# Patient Record
Sex: MALE | Race: OTHER | NOT HISPANIC OR LATINO | URBAN - METROPOLITAN AREA
[De-identification: names, ages, dates, MRNs, and addresses within clinical notes are randomized per-mention and may not be internally consistent; named-entity substitution may affect disease eponyms.]

---

## 2018-06-16 ENCOUNTER — INPATIENT (INPATIENT)
Facility: HOSPITAL | Age: 68
LOS: 3 days | Discharge: HOME | DRG: 189 | End: 2018-06-20
Attending: INTERNAL MEDICINE | Admitting: INTERNAL MEDICINE
Payer: MEDICAID

## 2018-06-16 VITALS
HEIGHT: 69 IN | OXYGEN SATURATION: 93 % | SYSTOLIC BLOOD PRESSURE: 161 MMHG | TEMPERATURE: 99 F | WEIGHT: 160.72 LBS | RESPIRATION RATE: 18 BRPM | DIASTOLIC BLOOD PRESSURE: 76 MMHG | HEART RATE: 107 BPM

## 2018-06-16 LAB
ALBUMIN SERPL ELPH-MCNC: 4.2 G/DL — SIGNIFICANT CHANGE UP (ref 3.3–5)
ALBUMIN SERPL ELPH-MCNC: 4.9 G/DL — SIGNIFICANT CHANGE UP (ref 3.3–5)
ALP SERPL-CCNC: 53 U/L — SIGNIFICANT CHANGE UP (ref 40–120)
ALP SERPL-CCNC: 63 U/L — SIGNIFICANT CHANGE UP (ref 40–120)
ALT FLD-CCNC: 13 U/L — SIGNIFICANT CHANGE UP (ref 10–45)
ALT FLD-CCNC: 15 U/L — SIGNIFICANT CHANGE UP (ref 10–45)
ANION GAP SERPL CALC-SCNC: 16 MMOL/L — SIGNIFICANT CHANGE UP (ref 5–17)
ANION GAP SERPL CALC-SCNC: 20 MMOL/L — HIGH (ref 5–17)
AST SERPL-CCNC: 16 U/L — SIGNIFICANT CHANGE UP (ref 10–40)
AST SERPL-CCNC: 16 U/L — SIGNIFICANT CHANGE UP (ref 10–40)
BASE EXCESS BLDV CALC-SCNC: -1.5 MMOL/L — SIGNIFICANT CHANGE UP (ref -2–2)
BASE EXCESS BLDV CALC-SCNC: -2.4 MMOL/L — LOW (ref -2–2)
BASOPHILS # BLD AUTO: 0 K/UL — SIGNIFICANT CHANGE UP (ref 0–0.2)
BASOPHILS NFR BLD AUTO: 0.5 % — SIGNIFICANT CHANGE UP (ref 0–2)
BILIRUB SERPL-MCNC: 0.3 MG/DL — SIGNIFICANT CHANGE UP (ref 0.2–1.2)
BILIRUB SERPL-MCNC: 0.6 MG/DL — SIGNIFICANT CHANGE UP (ref 0.2–1.2)
BUN SERPL-MCNC: 10 MG/DL — SIGNIFICANT CHANGE UP (ref 7–23)
BUN SERPL-MCNC: 16 MG/DL — SIGNIFICANT CHANGE UP (ref 7–23)
CA-I SERPL-SCNC: 1.18 MMOL/L — SIGNIFICANT CHANGE UP (ref 1.12–1.3)
CA-I SERPL-SCNC: 1.19 MMOL/L — SIGNIFICANT CHANGE UP (ref 1.12–1.3)
CALCIUM SERPL-MCNC: 9.1 MG/DL — SIGNIFICANT CHANGE UP (ref 8.4–10.5)
CALCIUM SERPL-MCNC: 9.5 MG/DL — SIGNIFICANT CHANGE UP (ref 8.4–10.5)
CHLORIDE BLDV-SCNC: 105 MMOL/L — SIGNIFICANT CHANGE UP (ref 96–108)
CHLORIDE BLDV-SCNC: 105 MMOL/L — SIGNIFICANT CHANGE UP (ref 96–108)
CHLORIDE SERPL-SCNC: 99 MMOL/L — SIGNIFICANT CHANGE UP (ref 96–108)
CHLORIDE SERPL-SCNC: 99 MMOL/L — SIGNIFICANT CHANGE UP (ref 96–108)
CO2 BLDV-SCNC: 23 MMOL/L — SIGNIFICANT CHANGE UP (ref 22–30)
CO2 BLDV-SCNC: 23 MMOL/L — SIGNIFICANT CHANGE UP (ref 22–30)
CO2 SERPL-SCNC: 21 MMOL/L — LOW (ref 22–31)
CO2 SERPL-SCNC: 25 MMOL/L — SIGNIFICANT CHANGE UP (ref 22–31)
CREAT SERPL-MCNC: 1.12 MG/DL — SIGNIFICANT CHANGE UP (ref 0.5–1.3)
CREAT SERPL-MCNC: 1.15 MG/DL — SIGNIFICANT CHANGE UP (ref 0.5–1.3)
EOSINOPHIL # BLD AUTO: 0.3 K/UL — SIGNIFICANT CHANGE UP (ref 0–0.5)
EOSINOPHIL NFR BLD AUTO: 3.4 % — SIGNIFICANT CHANGE UP (ref 0–6)
GAS PNL BLDV: 136 MMOL/L — SIGNIFICANT CHANGE UP (ref 136–145)
GAS PNL BLDV: 139 MMOL/L — SIGNIFICANT CHANGE UP (ref 136–145)
GAS PNL BLDV: SIGNIFICANT CHANGE UP
GLUCOSE BLDV-MCNC: 248 MG/DL — HIGH (ref 70–99)
GLUCOSE BLDV-MCNC: 315 MG/DL — HIGH (ref 70–99)
GLUCOSE SERPL-MCNC: 161 MG/DL — HIGH (ref 70–99)
GLUCOSE SERPL-MCNC: 249 MG/DL — HIGH (ref 70–99)
HCO3 BLDV-SCNC: 22 MMOL/L — SIGNIFICANT CHANGE UP (ref 21–29)
HCO3 BLDV-SCNC: 22 MMOL/L — SIGNIFICANT CHANGE UP (ref 21–29)
HCT VFR BLD CALC: 41.5 % — SIGNIFICANT CHANGE UP (ref 39–50)
HCT VFR BLDA CALC: 36 % — LOW (ref 39–50)
HCT VFR BLDA CALC: 38 % — LOW (ref 39–50)
HGB BLD CALC-MCNC: 11.7 G/DL — LOW (ref 13–17)
HGB BLD CALC-MCNC: 12.5 G/DL — LOW (ref 13–17)
HGB BLD-MCNC: 14.1 G/DL — SIGNIFICANT CHANGE UP (ref 13–17)
LACTATE BLDV-MCNC: 5.5 MMOL/L — CRITICAL HIGH (ref 0.7–2)
LACTATE BLDV-MCNC: 6 MMOL/L — CRITICAL HIGH (ref 0.7–2)
LYMPHOCYTES # BLD AUTO: 1.5 K/UL — SIGNIFICANT CHANGE UP (ref 1–3.3)
LYMPHOCYTES # BLD AUTO: 17.2 % — SIGNIFICANT CHANGE UP (ref 13–44)
MCHC RBC-ENTMCNC: 28.4 PG — SIGNIFICANT CHANGE UP (ref 27–34)
MCHC RBC-ENTMCNC: 33.9 GM/DL — SIGNIFICANT CHANGE UP (ref 32–36)
MCV RBC AUTO: 83.7 FL — SIGNIFICANT CHANGE UP (ref 80–100)
MONOCYTES # BLD AUTO: 0.7 K/UL — SIGNIFICANT CHANGE UP (ref 0–0.9)
MONOCYTES NFR BLD AUTO: 8.6 % — SIGNIFICANT CHANGE UP (ref 2–14)
NEUTROPHILS # BLD AUTO: 6 K/UL — SIGNIFICANT CHANGE UP (ref 1.8–7.4)
NEUTROPHILS NFR BLD AUTO: 70.3 % — SIGNIFICANT CHANGE UP (ref 43–77)
NT-PROBNP SERPL-SCNC: 78 PG/ML — SIGNIFICANT CHANGE UP (ref 0–300)
PCO2 BLDV: 36 MMHG — SIGNIFICANT CHANGE UP (ref 35–50)
PCO2 BLDV: 37 MMHG — SIGNIFICANT CHANGE UP (ref 35–50)
PH BLDV: 7.38 — SIGNIFICANT CHANGE UP (ref 7.35–7.45)
PH BLDV: 7.41 — SIGNIFICANT CHANGE UP (ref 7.35–7.45)
PLATELET # BLD AUTO: 201 K/UL — SIGNIFICANT CHANGE UP (ref 150–400)
PO2 BLDV: 49 MMHG — HIGH (ref 25–45)
PO2 BLDV: 51 MMHG — HIGH (ref 25–45)
POTASSIUM BLDV-SCNC: 3.8 MMOL/L — SIGNIFICANT CHANGE UP (ref 3.5–5.3)
POTASSIUM BLDV-SCNC: 4.1 MMOL/L — SIGNIFICANT CHANGE UP (ref 3.5–5.3)
POTASSIUM SERPL-MCNC: 3.8 MMOL/L — SIGNIFICANT CHANGE UP (ref 3.5–5.3)
POTASSIUM SERPL-MCNC: 4.5 MMOL/L — SIGNIFICANT CHANGE UP (ref 3.5–5.3)
POTASSIUM SERPL-SCNC: 3.8 MMOL/L — SIGNIFICANT CHANGE UP (ref 3.5–5.3)
POTASSIUM SERPL-SCNC: 4.5 MMOL/L — SIGNIFICANT CHANGE UP (ref 3.5–5.3)
PROT SERPL-MCNC: 7.2 G/DL — SIGNIFICANT CHANGE UP (ref 6–8.3)
PROT SERPL-MCNC: 8.4 G/DL — HIGH (ref 6–8.3)
RAPID RVP RESULT: DETECTED
RBC # BLD: 4.96 M/UL — SIGNIFICANT CHANGE UP (ref 4.2–5.8)
RBC # FLD: 12.8 % — SIGNIFICANT CHANGE UP (ref 10.3–14.5)
RV+EV RNA SPEC QL NAA+PROBE: DETECTED
SAO2 % BLDV: 85 % — SIGNIFICANT CHANGE UP (ref 67–88)
SAO2 % BLDV: 87 % — SIGNIFICANT CHANGE UP (ref 67–88)
SODIUM SERPL-SCNC: 140 MMOL/L — SIGNIFICANT CHANGE UP (ref 135–145)
SODIUM SERPL-SCNC: 140 MMOL/L — SIGNIFICANT CHANGE UP (ref 135–145)
WBC # BLD: 8.6 K/UL — SIGNIFICANT CHANGE UP (ref 3.8–10.5)
WBC # FLD AUTO: 8.6 K/UL — SIGNIFICANT CHANGE UP (ref 3.8–10.5)

## 2018-06-16 PROCEDURE — 93010 ELECTROCARDIOGRAM REPORT: CPT

## 2018-06-16 PROCEDURE — 71250 CT THORAX DX C-: CPT | Mod: 26

## 2018-06-16 PROCEDURE — 99218: CPT

## 2018-06-16 PROCEDURE — 71046 X-RAY EXAM CHEST 2 VIEWS: CPT | Mod: 26

## 2018-06-16 RX ORDER — DEXTROSE 50 % IN WATER 50 %
15 SYRINGE (ML) INTRAVENOUS ONCE
Qty: 0 | Refills: 0 | Status: DISCONTINUED | OUTPATIENT
Start: 2018-06-16 | End: 2018-06-20

## 2018-06-16 RX ORDER — MAGNESIUM SULFATE 500 MG/ML
2 VIAL (ML) INJECTION ONCE
Qty: 0 | Refills: 0 | Status: COMPLETED | OUTPATIENT
Start: 2018-06-16 | End: 2018-06-16

## 2018-06-16 RX ORDER — SODIUM CHLORIDE 9 MG/ML
1000 INJECTION, SOLUTION INTRAVENOUS
Qty: 0 | Refills: 0 | Status: DISCONTINUED | OUTPATIENT
Start: 2018-06-16 | End: 2018-06-20

## 2018-06-16 RX ORDER — INSULIN LISPRO 100/ML
VIAL (ML) SUBCUTANEOUS
Qty: 0 | Refills: 0 | Status: DISCONTINUED | OUTPATIENT
Start: 2018-06-16 | End: 2018-06-20

## 2018-06-16 RX ORDER — SODIUM CHLORIDE 9 MG/ML
500 INJECTION, SOLUTION INTRAVENOUS ONCE
Qty: 0 | Refills: 0 | Status: COMPLETED | OUTPATIENT
Start: 2018-06-16 | End: 2018-06-16

## 2018-06-16 RX ORDER — IPRATROPIUM/ALBUTEROL SULFATE 18-103MCG
3 AEROSOL WITH ADAPTER (GRAM) INHALATION ONCE
Qty: 0 | Refills: 0 | Status: COMPLETED | OUTPATIENT
Start: 2018-06-16 | End: 2018-06-16

## 2018-06-16 RX ORDER — SODIUM CHLORIDE 9 MG/ML
1000 INJECTION, SOLUTION INTRAVENOUS ONCE
Qty: 0 | Refills: 0 | Status: COMPLETED | OUTPATIENT
Start: 2018-06-16 | End: 2018-06-16

## 2018-06-16 RX ORDER — DEXTROSE 50 % IN WATER 50 %
25 SYRINGE (ML) INTRAVENOUS ONCE
Qty: 0 | Refills: 0 | Status: DISCONTINUED | OUTPATIENT
Start: 2018-06-16 | End: 2018-06-20

## 2018-06-16 RX ORDER — ALBUTEROL 90 UG/1
2.5 AEROSOL, METERED ORAL EVERY 4 HOURS
Qty: 0 | Refills: 0 | Status: DISCONTINUED | OUTPATIENT
Start: 2018-06-16 | End: 2018-06-18

## 2018-06-16 RX ORDER — DEXTROSE 50 % IN WATER 50 %
12.5 SYRINGE (ML) INTRAVENOUS ONCE
Qty: 0 | Refills: 0 | Status: DISCONTINUED | OUTPATIENT
Start: 2018-06-16 | End: 2018-06-20

## 2018-06-16 RX ORDER — GLUCAGON INJECTION, SOLUTION 0.5 MG/.1ML
1 INJECTION, SOLUTION SUBCUTANEOUS ONCE
Qty: 0 | Refills: 0 | Status: DISCONTINUED | OUTPATIENT
Start: 2018-06-16 | End: 2018-06-20

## 2018-06-16 RX ORDER — ALBUTEROL 90 UG/1
2.5 AEROSOL, METERED ORAL ONCE
Qty: 0 | Refills: 0 | Status: COMPLETED | OUTPATIENT
Start: 2018-06-16 | End: 2018-06-16

## 2018-06-16 RX ORDER — INSULIN LISPRO 100/ML
VIAL (ML) SUBCUTANEOUS AT BEDTIME
Qty: 0 | Refills: 0 | Status: DISCONTINUED | OUTPATIENT
Start: 2018-06-16 | End: 2018-06-20

## 2018-06-16 RX ADMIN — Medication 50 GRAM(S): at 13:45

## 2018-06-16 RX ADMIN — Medication 25 MILLIGRAM(S): at 18:01

## 2018-06-16 RX ADMIN — SODIUM CHLORIDE 500 MILLILITER(S): 9 INJECTION, SOLUTION INTRAVENOUS at 18:41

## 2018-06-16 RX ADMIN — SODIUM CHLORIDE 1000 MILLILITER(S): 9 INJECTION, SOLUTION INTRAVENOUS at 21:00

## 2018-06-16 RX ADMIN — Medication 40 MILLIGRAM(S): at 18:54

## 2018-06-16 RX ADMIN — ALBUTEROL 2.5 MILLIGRAM(S): 90 AEROSOL, METERED ORAL at 17:15

## 2018-06-16 RX ADMIN — Medication 60 MILLIGRAM(S): at 11:13

## 2018-06-16 RX ADMIN — Medication 1200 MILLIGRAM(S): at 19:49

## 2018-06-16 RX ADMIN — ALBUTEROL 2.5 MILLIGRAM(S): 90 AEROSOL, METERED ORAL at 21:29

## 2018-06-16 RX ADMIN — ALBUTEROL 2.5 MILLIGRAM(S): 90 AEROSOL, METERED ORAL at 13:46

## 2018-06-16 RX ADMIN — ALBUTEROL 2.5 MILLIGRAM(S): 90 AEROSOL, METERED ORAL at 16:13

## 2018-06-16 RX ADMIN — Medication 3 MILLILITER(S): at 11:14

## 2018-06-16 RX ADMIN — ALBUTEROL 2.5 MILLIGRAM(S): 90 AEROSOL, METERED ORAL at 17:45

## 2018-06-16 RX ADMIN — Medication 3 MILLILITER(S): at 11:13

## 2018-06-16 RX ADMIN — SODIUM CHLORIDE 4000 MILLILITER(S): 9 INJECTION, SOLUTION INTRAVENOUS at 11:13

## 2018-06-16 NOTE — ED PROVIDER NOTE - CARE PLAN
Principal Discharge DX:	Acute respiratory failure with hypoxia  Secondary Diagnosis:	Acute bronchospasm

## 2018-06-16 NOTE — ED PROVIDER NOTE - OBJECTIVE STATEMENT
69 yo male with hx of HTN DM bronchitis presenting with 2 day hx of wheezing nonproductive cough and shortness of breath.  worse with exertion, temporarily improved with nebulizer.  no recent travel no sick contacts.  endorses runny nose.  no hx of asthma.  endorses low grade fevers.    pcp- none

## 2018-06-16 NOTE — ED CDU PROVIDER INITIAL DAY NOTE - ATTENDING CONTRIBUTION TO CARE
Attending MD Louis:   I personally have seen and examined this patient.  Physician assistant note reviewed and agree on plan of care and except where noted.  See HPI, PE, and MDM for details.

## 2018-06-16 NOTE — ED CDU PROVIDER INITIAL DAY NOTE - PROGRESS NOTE DETAILS
patient still feeling improved but with diffuse wheezing, patient getting additional duonebs, will noncon CT chest for concern of PNA  and continue to monitor - Simi Leonard PA-C patient returned from ct scan, lactate elevated will repeat testing following bolus. patient reports significant improvement, hislung sounds are still with diffuse wheezing however much improved. patient is comfortable appearing and just received second dose of solumedrol. will continue to monitor - Simi Leonard PA-C MARTHA Lyon: Evaluated patient at bedside who continues to have coughing fits. VSS patient on 2L. Patient no acute signs of distress and no use of retractions/accessory muscles. Patient has diffuse inspiratory/expiratory wheezing. Patient pending CT chest results MARTHA Lyon: Patient seen at bedside in NAD.  VSS.  Patient resting comfortably without complaints. Lactate 6.0. Discussed case with Dr. Vishnu Barrios who recommended to keep patient on LR, hold metformin, and add on troponin to r/o myocarditis MARTHA Lyon: Patient seen at bedside in NAD.  VSS.  Patient resting comfortably without complaints. Lactate 6.0. Discussed case with Dr. Vishnu Barrios who recommended to keep patient on LR, hold metformin, repeat VBG in AM, and add on troponin to r/o myocarditis

## 2018-06-16 NOTE — ED CDU PROVIDER INITIAL DAY NOTE - OBJECTIVE STATEMENT
69 yo male with hx of HTN DM bronchitis presenting with 2 day hx of wheezing nonproductive cough and shortness of breath.  worse with exertion, temporarily improved with nebulizer.  no recent travel no sick contacts.  endorses runny nose.  no hx of asthma.  endorses subjective fever. patient reports he was put on an inhaler PRN by pulmonologist in [place] because this tends to happen to him at change of season. he states he hasn't required it in many weeks. no known hx of COPD. nonsmoker    pcp- none

## 2018-06-16 NOTE — ED ADULT NURSE REASSESSMENT NOTE - GENERAL PATIENT STATE
comfortable appearance/family/SO at bedside/smiling/interactive/cooperative/improvement verbalized/resting/sleeping

## 2018-06-16 NOTE — ED PROVIDER NOTE - MEDICAL DECISION MAKING DETAILS
Attending MD Louis: 68M with DM, HTN presenting with cough and wheezing x 2-3 days, arrives with diffuse wheezes on exam, borderline hypoxia with O2 sat in 90s, acute reactive airways disease presumably from bronchitis, no ho asthma/COPD. Plan for RVP, CXR, bronchodilators, IV steroids and reassessment.

## 2018-06-16 NOTE — ED CDU PROVIDER DISPOSITION NOTE - CLINICAL COURSE
69 yo male with hx of HTN DM bronchitis presenting with 2 day hx of wheezing nonproductive cough and shortness of breath.  worse with exertion, temporarily improved with nebulizer.  no recent travel no sick contacts.  endorses runny nose.  no hx of asthma.  endorses subjective fever. patient reports he was put on an inhaler PRN by pulmonologist in [place] because this tends to happen to him at change of season. he states he hasn't required it in many weeks. no known hx of COPD. nonsmoker    ED course: Labs unremarkable except RVP + enterovirus. Give duonebsx3 and Magnesium 2mg IV with solumedrol. Patient transferred to CDU for further monitoring PRN nebs and IV steroids q8. Patient continued to have inspiratory/expiratory wheeze with hypoxia on room air 90%. Given additional duonebsx3 with some improvement of symptoms. VBG performed which showed lactate 5.5. CT chest performed given concern for pneumonia which resulted in ______. Patient improved while in CDU and was stable for discharge per ED attending 69 yo male with hx of HTN DM bronchitis presenting with 2 day hx of wheezing nonproductive cough and shortness of breath.  worse with exertion, temporarily improved with nebulizer.  no recent travel no sick contacts.  endorses runny nose.  no hx of asthma.  endorses subjective fever. patient reports he was put on an inhaler PRN by pulmonologist in [place] because this tends to happen to him at change of season. he states he hasn't required it in many weeks. no known hx of COPD. nonsmoker    ED course: Labs unremarkable except RVP + enterovirus. Give duonebsx3 and Magnesium 2mg IV with solumedrol. Patient transferred to CDU for further monitoring PRN nebs and IV steroids q8. Patient continued to have inspiratory/expiratory wheeze with hypoxia on room air 90%. Given additional duonebsx3 with some improvement of symptoms. VBG performed which showed lactate 5.5. CT chest performed given concern for pneumonia which resulted in streaky bibasilar atelectasis. In AM reeval patient still requiring o2 by nasal cannula with peak flow of 150 and persistent wheezing following 3 duonebs, patient admitted for further treatment. - Simi Leonard PA-C

## 2018-06-16 NOTE — ED ADULT NURSE REASSESSMENT NOTE - NS ED NURSE REASSESS COMMENT FT1
16.30  received the pt from [facility] from [facility] for the management of SOB. Pt is wheezing  and  coughing Pt not in acute respiratory Distress . SPO2 96 % On 2 L NC . Pt is medicated for wheezing  Monitored closely respiratory distress Continue to monitor
patient resting comfortably in bed in no acute distress. patient denies pain at  this time O2=90%RA. possible CDU admission after Magnesium infusion as ordered.
Received pt from AK[facility]IL Moreira, received pt alert and responsive, oriented x4, transferred to cdu due to persistent Inspiratory/ expiratory wheezing accompanied by persistent productive, barking cough. Pt received with 2L o2 va NC, pt tolerating well. ordered Mucinex given to pt along with 500cc bolus LR, blood to be drawn once fluids completed. Pt stated " I am feeling better" IV in place, patent and free of signs of infiltration, pt denies chest pain or palpitations, V/S stable, pt afebrile, pt denies pain at this time. Pt educated on unit and unit rules, instructed patient to notify RN of any needed assistance, Pt verbalizes understanding, Call bell placed within reach. Safety maintained. Will continue to monitor. Continuous o2 monitoring in progress currently 97% on 2L o2 via NC.

## 2018-06-16 NOTE — ED PROVIDER NOTE - PHYSICAL EXAMINATION
gen: well appearing  Mentation: AAO x 3  psych: mood appropriate  ENT: airway patent, no exudates appreciated  Eyes: conjunctivae clear bilaterally  Cardio: RRR, no m/r/g  Resp: bilateral wheezing and rhonchi heard diffusely  GI: s/nt/nd   Neuro: AAO x 3, sensation and motor function intact, CN 2-12 intact  Skin: No evidence of rash  MSK: normal movement of all extremities

## 2018-06-16 NOTE — ED PROVIDER NOTE - NS ED ROS FT
Constitutional: + fever  Eyes: no conjunctivitis  Ears: no ear pain   Nose: + nasal congestion, Mouth/Throat: no throat pain, Neck: no stiffness  Cardiovascular: no chest pain  Chest: + cough  Gastrointestinal: no abdominal pain, no vomiting and diarrhea  MSK: no joint pain  : no dysuria  Skin: no rash  Neuro: no LOC

## 2018-06-16 NOTE — ED PROVIDER NOTE - ATTENDING CONTRIBUTION TO CARE
Attending MD Louis:  I personally have seen and examined this patient.  Resident note reviewed and agree on plan of care and except where noted.  See HPI, PE, and MDM for details.

## 2018-06-16 NOTE — ED PROVIDER NOTE - PROGRESS NOTE DETAILS
patient feels better after 3 treatments, on exam, wheezing improved but rhonchi heard bilaterally -tristin vital capacity 300 after 3 nebs

## 2018-06-16 NOTE — ED CDU PROVIDER DISPOSITION NOTE - PLAN OF CARE
(1) You will need to follow-up with your PMD in 2-3 days for your asthma exacerbation. A copy of your results were given to you to bring to your appointment.  (2) Drink PLENTY of fluids to stay hydrated.  (3) Continue your home medications along with Prednisone taper for next few days AND Tessalon Perles 100mg by mouth three times/day.  (4) Return to ER for worsening shortness of breath, fevers, or any other concerns.

## 2018-06-17 DIAGNOSIS — J96.01 ACUTE RESPIRATORY FAILURE WITH HYPOXIA: ICD-10-CM

## 2018-06-17 DIAGNOSIS — J45.901 UNSPECIFIED ASTHMA WITH (ACUTE) EXACERBATION: ICD-10-CM

## 2018-06-17 DIAGNOSIS — E11.9 TYPE 2 DIABETES MELLITUS WITHOUT COMPLICATIONS: ICD-10-CM

## 2018-06-17 DIAGNOSIS — E78.5 HYPERLIPIDEMIA, UNSPECIFIED: ICD-10-CM

## 2018-06-17 DIAGNOSIS — I10 ESSENTIAL (PRIMARY) HYPERTENSION: ICD-10-CM

## 2018-06-17 LAB
GAS PNL BLDV: SIGNIFICANT CHANGE UP
GLUCOSE BLDC GLUCOMTR-MCNC: 305 MG/DL — HIGH (ref 70–99)
GLUCOSE BLDC GLUCOMTR-MCNC: 306 MG/DL — HIGH (ref 70–99)
GLUCOSE BLDC GLUCOMTR-MCNC: 313 MG/DL — HIGH (ref 70–99)
HBA1C BLD-MCNC: 6.2 % — HIGH (ref 4–5.6)
TROPONIN T, HIGH SENSITIVITY RESULT: 16 NG/L — SIGNIFICANT CHANGE UP (ref 0–51)

## 2018-06-17 PROCEDURE — 99217: CPT

## 2018-06-17 RX ORDER — IPRATROPIUM/ALBUTEROL SULFATE 18-103MCG
3 AEROSOL WITH ADAPTER (GRAM) INHALATION ONCE
Qty: 0 | Refills: 0 | Status: COMPLETED | OUTPATIENT
Start: 2018-06-17 | End: 2018-06-17

## 2018-06-17 RX ORDER — METFORMIN HYDROCHLORIDE 850 MG/1
1000 TABLET ORAL
Qty: 0 | Refills: 0 | Status: DISCONTINUED | OUTPATIENT
Start: 2018-06-17 | End: 2018-06-18

## 2018-06-17 RX ADMIN — ALBUTEROL 2.5 MILLIGRAM(S): 90 AEROSOL, METERED ORAL at 16:14

## 2018-06-17 RX ADMIN — Medication 100 MILLIGRAM(S): at 16:14

## 2018-06-17 RX ADMIN — Medication 2: at 21:43

## 2018-06-17 RX ADMIN — Medication 40 MILLIGRAM(S): at 21:43

## 2018-06-17 RX ADMIN — Medication 1200 MILLIGRAM(S): at 17:10

## 2018-06-17 RX ADMIN — METFORMIN HYDROCHLORIDE 1000 MILLIGRAM(S): 850 TABLET ORAL at 21:43

## 2018-06-17 RX ADMIN — Medication 100 MILLIGRAM(S): at 02:40

## 2018-06-17 RX ADMIN — ALBUTEROL 2.5 MILLIGRAM(S): 90 AEROSOL, METERED ORAL at 06:44

## 2018-06-17 RX ADMIN — Medication 4: at 17:09

## 2018-06-17 RX ADMIN — Medication 4: at 13:55

## 2018-06-17 RX ADMIN — Medication 40 MILLIGRAM(S): at 03:14

## 2018-06-17 RX ADMIN — Medication 1200 MILLIGRAM(S): at 11:05

## 2018-06-17 RX ADMIN — Medication 3 MILLILITER(S): at 07:36

## 2018-06-17 RX ADMIN — Medication 2: at 09:28

## 2018-06-17 RX ADMIN — Medication 40 MILLIGRAM(S): at 13:56

## 2018-06-17 NOTE — H&P ADULT - NSHPLABSRESULTS_GEN_ALL_CORE
14.1   8.6   )-----------( 201      ( 16 Jun 2018 11:16 )             41.5     06-16    140  |  99  |  16  ----------------------------<  249<H>  4.5   |  21<L>  |  1.15    Ca    9.1      16 Jun 2018 22:23    TPro  7.2  /  Alb  4.2  /  TBili  0.3  /  DBili  x   /  AST  16  /  ALT  13  /  AlkPhos  53  06-16

## 2018-06-17 NOTE — ED CDU PROVIDER SUBSEQUENT DAY NOTE - HISTORY
MARTHA Lyon: Patient seen at bedside in NAD.  VSS.  Patient resting comfortably without complaints. No events noted over tele

## 2018-06-17 NOTE — ED CDU PROVIDER SUBSEQUENT DAY NOTE - PROGRESS NOTE DETAILS
MARTHA Lyon: Patient sleeping no events noted over tele MARTHA Lyon: Patient sleeping no events noted over tele. However it was noted overnight patient would desat to 88% on 2 L and immediately improve to 95%. Patient noted to be occasionally coughing therefore provided tessalon pearls with improvement of symptoms MARTHA Lyon: Patient coughing with diffuse wheezing. Will provide tessalon pearls and reassess. Patient refused nebulizer treatment at this time MARTHA Lyon: Patient continues to have productive cough and audible wheezing. Patient has diffuse inspiratory/expiratory wheezing bilaterally. Continues to be on 98% SpO2 while on 2L nasal cannula. Will give patient albuterol neb. Also provided patient spirometer and instructed patient how to use it and educated benefits of spirometer as patient has atelectasis seen on CT chest. Patient used spirometer correctly and instructed patient to use it 5 times a hour. Peak flow 175 patient received neb and is persistently wheezing, states feels improved but peak flow is  <200. patient has remained in CDU for 20 hours with improvement however not well enough for discharge at this time. will plan for admission to the hospital. pmd is in [place].  will discuss plan with Dr. Danica Leonard PA-C

## 2018-06-17 NOTE — H&P ADULT - NSHPPHYSICALEXAM_GEN_ALL_CORE
Vital Signs Last 24 Hrs  T(C): 36.6 (17 Jun 2018 12:18), Max: 36.9 (16 Jun 2018 19:30)  T(F): 97.8 (17 Jun 2018 12:18), Max: 98.5 (16 Jun 2018 19:30)  HR: 100 (17 Jun 2018 12:18) (95 - 111)  BP: 122/56 (17 Jun 2018 12:18) (115/70 - 150/56)  BP(mean): --  RR: 17 (17 Jun 2018 12:18) (17 - 22)  SpO2: 96% (17 Jun 2018 12:18) (96% - 98%)

## 2018-06-18 LAB
GLUCOSE BLDC GLUCOMTR-MCNC: 144 MG/DL — HIGH (ref 70–99)
GLUCOSE BLDC GLUCOMTR-MCNC: 182 MG/DL — HIGH (ref 70–99)
GLUCOSE BLDC GLUCOMTR-MCNC: 216 MG/DL — HIGH (ref 70–99)
GLUCOSE BLDC GLUCOMTR-MCNC: 235 MG/DL — HIGH (ref 70–99)
GLUCOSE BLDC GLUCOMTR-MCNC: 242 MG/DL — HIGH (ref 70–99)
GLUCOSE BLDC GLUCOMTR-MCNC: 291 MG/DL — HIGH (ref 70–99)

## 2018-06-18 RX ORDER — IPRATROPIUM/ALBUTEROL SULFATE 18-103MCG
3 AEROSOL WITH ADAPTER (GRAM) INHALATION EVERY 6 HOURS
Qty: 0 | Refills: 0 | Status: DISCONTINUED | OUTPATIENT
Start: 2018-06-18 | End: 2018-06-20

## 2018-06-18 RX ORDER — MONTELUKAST 4 MG/1
10 TABLET, CHEWABLE ORAL AT BEDTIME
Qty: 0 | Refills: 0 | Status: DISCONTINUED | OUTPATIENT
Start: 2018-06-18 | End: 2018-06-20

## 2018-06-18 RX ORDER — BUDESONIDE AND FORMOTEROL FUMARATE DIHYDRATE 160; 4.5 UG/1; UG/1
2 AEROSOL RESPIRATORY (INHALATION)
Qty: 0 | Refills: 0 | Status: DISCONTINUED | OUTPATIENT
Start: 2018-06-18 | End: 2018-06-20

## 2018-06-18 RX ADMIN — Medication 1200 MILLIGRAM(S): at 18:53

## 2018-06-18 RX ADMIN — Medication 20 MILLIGRAM(S): at 19:02

## 2018-06-18 RX ADMIN — METFORMIN HYDROCHLORIDE 1000 MILLIGRAM(S): 850 TABLET ORAL at 05:36

## 2018-06-18 RX ADMIN — ALBUTEROL 2.5 MILLIGRAM(S): 90 AEROSOL, METERED ORAL at 08:30

## 2018-06-18 RX ADMIN — Medication 2: at 12:44

## 2018-06-18 RX ADMIN — Medication 20 MILLIGRAM(S): at 12:44

## 2018-06-18 RX ADMIN — MONTELUKAST 10 MILLIGRAM(S): 4 TABLET, CHEWABLE ORAL at 22:18

## 2018-06-18 RX ADMIN — Medication 2: at 16:59

## 2018-06-18 RX ADMIN — Medication 3 MILLILITER(S): at 18:53

## 2018-06-18 RX ADMIN — Medication 1: at 22:18

## 2018-06-18 RX ADMIN — Medication 100 MILLIGRAM(S): at 08:31

## 2018-06-18 RX ADMIN — BUDESONIDE AND FORMOTEROL FUMARATE DIHYDRATE 2 PUFF(S): 160; 4.5 AEROSOL RESPIRATORY (INHALATION) at 19:02

## 2018-06-18 RX ADMIN — Medication 40 MILLIGRAM(S): at 05:36

## 2018-06-18 RX ADMIN — Medication 3 MILLILITER(S): at 12:59

## 2018-06-18 RX ADMIN — Medication 1200 MILLIGRAM(S): at 05:36

## 2018-06-18 NOTE — CONSULT NOTE ADULT - ASSESSMENT
67 yo male with hx of HTN DM bronchitis presenting with 2 day hx of wheezing nonproductive cough and shortness of breath.  worse with exertion, temporarily improved with nebulizer.  no recent travel no sick contacts.  endorses runny nose.  no hx of asthma.  endorses low grade fevers.   ot says he was diagnosed with bronchitis many years ago and he was  using inhalers but stopped few months ago as he was having no problems: He was also told one time that he needs to take steroids with tapering dosages if his asthma got worse: Currently he has been wheezing alot and for past two nights at home, he woke up early in AM due to wheezing:

## 2018-06-18 NOTE — CONSULT NOTE ADULT - PROBLEM SELECTOR RECOMMENDATION 9
has asthma exacerbation: decrease steroids to 20 mg q 6 hours, add Singulair as well as Symbicort and cont nebs q 6 hours ATC: CT scan c hest noted: only bibasilar streaky atelectasis: no antibiotics necessary!

## 2018-06-18 NOTE — CONSULT NOTE ADULT - SUBJECTIVE AND OBJECTIVE BOX
Patient is a 68y old  Male who presents with a chief complaint of SOB, wheezing (17 Jun 2018 15:47)      HPI:  67 yo male with hx of HTN DM bronchitis presenting with 2 day hx of wheezing nonproductive cough and shortness of breath.  worse with exertion, temporarily improved with nebulizer.  no recent travel no sick contacts.  endorses runny nose.  no hx of asthma.  endorses low grade fevers.      ?FOLLOWING PRESENT  [ ] Hx of PE/DVT, [ ] Hx COPD, [y ] Hx of Asthma, [ ] Hx of Hospitalization, [ ]  Hx of BiPAP/CPAP use, [ ] Hx of MORIAH    Allergies    No Known Allergies    Intolerances        PAST MEDICAL & SURGICAL HISTORY:  HLD (hyperlipidemia)  DM (diabetes mellitus)  HTN (hypertension)      FAMILY HISTORY:      Social History: [  ] TOBACCO                  [  ] ETOH                                 [  ] IVDA/DRUGS    REVIEW OF SYSTEMS      General:	    Skin/Breast:  	  Ophthalmologic:  	  ENMT:	    Respiratory and Thorax:  	  Cardiovascular:	    Gastrointestinal:	    Genitourinary:	    Musculoskeletal:	    Neurological:	    Psychiatric:	    Hematology/Lymphatics:	    Endocrine:	    Allergic/Immunologic:	    MEDICATIONS  (STANDING):  dextrose 5%. 1000 milliLiter(s) (50 mL/Hr) IV Continuous <Continuous>  dextrose 50% Injectable 12.5 Gram(s) IV Push once  dextrose 50% Injectable 25 Gram(s) IV Push once  dextrose 50% Injectable 25 Gram(s) IV Push once  guaiFENesin ER 1200 milliGRAM(s) Oral every 12 hours  insulin lispro (HumaLOG) corrective regimen sliding scale   SubCutaneous three times a day before meals  insulin lispro (HumaLOG) corrective regimen sliding scale   SubCutaneous at bedtime  methylPREDNISolone sodium succinate Injectable 40 milliGRAM(s) IV Push every 8 hours    MEDICATIONS  (PRN):  ALBUTerol    0.083% 2.5 milliGRAM(s) Nebulizer every 4 hours PRN Wheezing  benzonatate 100 milliGRAM(s) Oral every 8 hours PRN Cough  dextrose 40% Gel 15 Gram(s) Oral once PRN Blood Glucose LESS THAN 70 milliGRAM(s)/deciliter  glucagon  Injectable 1 milliGRAM(s) IntraMuscular once PRN Glucose LESS THAN 70 milligrams/deciliter       Vital Signs Last 24 Hrs  T(C): 36.7 (18 Jun 2018 05:26), Max: 36.7 (17 Jun 2018 12:18)  T(F): 98 (18 Jun 2018 05:26), Max: 98 (17 Jun 2018 12:18)  HR: 97 (18 Jun 2018 05:26) (96 - 108)  BP: 127/76 (18 Jun 2018 05:26) (122/56 - 150/56)  BP(mean): --  RR: 20 (18 Jun 2018 05:26) (17 - 20)  SpO2: 92% (18 Jun 2018 05:26) (92% - 96%)        I&O's Summary    17 Jun 2018 07:01  -  18 Jun 2018 07:00  --------------------------------------------------------  IN: 840 mL / OUT: 4 mL / NET: 836 mL        Physical Exam:   GENERAL: NAD, well-groomed, well-developed  HEENT: SEAN/   Atraumatic, Normocephalic  ENMT: No tonsillar erythema, exudates, or enlargement; Moist mucous membranes, Good dentition, No lesions  NECK: Supple, No JVD, Normal thyroid  CHEST/LUNG: Clear to auscultation bilaterally; No rales, rhonchi, wheezing, or rubs  CVS: Regular rate and rhythm; No murmurs, rubs, or gallops  GI: : Soft, Nontender, Nondistended; Bowel sounds present  NERVOUS SYSTEM:  Alert & Oriented X3, Good concentration; Motor Strength 5/5 B/L upper and lower extremities; DTRs 2+ intact and symmetric  EXTREMITIES:  2+ Peripheral Pulses, No clubbing, cyanosis, or edema  LYMPH: No lymphadenopathy noted  SKIN: No rashes or lesions  ENDOCRINOLOGY: No Thyromegaly  PSYCH: Appropriate    Labs:  UNK<48<4>>20<<7.355>>Venous<<3><<4><<5<<209>>, Venous<36<4>>49<<7.415>>Venous<<3><<4><<5<<499>>, Venous<37<4>>51<<7.385>>Venous<<3><<4><<5<<519>>                            14.1   8.6   )-----------( 201      ( 16 Jun 2018 11:16 )             41.5     06-16    140  |  99  |  16  ----------------------------<  249<H>  4.5   |  21<L>  |  1.15  06-16    140  |  99  |  10  ----------------------------<  161<H>  3.8   |  25  |  1.12    Ca    9.1      16 Jun 2018 22:23  Ca    9.5      16 Jun 2018 11:16    TPro  7.2  /  Alb  4.2  /  TBili  0.3  /  DBili  x   /  AST  16  /  ALT  13  /  AlkPhos  53  06-16  TPro  8.4<H>  /  Alb  4.9  /  TBili  0.6  /  DBili  x   /  AST  16  /  ALT  15  /  AlkPhos  63  06-16    CAPILLARY BLOOD GLUCOSE      POCT Blood Glucose.: 144 mg/dL (18 Jun 2018 08:00)  POCT Blood Glucose.: 182 mg/dL (18 Jun 2018 06:04)  POCT Blood Glucose.: 306 mg/dL (17 Jun 2018 21:40)  POCT Blood Glucose.: 305 mg/dL (17 Jun 2018 16:35)  POCT Blood Glucose.: 313 mg/dL (17 Jun 2018 13:49)    LIVER FUNCTIONS - ( 16 Jun 2018 22:23 )  Alb: 4.2 g/dL / Pro: 7.2 g/dL / ALK PHOS: 53 U/L / ALT: 13 U/L / AST: 16 U/L / GGT: x               D DImer  Serum Pro-Brain Natriuretic Peptide: 78 pg/mL (06-16 @ 11:16)      Studies  Chest X-RAY  CT SCAN Chest   CT Abdomen  Venous Dopplers: LE:   Others Patient is a 68y old  Male who presents with a chief complaint of SOB, wheezing (17 Jun 2018 15:47)      HPI:  67 yo male with hx of HTN DM bronchitis presenting with 2 day hx of wheezing nonproductive cough and shortness of breath.  worse with exertion, temporarily improved with nebulizer.  no recent travel no sick contacts.  endorses runny nose.  no hx of asthma.  endorses low grade fevers.   ot says he was diagnosed with bronchitis many years ago and he was  using inhalers but stopped few months ago as he was having no problems: He was also told one time that he needs to take steroids with tapering dosages if his asthma got worse: Currently he has been wheezing alot and for past two nights at home, he woke up early in AM due to wheezing:     ?FOLLOWING PRESENT  [ ] Hx of PE/DVT, [ ] Hx COPD, [y ] Hx of Asthma, [ ] Hx of Hospitalization, [ ]  Hx of BiPAP/CPAP use, [ ] Hx of MORIAH    Allergies    No Known Allergies    Intolerances        PAST MEDICAL & SURGICAL HISTORY:  HLD (hyperlipidemia)  DM (diabetes mellitus)  HTN (hypertension)  Asthma      FAMILY HISTORY:      Social History: [ x ] TOBACCO                  [ x ] ETOH                                 [  x] IVDA/DRUGS    REVIEW OF SYSTEMS      General:	x    Skin/Breast:x  	  Ophthalmologic:x  	  ENMT:	x    Respiratory and Thorax: sob, wheezing, cough   	  Cardiovascular:	x    Gastrointestinal:	x    Genitourinary:	x    Musculoskeletal:	x    Neurological:	x    Psychiatric:	x    Hematology/Lymphatics:	x    Endocrine:	x    Allergic/Immunologic:	x    MEDICATIONS  (STANDING):  dextrose 5%. 1000 milliLiter(s) (50 mL/Hr) IV Continuous <Continuous>  dextrose 50% Injectable 12.5 Gram(s) IV Push once  dextrose 50% Injectable 25 Gram(s) IV Push once  dextrose 50% Injectable 25 Gram(s) IV Push once  guaiFENesin ER 1200 milliGRAM(s) Oral every 12 hours  insulin lispro (HumaLOG) corrective regimen sliding scale   SubCutaneous three times a day before meals  insulin lispro (HumaLOG) corrective regimen sliding scale   SubCutaneous at bedtime  methylPREDNISolone sodium succinate Injectable 40 milliGRAM(s) IV Push every 8 hours    MEDICATIONS  (PRN):  ALBUTerol    0.083% 2.5 milliGRAM(s) Nebulizer every 4 hours PRN Wheezing  benzonatate 100 milliGRAM(s) Oral every 8 hours PRN Cough  dextrose 40% Gel 15 Gram(s) Oral once PRN Blood Glucose LESS THAN 70 milliGRAM(s)/deciliter  glucagon  Injectable 1 milliGRAM(s) IntraMuscular once PRN Glucose LESS THAN 70 milligrams/deciliter       Vital Signs Last 24 Hrs  T(C): 36.7 (18 Jun 2018 05:26), Max: 36.7 (17 Jun 2018 12:18)  T(F): 98 (18 Jun 2018 05:26), Max: 98 (17 Jun 2018 12:18)  HR: 97 (18 Jun 2018 05:26) (96 - 108)  BP: 127/76 (18 Jun 2018 05:26) (122/56 - 150/56)  BP(mean): --  RR: 20 (18 Jun 2018 05:26) (17 - 20)  SpO2: 92% (18 Jun 2018 05:26) (92% - 96%)        I&O's Summary    17 Jun 2018 07:01  -  18 Jun 2018 07:00  --------------------------------------------------------  IN: 840 mL / OUT: 4 mL / NET: 836 mL        Physical Exam:   GENERAL: NAD, well-groomed, well-developed  HEENT: SEAN/   Atraumatic, Normocephalic  ENMT: No tonsillar erythema, exudates, or enlargement; Moist mucous membranes, Good dentition, No lesions  NECK: Supple, No JVD, Normal thyroid  CHEST/LUNG: extesive wheezing +++  CVS: Regular rate and rhythm; No murmurs, rubs, or gallops  GI: : Soft, Nontender, Nondistended; Bowel sounds present  NERVOUS SYSTEM:  Alert & Oriented X3  EXTREMITIES:  2+ Peripheral Pulses, No clubbing, cyanosis, or edema  LYMPH: No lymphadenopathy noted  SKIN: No rashes or lesions  ENDOCRINOLOGY: No Thyromegaly  PSYCH: Appropriate    Labs:  UNK<48<4>>20<<7.355>>Venous<<3><<4><<5<<209>>, Venous<36<4>>49<<7.415>>Venous<<3><<4><<5<<499>>, Venous<37<4>>51<<7.385>>Venous<<3><<4><<5<<519>>                            14.1   8.6   )-----------( 201      ( 16 Jun 2018 11:16 )             41.5     06-16    140  |  99  |  16  ----------------------------<  249<H>  4.5   |  21<L>  |  1.15  06-16    140  |  99  |  10  ----------------------------<  161<H>  3.8   |  25  |  1.12    Ca    9.1      16 Jun 2018 22:23  Ca    9.5      16 Jun 2018 11:16    TPro  7.2  /  Alb  4.2  /  TBili  0.3  /  DBili  x   /  AST  16  /  ALT  13  /  AlkPhos  53  06-16  TPro  8.4<H>  /  Alb  4.9  /  TBili  0.6  /  DBili  x   /  AST  16  /  ALT  15  /  AlkPhos  63  06-16    CAPILLARY BLOOD GLUCOSE      POCT Blood Glucose.: 144 mg/dL (18 Jun 2018 08:00)  POCT Blood Glucose.: 182 mg/dL (18 Jun 2018 06:04)  POCT Blood Glucose.: 306 mg/dL (17 Jun 2018 21:40)  POCT Blood Glucose.: 305 mg/dL (17 Jun 2018 16:35)  POCT Blood Glucose.: 313 mg/dL (17 Jun 2018 13:49)    LIVER FUNCTIONS - ( 16 Jun 2018 22:23 )  Alb: 4.2 g/dL / Pro: 7.2 g/dL / ALK PHOS: 53 U/L / ALT: 13 U/L / AST: 16 U/L / GGT: x               D DImer  Serum Pro-Brain Natriuretic Peptide: 78 pg/mL (06-16 @ 11:16)      Studies  Chest X-RAY  CT SCAN Chest   CT Abdomen  Venous Dopplers: LE:   Others          < from: CT Chest No Cont (06.16.18 @ 19:16) >    EXAM:  CT CHEST                            PROCEDURE DATE:  06/16/2018            INTERPRETATION:  CLINICAL INFORMATION: Cough    COMPARISON: None.    PROCEDURE:   CT of the Chest was performed without intravenous contrast.  Sagittal and coronal reformats were performed.      FINDINGS:    CHEST:     LUNGS AND LARGE AIRWAYS: Patent central airways. Bibasilar streaky   atelectasis.. No pulmonary nodules.  PLEURA: No pleural effusion.  VESSELS: Within normal limits.  HEART: Heart size is normal. No pericardial effusion.  MEDIASTINUM AND IBRAHIMA: No lymphadenopathy.  CHEST WALL AND LOWER NECK: Within normal limits.  VISUALIZED UPPER ABDOMEN: Within normal limits.  BONES: Multilevel spinal degenerative changes.    IMPRESSION:     Bibasilar streaky atelectasis.                    MARLENY LÓPEZ M.D., RADIOLOGY RESIDENT  This document has been electronically signed.  ISAAK MERRITT M.D, ATTENDING RADIOLOGIST  This document has been electronically signed. Jun 16 2018  8:52PM        < end of copied text >

## 2018-06-19 LAB
ANION GAP SERPL CALC-SCNC: 16 MMOL/L — SIGNIFICANT CHANGE UP (ref 5–17)
BUN SERPL-MCNC: 26 MG/DL — HIGH (ref 7–23)
CALCIUM SERPL-MCNC: 9.2 MG/DL — SIGNIFICANT CHANGE UP (ref 8.4–10.5)
CHLORIDE SERPL-SCNC: 96 MMOL/L — SIGNIFICANT CHANGE UP (ref 96–108)
CO2 SERPL-SCNC: 25 MMOL/L — SIGNIFICANT CHANGE UP (ref 22–31)
CREAT SERPL-MCNC: 1.03 MG/DL — SIGNIFICANT CHANGE UP (ref 0.5–1.3)
GLUCOSE BLDC GLUCOMTR-MCNC: 189 MG/DL — HIGH (ref 70–99)
GLUCOSE BLDC GLUCOMTR-MCNC: 193 MG/DL — HIGH (ref 70–99)
GLUCOSE BLDC GLUCOMTR-MCNC: 274 MG/DL — HIGH (ref 70–99)
GLUCOSE BLDC GLUCOMTR-MCNC: 321 MG/DL — HIGH (ref 70–99)
GLUCOSE BLDC GLUCOMTR-MCNC: 356 MG/DL — HIGH (ref 70–99)
GLUCOSE SERPL-MCNC: 213 MG/DL — HIGH (ref 70–99)
HBA1C BLD-MCNC: 6.3 % — HIGH (ref 4–5.6)
HCT VFR BLD CALC: 37.1 % — LOW (ref 39–50)
HGB BLD-MCNC: 12.2 G/DL — LOW (ref 13–17)
MCHC RBC-ENTMCNC: 26.7 PG — LOW (ref 27–34)
MCHC RBC-ENTMCNC: 32.9 GM/DL — SIGNIFICANT CHANGE UP (ref 32–36)
MCV RBC AUTO: 81.2 FL — SIGNIFICANT CHANGE UP (ref 80–100)
PLATELET # BLD AUTO: 228 K/UL — SIGNIFICANT CHANGE UP (ref 150–400)
POTASSIUM SERPL-MCNC: 3.9 MMOL/L — SIGNIFICANT CHANGE UP (ref 3.5–5.3)
POTASSIUM SERPL-SCNC: 3.9 MMOL/L — SIGNIFICANT CHANGE UP (ref 3.5–5.3)
RBC # BLD: 4.57 M/UL — SIGNIFICANT CHANGE UP (ref 4.2–5.8)
RBC # FLD: 13.9 % — SIGNIFICANT CHANGE UP (ref 10.3–14.5)
SODIUM SERPL-SCNC: 137 MMOL/L — SIGNIFICANT CHANGE UP (ref 135–145)
WBC # BLD: 16.38 K/UL — HIGH (ref 3.8–10.5)
WBC # FLD AUTO: 16.38 K/UL — HIGH (ref 3.8–10.5)

## 2018-06-19 PROCEDURE — 99232 SBSQ HOSP IP/OBS MODERATE 35: CPT

## 2018-06-19 RX ORDER — AMLODIPINE BESYLATE 2.5 MG/1
5 TABLET ORAL DAILY
Qty: 0 | Refills: 0 | Status: DISCONTINUED | OUTPATIENT
Start: 2018-06-19 | End: 2018-06-20

## 2018-06-19 RX ADMIN — Medication 3: at 21:41

## 2018-06-19 RX ADMIN — Medication 3: at 17:11

## 2018-06-19 RX ADMIN — Medication 3 MILLILITER(S): at 17:12

## 2018-06-19 RX ADMIN — Medication 1: at 09:11

## 2018-06-19 RX ADMIN — MONTELUKAST 10 MILLIGRAM(S): 4 TABLET, CHEWABLE ORAL at 21:01

## 2018-06-19 RX ADMIN — BUDESONIDE AND FORMOTEROL FUMARATE DIHYDRATE 2 PUFF(S): 160; 4.5 AEROSOL RESPIRATORY (INHALATION) at 05:32

## 2018-06-19 RX ADMIN — AMLODIPINE BESYLATE 5 MILLIGRAM(S): 2.5 TABLET ORAL at 22:15

## 2018-06-19 RX ADMIN — Medication 3 MILLILITER(S): at 22:15

## 2018-06-19 RX ADMIN — Medication 3 MILLILITER(S): at 12:30

## 2018-06-19 RX ADMIN — Medication 20 MILLIGRAM(S): at 00:13

## 2018-06-19 RX ADMIN — Medication 100 MILLIGRAM(S): at 22:17

## 2018-06-19 RX ADMIN — Medication 100 MILLIGRAM(S): at 08:32

## 2018-06-19 RX ADMIN — Medication 1200 MILLIGRAM(S): at 17:11

## 2018-06-19 RX ADMIN — Medication 1: at 12:30

## 2018-06-19 RX ADMIN — Medication 20 MILLIGRAM(S): at 17:12

## 2018-06-19 RX ADMIN — BUDESONIDE AND FORMOTEROL FUMARATE DIHYDRATE 2 PUFF(S): 160; 4.5 AEROSOL RESPIRATORY (INHALATION) at 17:11

## 2018-06-19 RX ADMIN — Medication 1200 MILLIGRAM(S): at 05:33

## 2018-06-19 RX ADMIN — Medication 20 MILLIGRAM(S): at 05:33

## 2018-06-19 RX ADMIN — Medication 3 MILLILITER(S): at 00:13

## 2018-06-19 RX ADMIN — Medication 3 MILLILITER(S): at 05:31

## 2018-06-19 RX ADMIN — Medication 20 MILLIGRAM(S): at 23:15

## 2018-06-19 RX ADMIN — Medication 20 MILLIGRAM(S): at 12:30

## 2018-06-19 NOTE — CHART NOTE - NSCHARTNOTEFT_GEN_A_CORE
Called by RN, with pt /71 HR 96.   Pt requesting to take his home medication (S-Numlo 5mg) for BP from [facility].   Pt seen at bedside. Pt is A&Ox3, asymptomatic.   Medication is labeled Numlo (labeled equivalent to amlodipine 5mg).   Pt takes this medication once daily.   Amlodipine 5mg po daily ordered.     Nayana Ruiz ANP-BC  [id number]

## 2018-06-20 ENCOUNTER — TRANSCRIPTION ENCOUNTER (OUTPATIENT)
Age: 68
End: 2018-06-20

## 2018-06-20 VITALS
TEMPERATURE: 98 F | HEART RATE: 79 BPM | SYSTOLIC BLOOD PRESSURE: 110 MMHG | OXYGEN SATURATION: 97 % | DIASTOLIC BLOOD PRESSURE: 61 MMHG | RESPIRATION RATE: 18 BRPM

## 2018-06-20 LAB
ANION GAP SERPL CALC-SCNC: 14 MMOL/L — SIGNIFICANT CHANGE UP (ref 5–17)
BUN SERPL-MCNC: 25 MG/DL — HIGH (ref 7–23)
CALCIUM SERPL-MCNC: 8.6 MG/DL — SIGNIFICANT CHANGE UP (ref 8.4–10.5)
CHLORIDE SERPL-SCNC: 96 MMOL/L — SIGNIFICANT CHANGE UP (ref 96–108)
CO2 SERPL-SCNC: 27 MMOL/L — SIGNIFICANT CHANGE UP (ref 22–31)
CREAT SERPL-MCNC: 1.04 MG/DL — SIGNIFICANT CHANGE UP (ref 0.5–1.3)
GLUCOSE BLDC GLUCOMTR-MCNC: 316 MG/DL — HIGH (ref 70–99)
GLUCOSE SERPL-MCNC: 255 MG/DL — HIGH (ref 70–99)
HCT VFR BLD CALC: 36.1 % — LOW (ref 39–50)
HGB BLD-MCNC: 11.9 G/DL — LOW (ref 13–17)
MCHC RBC-ENTMCNC: 26.7 PG — LOW (ref 27–34)
MCHC RBC-ENTMCNC: 33 GM/DL — SIGNIFICANT CHANGE UP (ref 32–36)
MCV RBC AUTO: 81.1 FL — SIGNIFICANT CHANGE UP (ref 80–100)
PLATELET # BLD AUTO: 214 K/UL — SIGNIFICANT CHANGE UP (ref 150–400)
POTASSIUM SERPL-MCNC: 4.4 MMOL/L — SIGNIFICANT CHANGE UP (ref 3.5–5.3)
POTASSIUM SERPL-SCNC: 4.4 MMOL/L — SIGNIFICANT CHANGE UP (ref 3.5–5.3)
RBC # BLD: 4.45 M/UL — SIGNIFICANT CHANGE UP (ref 4.2–5.8)
RBC # FLD: 13.7 % — SIGNIFICANT CHANGE UP (ref 10.3–14.5)
SODIUM SERPL-SCNC: 137 MMOL/L — SIGNIFICANT CHANGE UP (ref 135–145)
WBC # BLD: 12.02 K/UL — HIGH (ref 3.8–10.5)
WBC # FLD AUTO: 12.02 K/UL — HIGH (ref 3.8–10.5)

## 2018-06-20 PROCEDURE — 82803 BLOOD GASES ANY COMBINATION: CPT

## 2018-06-20 PROCEDURE — 99285 EMERGENCY DEPT VISIT HI MDM: CPT | Mod: 25

## 2018-06-20 PROCEDURE — 83036 HEMOGLOBIN GLYCOSYLATED A1C: CPT

## 2018-06-20 PROCEDURE — 84484 ASSAY OF TROPONIN QUANT: CPT

## 2018-06-20 PROCEDURE — 87486 CHLMYD PNEUM DNA AMP PROBE: CPT

## 2018-06-20 PROCEDURE — 82435 ASSAY OF BLOOD CHLORIDE: CPT

## 2018-06-20 PROCEDURE — 84295 ASSAY OF SERUM SODIUM: CPT

## 2018-06-20 PROCEDURE — 82947 ASSAY GLUCOSE BLOOD QUANT: CPT

## 2018-06-20 PROCEDURE — 83880 ASSAY OF NATRIURETIC PEPTIDE: CPT

## 2018-06-20 PROCEDURE — 80048 BASIC METABOLIC PNL TOTAL CA: CPT

## 2018-06-20 PROCEDURE — 96375 TX/PRO/DX INJ NEW DRUG ADDON: CPT

## 2018-06-20 PROCEDURE — 80053 COMPREHEN METABOLIC PANEL: CPT

## 2018-06-20 PROCEDURE — 87633 RESP VIRUS 12-25 TARGETS: CPT

## 2018-06-20 PROCEDURE — 96376 TX/PRO/DX INJ SAME DRUG ADON: CPT

## 2018-06-20 PROCEDURE — 85014 HEMATOCRIT: CPT

## 2018-06-20 PROCEDURE — G0378: CPT

## 2018-06-20 PROCEDURE — 82962 GLUCOSE BLOOD TEST: CPT

## 2018-06-20 PROCEDURE — 83605 ASSAY OF LACTIC ACID: CPT

## 2018-06-20 PROCEDURE — 85027 COMPLETE CBC AUTOMATED: CPT

## 2018-06-20 PROCEDURE — 71046 X-RAY EXAM CHEST 2 VIEWS: CPT

## 2018-06-20 PROCEDURE — 84132 ASSAY OF SERUM POTASSIUM: CPT

## 2018-06-20 PROCEDURE — 96374 THER/PROPH/DIAG INJ IV PUSH: CPT

## 2018-06-20 PROCEDURE — 87798 DETECT AGENT NOS DNA AMP: CPT

## 2018-06-20 PROCEDURE — 82330 ASSAY OF CALCIUM: CPT

## 2018-06-20 PROCEDURE — 94640 AIRWAY INHALATION TREATMENT: CPT

## 2018-06-20 PROCEDURE — 93005 ELECTROCARDIOGRAM TRACING: CPT

## 2018-06-20 PROCEDURE — 87581 M.PNEUMON DNA AMP PROBE: CPT

## 2018-06-20 PROCEDURE — 71250 CT THORAX DX C-: CPT

## 2018-06-20 RX ORDER — MONTELUKAST 4 MG/1
1 TABLET, CHEWABLE ORAL
Qty: 30 | Refills: 0 | OUTPATIENT
Start: 2018-06-20 | End: 2018-07-19

## 2018-06-20 RX ORDER — BUDESONIDE AND FORMOTEROL FUMARATE DIHYDRATE 160; 4.5 UG/1; UG/1
2 AEROSOL RESPIRATORY (INHALATION)
Qty: 1 | Refills: 0 | OUTPATIENT
Start: 2018-06-20 | End: 2018-06-29

## 2018-06-20 RX ORDER — ALBUTEROL 90 UG/1
2 AEROSOL, METERED ORAL
Qty: 1 | Refills: 0 | OUTPATIENT
Start: 2018-06-20 | End: 2018-06-29

## 2018-06-20 RX ORDER — AMLODIPINE BESYLATE 2.5 MG/1
1 TABLET ORAL
Qty: 30 | Refills: 0 | OUTPATIENT
Start: 2018-06-20 | End: 2018-07-19

## 2018-06-20 RX ADMIN — Medication 3 MILLILITER(S): at 05:19

## 2018-06-20 RX ADMIN — Medication 20 MILLIGRAM(S): at 05:18

## 2018-06-20 RX ADMIN — Medication 1200 MILLIGRAM(S): at 05:19

## 2018-06-20 RX ADMIN — BUDESONIDE AND FORMOTEROL FUMARATE DIHYDRATE 2 PUFF(S): 160; 4.5 AEROSOL RESPIRATORY (INHALATION) at 05:19

## 2018-06-20 RX ADMIN — Medication 4: at 08:25

## 2018-06-20 RX ADMIN — Medication 40 MILLIGRAM(S): at 10:08

## 2018-06-20 NOTE — DISCHARGE NOTE ADULT - CARE PROVIDER_API CALL
Americo Chapman), Critical Care Medicine; Internal Medicine; Pulmonary Disease  [address] [place] [address]  Phone: [phone]  Fax: [phone]

## 2018-06-20 NOTE — DISCHARGE NOTE ADULT - CARE PLAN
Principal Discharge DX:	Acute asthma exacerbation  Goal:	resolution  Assessment and plan of treatment:	take prescribed medication; f/u with pulmonary  Secondary Diagnosis:	DM (diabetes mellitus)  Assessment and plan of treatment:	take your medication from [facility]; [facility] at [facility] [address] [place] [address] [phone]  Secondary Diagnosis:	HTN (hypertension)  Assessment and plan of treatment:	take prescribed mediation; f/u at medicine clinic

## 2018-06-20 NOTE — PROGRESS NOTE ADULT - PROBLEM SELECTOR PLAN 2
started on IV solumedrol IV q 8  -c/w neb rx qid scheduled  -pul consulted
seems to be controlled
started on IV solumedrol IV q 8  -c/w neb rx qid scheduled  -pul consulted
seems to be controlled

## 2018-06-20 NOTE — DISCHARGE NOTE ADULT - PLAN OF CARE
resolution take prescribed medication; f/u with pulmonary take your medication from [facility]; f/u at [address] [phone] take prescribed mediation; f/u at medicine clinic

## 2018-06-20 NOTE — PROGRESS NOTE ADULT - SUBJECTIVE AND OBJECTIVE BOX
Patient is a 68y old  Male who presents with a chief complaint of SOB, wheezing (17 Jun 2018 15:47)    pt. seen and examined, doing better, wheezing improved    INTERVAL HPI/OVERNIGHT EVENTS:  T(C): 36.8 (06-19-18 @ 21:42), Max: 37.2 (06-19-18 @ 14:25)  HR: 96 (06-19-18 @ 21:42) (86 - 96)  BP: 151/71 (06-19-18 @ 21:42) (127/70 - 151/71)  RR: 18 (06-19-18 @ 21:42) (18 - 18)  SpO2: 94% (06-19-18 @ 21:42) (94% - 95%)  Wt(kg): --  I&O's Summary    18 Jun 2018 07:01  -  19 Jun 2018 07:00  --------------------------------------------------------  IN: 720 mL / OUT: 0 mL / NET: 720 mL    19 Jun 2018 07:01  -  20 Jun 2018 01:59  --------------------------------------------------------  IN: 480 mL / OUT: 0 mL / NET: 480 mL        PAST MEDICAL & SURGICAL HISTORY:  HLD (hyperlipidemia)  DM (diabetes mellitus)  HTN (hypertension)      SOCIAL HISTORY  Alcohol:  Tobacco:  Illicit substance use:    FAMILY HISTORY:    REVIEW OF SYSTEMS:  CONSTITUTIONAL: No fever, weight loss, or fatigue  EYES: No eye pain, visual disturbances, or discharge  ENMT:  No difficulty hearing, tinnitus, vertigo; No sinus or throat pain  NECK: No pain or stiffness  RESPIRATORY: No cough, wheezing, chills or hemoptysis; No shortness of breath  CARDIOVASCULAR: No chest pain, palpitations, dizziness, or leg swelling  GASTROINTESTINAL: No abdominal or epigastric pain. No nausea, vomiting, or hematemesis; No diarrhea or constipation. No melena or hematochezia.  GENITOURINARY: No dysuria, frequency, hematuria, or incontinence  NEUROLOGICAL: No headaches, memory loss, loss of strength, numbness, or tremors  SKIN: No itching, burning, rashes, or lesions   LYMPH NODES: No enlarged glands  ENDOCRINE: No heat or cold intolerance; No hair loss  MUSCULOSKELETAL: No joint pain or swelling; No muscle, back, or extremity pain  PSYCHIATRIC: No depression, anxiety, mood swings, or difficulty sleeping  HEME/LYMPH: No easy bruising, or bleeding gums  ALLERY AND IMMUNOLOGIC: No hives or eczema    RADIOLOGY & ADDITIONAL TESTS:    Imaging Personally Reviewed:  [ ] YES  [ ] NO    Consultant(s) Notes Reviewed:  [ ] YES  [ ] NO    PHYSICAL EXAM:  GENERAL: NAD, well-groomed, well-developed  HEAD:  Atraumatic, Normocephalic  EYES: EOMI, PERRLA, conjunctiva and sclera clear  ENMT: No tonsillar erythema, exudates, or enlargement; Moist mucous membranes, Good dentition, No lesions  NECK: Supple, No JVD, Normal thyroid  NERVOUS SYSTEM:  Alert & Oriented X3, Good concentration; Motor Strength 5/5 B/L upper and lower extremities; DTRs 2+ intact and symmetric  CHEST/LUNG: Clear to percussion bilaterally; No rales, rhonchi, wheezing, or rubs  HEART: Regular rate and rhythm; No murmurs, rubs, or gallops  ABDOMEN: Soft, Nontender, Nondistended; Bowel sounds present  EXTREMITIES:  2+ Peripheral Pulses, No clubbing, cyanosis, or edema  LYMPH: No lymphadenopathy noted  SKIN: No rashes or lesions    LABS:                        12.2   16.38 )-----------( 228      ( 19 Jun 2018 08:18 )             37.1     06-19    137  |  96  |  26<H>  ----------------------------<  213<H>  3.9   |  25  |  1.03    Ca    9.2      19 Jun 2018 06:34          CAPILLARY BLOOD GLUCOSE      POCT Blood Glucose.: 321 mg/dL (19 Jun 2018 22:56)  POCT Blood Glucose.: 356 mg/dL (19 Jun 2018 21:37)  POCT Blood Glucose.: 274 mg/dL (19 Jun 2018 16:52)  POCT Blood Glucose.: 189 mg/dL (19 Jun 2018 12:13)  POCT Blood Glucose.: 193 mg/dL (19 Jun 2018 08:06)            MEDICATIONS  (STANDING):  ALBUTerol/ipratropium for Nebulization 3 milliLiter(s) Nebulizer every 6 hours  amLODIPine   Tablet 5 milliGRAM(s) Oral daily  buDESOnide 160 MICROgram(s)/formoterol 4.5 MICROgram(s) Inhaler 2 Puff(s) Inhalation two times a day  dextrose 5%. 1000 milliLiter(s) (50 mL/Hr) IV Continuous <Continuous>  dextrose 50% Injectable 12.5 Gram(s) IV Push once  dextrose 50% Injectable 25 Gram(s) IV Push once  dextrose 50% Injectable 25 Gram(s) IV Push once  guaiFENesin ER 1200 milliGRAM(s) Oral every 12 hours  insulin lispro (HumaLOG) corrective regimen sliding scale   SubCutaneous three times a day before meals  insulin lispro (HumaLOG) corrective regimen sliding scale   SubCutaneous at bedtime  methylPREDNISolone sodium succinate Injectable 20 milliGRAM(s) IV Push every 6 hours  montelukast 10 milliGRAM(s) Oral at bedtime    MEDICATIONS  (PRN):  benzonatate 100 milliGRAM(s) Oral every 8 hours PRN Cough  dextrose 40% Gel 15 Gram(s) Oral once PRN Blood Glucose LESS THAN 70 milliGRAM(s)/deciliter  glucagon  Injectable 1 milliGRAM(s) IntraMuscular once PRN Glucose LESS THAN 70 milligrams/deciliter      Care Discussed with Consultants/Other Providers [ ] YES  [ ] NO
Patient is a 68y old  Male who presents with a chief complaint of SOB, wheezing (20 Jun 2018 08:21)      Any change in ROS: still with wheezy    MEDICATIONS  (STANDING):  ALBUTerol/ipratropium for Nebulization 3 milliLiter(s) Nebulizer every 6 hours  amLODIPine   Tablet 5 milliGRAM(s) Oral daily  buDESOnide 160 MICROgram(s)/formoterol 4.5 MICROgram(s) Inhaler 2 Puff(s) Inhalation two times a day  dextrose 5%. 1000 milliLiter(s) (50 mL/Hr) IV Continuous <Continuous>  dextrose 50% Injectable 12.5 Gram(s) IV Push once  dextrose 50% Injectable 25 Gram(s) IV Push once  dextrose 50% Injectable 25 Gram(s) IV Push once  guaiFENesin ER 1200 milliGRAM(s) Oral every 12 hours  insulin lispro (HumaLOG) corrective regimen sliding scale   SubCutaneous three times a day before meals  insulin lispro (HumaLOG) corrective regimen sliding scale   SubCutaneous at bedtime  montelukast 10 milliGRAM(s) Oral at bedtime  predniSONE   Tablet 40 milliGRAM(s) Oral daily    MEDICATIONS  (PRN):  benzonatate 100 milliGRAM(s) Oral every 8 hours PRN Cough  dextrose 40% Gel 15 Gram(s) Oral once PRN Blood Glucose LESS THAN 70 milliGRAM(s)/deciliter  glucagon  Injectable 1 milliGRAM(s) IntraMuscular once PRN Glucose LESS THAN 70 milligrams/deciliter    Vital Signs Last 24 Hrs  T(C): 36.8 (20 Jun 2018 04:40), Max: 37.2 (19 Jun 2018 14:25)  T(F): 98.3 (20 Jun 2018 04:40), Max: 98.9 (19 Jun 2018 14:25)  HR: 79 (20 Jun 2018 04:40) (79 - 96)  BP: 110/61 (20 Jun 2018 04:40) (110/61 - 151/71)  BP(mean): --  RR: 18 (20 Jun 2018 04:40) (18 - 18)  SpO2: 97% (20 Jun 2018 04:40) (94% - 97%)    I&O's Summary    19 Jun 2018 07:01  -  20 Jun 2018 07:00  --------------------------------------------------------  IN: 660 mL / OUT: 0 mL / NET: 660 mL    20 Jun 2018 07:01  -  20 Jun 2018 09:50  --------------------------------------------------------  IN: 300 mL / OUT: 0 mL / NET: 300 mL          Physical Exam:   GENERAL: NAD, well-groomed, well-developed  HEENT: SEAN/   Atraumatic, Normocephalic  ENMT: No tonsillar erythema, exudates, or enlargement; Moist mucous membranes, Good dentition, No lesions  NECK: Supple, No JVD, Normal thyroid  CHEST/LUNG: wheezing+  CVS: Regular rate and rhythm; No murmurs, rubs, or gallops  GI: : Soft, Nontender, Nondistended; Bowel sounds present  NERVOUS SYSTEM:  Alert & Oriented X3  EXTREMITIES:  2+ Peripheral Pulses, No clubbing, cyanosis, or edema  LYMPH: No lymphadenopathy noted  SKIN: No rashes or lesions  ENDOCRINOLOGY: No Thyromegaly  PSYCH: Appropriate    Labs:  UNK, 22, 22                            11.9   12.02 )-----------( 214      ( 20 Jun 2018 08:00 )             36.1                         12.2   16.38 )-----------( 228      ( 19 Jun 2018 08:18 )             37.1                         14.1   8.6   )-----------( 201      ( 16 Jun 2018 11:16 )             41.5     06-20    137  |  96  |  25<H>  ----------------------------<  255<H>  4.4   |  27  |  1.04  06-19    137  |  96  |  26<H>  ----------------------------<  213<H>  3.9   |  25  |  1.03  06-16    140  |  99  |  16  ----------------------------<  249<H>  4.5   |  21<L>  |  1.15  06-16    140  |  99  |  10  ----------------------------<  161<H>  3.8   |  25  |  1.12    Ca    8.6      20 Jun 2018 06:02  Ca    9.2      19 Jun 2018 06:34    TPro  7.2  /  Alb  4.2  /  TBili  0.3  /  DBili  x   /  AST  16  /  ALT  13  /  AlkPhos  53  06-16  TPro  8.4<H>  /  Alb  4.9  /  TBili  0.6  /  DBili  x   /  AST  16  /  ALT  15  /  AlkPhos  63  06-16    CAPILLARY BLOOD GLUCOSE      POCT Blood Glucose.: 316 mg/dL (20 Jun 2018 08:19)  POCT Blood Glucose.: 321 mg/dL (19 Jun 2018 22:56)  POCT Blood Glucose.: 356 mg/dL (19 Jun 2018 21:37)  POCT Blood Glucose.: 274 mg/dL (19 Jun 2018 16:52)  POCT Blood Glucose.: 189 mg/dL (19 Jun 2018 12:13)            < from: CT Chest No Cont (06.16.18 @ 19:16) >  EXAM:  CT CHEST                            PROCEDURE DATE:  06/16/2018            INTERPRETATION:  CLINICAL INFORMATION: Cough    COMPARISON: None.    PROCEDURE:   CT of the Chest was performed without intravenous contrast.  Sagittal and coronal reformats were performed.      FINDINGS:    CHEST:     LUNGS AND LARGE AIRWAYS: Patent central airways. Bibasilar streaky   atelectasis.. No pulmonary nodules.  PLEURA: No pleural effusion.  VESSELS: Within normal limits.  HEART: Heart size is normal. No pericardial effusion.  MEDIASTINUM AND IBRAHIMA: No lymphadenopathy.  CHEST WALL AND LOWER NECK: Within normal limits.  VISUALIZED UPPER ABDOMEN: Within normal limits.  BONES: Multilevel spinal degenerative changes.    IMPRESSION:     Bibasilar streaky atelectasis.                    MARLENY LÓPEZ M.D., RADIOLOGY RESIDENT  This document has been electronically signed.  ISAAK MERRITT M.D, ATTENDING RADIOLOGIST  This document has been electronically signed. Jun 16 2018  8:52PM    < end of copied text >        RECENT CULTURES:        RESPIRATORY CULTURES:          Studies  Chest X-RAY  CT SCAN Chest   Venous Dopplers: LE:   CT Abdomen  Others
Patient is a 68y old  Male who presents with a chief complaint of SOB, wheezing (17 Jun 2018 15:47)      Any change in ROS: Still wheezing+     MEDICATIONS  (STANDING):  ALBUTerol/ipratropium for Nebulization 3 milliLiter(s) Nebulizer every 6 hours  buDESOnide 160 MICROgram(s)/formoterol 4.5 MICROgram(s) Inhaler 2 Puff(s) Inhalation two times a day  dextrose 5%. 1000 milliLiter(s) (50 mL/Hr) IV Continuous <Continuous>  dextrose 50% Injectable 12.5 Gram(s) IV Push once  dextrose 50% Injectable 25 Gram(s) IV Push once  dextrose 50% Injectable 25 Gram(s) IV Push once  guaiFENesin ER 1200 milliGRAM(s) Oral every 12 hours  insulin lispro (HumaLOG) corrective regimen sliding scale   SubCutaneous three times a day before meals  insulin lispro (HumaLOG) corrective regimen sliding scale   SubCutaneous at bedtime  methylPREDNISolone sodium succinate Injectable 20 milliGRAM(s) IV Push every 6 hours  montelukast 10 milliGRAM(s) Oral at bedtime    MEDICATIONS  (PRN):  benzonatate 100 milliGRAM(s) Oral every 8 hours PRN Cough  dextrose 40% Gel 15 Gram(s) Oral once PRN Blood Glucose LESS THAN 70 milliGRAM(s)/deciliter  glucagon  Injectable 1 milliGRAM(s) IntraMuscular once PRN Glucose LESS THAN 70 milligrams/deciliter    Vital Signs Last 24 Hrs  T(C): 36.5 (19 Jun 2018 05:18), Max: 36.6 (18 Jun 2018 14:09)  T(F): 97.7 (19 Jun 2018 05:18), Max: 97.9 (18 Jun 2018 14:09)  HR: 86 (19 Jun 2018 05:18) (86 - 98)  BP: 144/72 (19 Jun 2018 05:18) (132/62 - 144/72)  BP(mean): --  RR: 18 (19 Jun 2018 05:18) (18 - 18)  SpO2: 94% (19 Jun 2018 05:18) (94% - 96%)    I&O's Summary    18 Jun 2018 07:01  -  19 Jun 2018 07:00  --------------------------------------------------------  IN: 720 mL / OUT: 0 mL / NET: 720 mL          Physical Exam:   GENERAL: NAD, well-groomed, well-developed  HEENT: SEAN/   Atraumatic, Normocephalic  ENMT: No tonsillar erythema, exudates, or enlargement; Moist mucous membranes, Good dentition, No lesions  NECK: Supple, No JVD, Normal thyroid  CHEST/LUNG: wheezing++  CVS: Regular rate and rhythm; No murmurs, rubs, or gallops  GI: : Soft, Nontender, Nondistended; Bowel sounds present  NERVOUS SYSTEM:  Alert & Oriented X3  EXTREMITIES:  2+ Peripheral Pulses, No clubbing, cyanosis, or edema  LYMPH: No lymphadenopathy noted  SKIN: No rashes or lesions  ENDOCRINOLOGY: No Thyromegaly  PSYCH: Appropriate    Labs:  UNK, 22, 22                            12.2   16.38 )-----------( 228      ( 19 Jun 2018 08:18 )             37.1                         14.1   8.6   )-----------( 201      ( 16 Jun 2018 11:16 )             41.5     06-19    137  |  96  |  26<H>  ----------------------------<  213<H>  3.9   |  25  |  1.03  06-16    140  |  99  |  16  ----------------------------<  249<H>  4.5   |  21<L>  |  1.15  06-16    140  |  99  |  10  ----------------------------<  161<H>  3.8   |  25  |  1.12    Ca    9.2      19 Jun 2018 06:34    TPro  7.2  /  Alb  4.2  /  TBili  0.3  /  DBili  x   /  AST  16  /  ALT  13  /  AlkPhos  53  06-16  TPro  8.4<H>  /  Alb  4.9  /  TBili  0.6  /  DBili  x   /  AST  16  /  ALT  15  /  AlkPhos  63  06-16    CAPILLARY BLOOD GLUCOSE      POCT Blood Glucose.: 193 mg/dL (19 Jun 2018 08:06)  POCT Blood Glucose.: 291 mg/dL (18 Jun 2018 21:38)  POCT Blood Glucose.: 235 mg/dL (18 Jun 2018 16:35)  POCT Blood Glucose.: 242 mg/dL (18 Jun 2018 12:12)            Serum Pro-Brain Natriuretic Peptide: 78 pg/mL (06-16 @ 11:16)        RECENT CULTURES:        RESPIRATORY CULTURES:          Studies  Chest X-RAY  CT SCAN Chest   Venous Dopplers: LE:   CT Abdomen  Others        < from: CT Chest No Cont (06.16.18 @ 19:16) >    PROCEDURE DATE:  06/16/2018            INTERPRETATION:  CLINICAL INFORMATION: Cough    COMPARISON: None.    PROCEDURE:   CT of the Chest was performed without intravenous contrast.  Sagittal and coronal reformats were performed.      FINDINGS:    CHEST:     LUNGS AND LARGE AIRWAYS: Patent central airways. Bibasilar streaky   atelectasis.. No pulmonary nodules.  PLEURA: No pleural effusion.  VESSELS: Within normal limits.  HEART: Heart size is normal. No pericardial effusion.  MEDIASTINUM AND IBRAHIMA: No lymphadenopathy.  CHEST WALL AND LOWER NECK: Within normal limits.  VISUALIZED UPPER ABDOMEN: Within normal limits.  BONES: Multilevel spinal degenerative changes.    IMPRESSION:     Bibasilar streaky atelectasis.                    MARLENY LÓPEZ M.D., RADIOLOGY RESIDENT  This document has been electronically signed.  ISAAK MERRITT M.D, ATTENDING RADIOLOGIST  This document has been electronically signed. Jun 16 2018  8:52PM    < end of copied text >
Patient is a 68y old  Male who presents with a chief complaint of SOB, wheezing (17 Jun 2018 15:47)    pt. franci nd examined, B/L exp wheezing     INTERVAL HPI/OVERNIGHT EVENTS:  T(C): 36.8 (06-19-18 @ 21:42), Max: 37.2 (06-19-18 @ 14:25)  HR: 96 (06-19-18 @ 21:42) (86 - 96)  BP: 151/71 (06-19-18 @ 21:42) (127/70 - 151/71)  RR: 18 (06-19-18 @ 21:42) (18 - 18)  SpO2: 94% (06-19-18 @ 21:42) (94% - 95%)  Wt(kg): --  I&O's Summary    18 Jun 2018 07:01  -  19 Jun 2018 07:00  --------------------------------------------------------  IN: 720 mL / OUT: 0 mL / NET: 720 mL    19 Jun 2018 07:01  -  20 Jun 2018 01:57  --------------------------------------------------------  IN: 480 mL / OUT: 0 mL / NET: 480 mL        PAST MEDICAL & SURGICAL HISTORY:  HLD (hyperlipidemia)  DM (diabetes mellitus)  HTN (hypertension)      SOCIAL HISTORY  Alcohol:  Tobacco:  Illicit substance use:    FAMILY HISTORY:    REVIEW OF SYSTEMS:  CONSTITUTIONAL: No fever, weight loss, or fatigue  EYES: No eye pain, visual disturbances, or discharge  ENMT:  No difficulty hearing, tinnitus, vertigo; No sinus or throat pain  NECK: No pain or stiffness  RESPIRATORY: No cough, wheezing, chills or hemoptysis; No shortness of breath  CARDIOVASCULAR: No chest pain, palpitations, dizziness, or leg swelling  GASTROINTESTINAL: No abdominal or epigastric pain. No nausea, vomiting, or hematemesis; No diarrhea or constipation. No melena or hematochezia.  GENITOURINARY: No dysuria, frequency, hematuria, or incontinence  NEUROLOGICAL: No headaches, memory loss, loss of strength, numbness, or tremors  SKIN: No itching, burning, rashes, or lesions   LYMPH NODES: No enlarged glands  ENDOCRINE: No heat or cold intolerance; No hair loss  MUSCULOSKELETAL: No joint pain or swelling; No muscle, back, or extremity pain  PSYCHIATRIC: No depression, anxiety, mood swings, or difficulty sleeping  HEME/LYMPH: No easy bruising, or bleeding gums  ALLERY AND IMMUNOLOGIC: No hives or eczema    RADIOLOGY & ADDITIONAL TESTS:    Imaging Personally Reviewed:  [ ] YES  [ ] NO    Consultant(s) Notes Reviewed:  [ ] YES  [ ] NO    PHYSICAL EXAM:  GENERAL: NAD, well-groomed, well-developed  HEAD:  Atraumatic, Normocephalic  EYES: EOMI, PERRLA, conjunctiva and sclera clear  ENMT: No tonsillar erythema, exudates, or enlargement; Moist mucous membranes, Good dentition, No lesions  NECK: Supple, No JVD, Normal thyroid  NERVOUS SYSTEM:  Alert & Oriented X3, Good concentration; Motor Strength 5/5 B/L upper and lower extremities; DTRs 2+ intact and symmetric  CHEST/LUNG: Clear to percussion bilaterally; No rales, rhonchi, wheezing, or rubs  HEART: Regular rate and rhythm; No murmurs, rubs, or gallops  ABDOMEN: Soft, Nontender, Nondistended; Bowel sounds present  EXTREMITIES:  2+ Peripheral Pulses, No clubbing, cyanosis, or edema  LYMPH: No lymphadenopathy noted  SKIN: No rashes or lesions    LABS:                        12.2   16.38 )-----------( 228      ( 19 Jun 2018 08:18 )             37.1     06-19    137  |  96  |  26<H>  ----------------------------<  213<H>  3.9   |  25  |  1.03    Ca    9.2      19 Jun 2018 06:34          CAPILLARY BLOOD GLUCOSE      POCT Blood Glucose.: 321 mg/dL (19 Jun 2018 22:56)  POCT Blood Glucose.: 356 mg/dL (19 Jun 2018 21:37)  POCT Blood Glucose.: 274 mg/dL (19 Jun 2018 16:52)  POCT Blood Glucose.: 189 mg/dL (19 Jun 2018 12:13)  POCT Blood Glucose.: 193 mg/dL (19 Jun 2018 08:06)            MEDICATIONS  (STANDING):  ALBUTerol/ipratropium for Nebulization 3 milliLiter(s) Nebulizer every 6 hours  amLODIPine   Tablet 5 milliGRAM(s) Oral daily  buDESOnide 160 MICROgram(s)/formoterol 4.5 MICROgram(s) Inhaler 2 Puff(s) Inhalation two times a day  dextrose 5%. 1000 milliLiter(s) (50 mL/Hr) IV Continuous <Continuous>  dextrose 50% Injectable 12.5 Gram(s) IV Push once  dextrose 50% Injectable 25 Gram(s) IV Push once  dextrose 50% Injectable 25 Gram(s) IV Push once  guaiFENesin ER 1200 milliGRAM(s) Oral every 12 hours  insulin lispro (HumaLOG) corrective regimen sliding scale   SubCutaneous three times a day before meals  insulin lispro (HumaLOG) corrective regimen sliding scale   SubCutaneous at bedtime  methylPREDNISolone sodium succinate Injectable 20 milliGRAM(s) IV Push every 6 hours  montelukast 10 milliGRAM(s) Oral at bedtime    MEDICATIONS  (PRN):  benzonatate 100 milliGRAM(s) Oral every 8 hours PRN Cough  dextrose 40% Gel 15 Gram(s) Oral once PRN Blood Glucose LESS THAN 70 milliGRAM(s)/deciliter  glucagon  Injectable 1 milliGRAM(s) IntraMuscular once PRN Glucose LESS THAN 70 milligrams/deciliter      Care Discussed with Consultants/Other Providers [ ] YES  [ ] NO

## 2018-06-20 NOTE — PROGRESS NOTE ADULT - PROBLEM SELECTOR PLAN 1
doing better  -c/w oxygen
cont iv steroids: pt is still with severe wheezing: Cont BD, need outpt full PFT
doing better  -c/w oxygen
cont iv steroids: pt is still with severe wheezing: Cont BD, need outpt full PFT  6/20: allegra princek : can change to oral steroids for 5 days

## 2018-06-20 NOTE — PROGRESS NOTE ADULT - PROBLEM SELECTOR PLAN 3
pt. take some [place] medication   Insulin FSBS
little on the higher side secondary to steroids: hopefully will improve with decreasing steroids: Pt is still wheezing a lot:
pt. take some Cam[place] medication   Insulin FSBS
little on the higher side secondary to steroids: hopefully will improve with decreasing steroids: Pt is still wheezing a lot:  6/20' uncontrolled dm , secondary to steroids

## 2018-06-20 NOTE — DISCHARGE NOTE ADULT - PATIENT PORTAL LINK FT
You can access the [facility] Patient Portal, offered by [facility], by registering with the following website: [email or web address]

## 2018-06-20 NOTE — PROGRESS NOTE ADULT - ASSESSMENT
69 yo male with hx of HTN DM bronchitis presenting with 2 day hx of wheezing nonproductive cough and shortness of breath.  worse with exertion, temporarily improved with nebulizer.  no recent travel no sick contacts.  endorses runny nose.  no hx of asthma.  endorses low grade fevers.   ot says he was diagnosed with bronchitis many years ago and he was  using inhalers but stopped few months ago as he was having no problems: He was also told one time that he needs to take steroids with tapering dosages if his asthma got worse: Currently he has been wheezing alot and for past two nights at home, he woke up early in AM due to wheezing:
69 yo male with hx of HTN DM bronchitis presenting with 2 day hx of wheezing nonproductive cough and shortness of breath.  worse with exertion, temporarily improved with nebulizer.  no recent travel no sick contacts.  endorses runny nose.  no hx of asthma.  endorses low grade fevers.   ot says he was diagnosed with bronchitis many years ago and he was  using inhalers but stopped few months ago as he was having no problems: He was also told one time that he needs to take steroids with tapering dosages if his asthma got worse: Currently he has been wheezing alot and for past two nights at home, he woke up early in AM due to wheezing:

## 2018-06-20 NOTE — DISCHARGE NOTE ADULT - MEDICATION SUMMARY - MEDICATIONS TO TAKE
I will START or STAY ON the medications listed below when I get home from the hospital:    predniSONE 20 mg oral tablet  -- 2 tab(s) by mouth once a day -for diarrhea  start tomorrow 6/21  -- Indication: For Antiinflammatory    benzonatate 100 mg oral capsule  -- 1 cap(s) by mouth every 8 hours, As needed, Cough  -- Indication: For cough    Proventil HFA 90 mcg/inh inhalation aerosol  -- 2 puff(s) inhaled 4 times a day   -- For inhalation only.  It is very important that you take or use this exactly as directed.  Do not skip doses or discontinue unless directed by your doctor.  Obtain medical advice before taking any non-prescription drugs as some may affect the action of this medication.  Shake well before use.    -- Indication: For breathing    budesonide-formoterol 160 mcg-4.5 mcg/inh inhalation aerosol  -- 2 puff(s) inhaled 2 times a day   -- Indication: For breathing    amLODIPine 5 mg oral tablet  -- 1 tab(s) by mouth once a day x 30 days   -- Indication: For HTN (hypertension)    guaiFENesin 1200 mg oral tablet, extended release  -- 1 tab(s) by mouth every 12 hours  -- Indication: For cough    montelukast 10 mg oral tablet  -- 1 tab(s) by mouth once a day (at bedtime)  -- Indication: For Asthma

## 2018-06-20 NOTE — PROGRESS NOTE ADULT - PROBLEM SELECTOR PROBLEM 1
Acute respiratory failure with hypoxia
Acute asthma exacerbation
Acute respiratory failure with hypoxia
Acute asthma exacerbation

## 2018-06-20 NOTE — DISCHARGE NOTE ADULT - HOSPITAL COURSE
69 yo male admitted with asthma exacerbation; treated with IV steroids; nebulizer treatments; symbicort; and singulair. Pt will f/u at [facility] for diabetes management as he is currently taking medications from [facility]. He will be d/c on prednisone, proventil inhaler, symbicort and singulair; f/u with pulmonary

## 2018-11-19 ENCOUNTER — INPATIENT (INPATIENT)
Facility: HOSPITAL | Age: 68
LOS: 7 days | Discharge: HOME | DRG: 178 | End: 2018-11-27
Attending: INTERNAL MEDICINE | Admitting: INTERNAL MEDICINE
Payer: MEDICAID

## 2018-11-19 VITALS
HEIGHT: 69 IN | RESPIRATION RATE: 20 BRPM | TEMPERATURE: 98 F | SYSTOLIC BLOOD PRESSURE: 150 MMHG | HEART RATE: 115 BPM | DIASTOLIC BLOOD PRESSURE: 85 MMHG | OXYGEN SATURATION: 95 % | WEIGHT: 145.51 LBS

## 2018-11-19 LAB
ALBUMIN SERPL ELPH-MCNC: 3.9 G/DL — SIGNIFICANT CHANGE UP (ref 3.3–5)
ALP SERPL-CCNC: 61 U/L — SIGNIFICANT CHANGE UP (ref 40–120)
ALT FLD-CCNC: 11 U/L — SIGNIFICANT CHANGE UP (ref 10–45)
ANION GAP SERPL CALC-SCNC: 16 MMOL/L — SIGNIFICANT CHANGE UP (ref 5–17)
AST SERPL-CCNC: 10 U/L — SIGNIFICANT CHANGE UP (ref 10–40)
BASE EXCESS BLDV CALC-SCNC: 2.2 MMOL/L — HIGH (ref -2–2)
BASOPHILS # BLD AUTO: 0 K/UL — SIGNIFICANT CHANGE UP (ref 0–0.2)
BASOPHILS NFR BLD AUTO: 0.3 % — SIGNIFICANT CHANGE UP (ref 0–2)
BILIRUB SERPL-MCNC: 0.3 MG/DL — SIGNIFICANT CHANGE UP (ref 0.2–1.2)
BUN SERPL-MCNC: 11 MG/DL — SIGNIFICANT CHANGE UP (ref 7–23)
CA-I SERPL-SCNC: 1.15 MMOL/L — SIGNIFICANT CHANGE UP (ref 1.12–1.3)
CALCIUM SERPL-MCNC: 9.4 MG/DL — SIGNIFICANT CHANGE UP (ref 8.4–10.5)
CHLORIDE BLDV-SCNC: 103 MMOL/L — SIGNIFICANT CHANGE UP (ref 96–108)
CHLORIDE SERPL-SCNC: 97 MMOL/L — SIGNIFICANT CHANGE UP (ref 96–108)
CO2 BLDV-SCNC: 28 MMOL/L — SIGNIFICANT CHANGE UP (ref 22–30)
CO2 SERPL-SCNC: 23 MMOL/L — SIGNIFICANT CHANGE UP (ref 22–31)
CREAT SERPL-MCNC: 0.89 MG/DL — SIGNIFICANT CHANGE UP (ref 0.5–1.3)
EOSINOPHIL # BLD AUTO: 0.3 K/UL — SIGNIFICANT CHANGE UP (ref 0–0.5)
EOSINOPHIL NFR BLD AUTO: 3 % — SIGNIFICANT CHANGE UP (ref 0–6)
GAS PNL BLDV: 133 MMOL/L — LOW (ref 136–145)
GAS PNL BLDV: SIGNIFICANT CHANGE UP
GAS PNL BLDV: SIGNIFICANT CHANGE UP
GLUCOSE BLDV-MCNC: 204 MG/DL — HIGH (ref 70–99)
GLUCOSE SERPL-MCNC: 204 MG/DL — HIGH (ref 70–99)
HCO3 BLDV-SCNC: 26 MMOL/L — SIGNIFICANT CHANGE UP (ref 21–29)
HCT VFR BLD CALC: 36.3 % — LOW (ref 39–50)
HCT VFR BLDA CALC: 38 % — LOW (ref 39–50)
HGB BLD CALC-MCNC: 12.4 G/DL — LOW (ref 13–17)
HGB BLD-MCNC: 12.4 G/DL — LOW (ref 13–17)
LACTATE BLDV-MCNC: 1.9 MMOL/L — SIGNIFICANT CHANGE UP (ref 0.7–2)
LYMPHOCYTES # BLD AUTO: 1.2 K/UL — SIGNIFICANT CHANGE UP (ref 1–3.3)
LYMPHOCYTES # BLD AUTO: 12.7 % — LOW (ref 13–44)
MCHC RBC-ENTMCNC: 26.9 PG — LOW (ref 27–34)
MCHC RBC-ENTMCNC: 34.2 GM/DL — SIGNIFICANT CHANGE UP (ref 32–36)
MCV RBC AUTO: 78.6 FL — LOW (ref 80–100)
MONOCYTES # BLD AUTO: 0.9 K/UL — SIGNIFICANT CHANGE UP (ref 0–0.9)
MONOCYTES NFR BLD AUTO: 9.8 % — SIGNIFICANT CHANGE UP (ref 2–14)
NEUTROPHILS # BLD AUTO: 7.1 K/UL — SIGNIFICANT CHANGE UP (ref 1.8–7.4)
NEUTROPHILS NFR BLD AUTO: 74.2 % — SIGNIFICANT CHANGE UP (ref 43–77)
OTHER CELLS CSF MANUAL: 10 ML/DL — LOW (ref 18–22)
PCO2 BLDV: 42 MMHG — SIGNIFICANT CHANGE UP (ref 35–50)
PH BLDV: 7.42 — SIGNIFICANT CHANGE UP (ref 7.35–7.45)
PLATELET # BLD AUTO: 345 K/UL — SIGNIFICANT CHANGE UP (ref 150–400)
PO2 BLDV: <50 MMHG — SIGNIFICANT CHANGE UP (ref 25–45)
POTASSIUM BLDV-SCNC: 3.7 MMOL/L — SIGNIFICANT CHANGE UP (ref 3.5–5.3)
POTASSIUM SERPL-MCNC: 3.8 MMOL/L — SIGNIFICANT CHANGE UP (ref 3.5–5.3)
POTASSIUM SERPL-SCNC: 3.8 MMOL/L — SIGNIFICANT CHANGE UP (ref 3.5–5.3)
PROT SERPL-MCNC: 7.5 G/DL — SIGNIFICANT CHANGE UP (ref 6–8.3)
RAPID RVP RESULT: DETECTED
RBC # BLD: 4.62 M/UL — SIGNIFICANT CHANGE UP (ref 4.2–5.8)
RBC # FLD: 12.8 % — SIGNIFICANT CHANGE UP (ref 10.3–14.5)
RV+EV RNA SPEC QL NAA+PROBE: DETECTED
SAO2 % BLDV: 62 % — LOW (ref 67–88)
SODIUM SERPL-SCNC: 136 MMOL/L — SIGNIFICANT CHANGE UP (ref 135–145)
TROPONIN T, HIGH SENSITIVITY RESULT: 22 NG/L — SIGNIFICANT CHANGE UP (ref 0–51)
WBC # BLD: 9.6 K/UL — SIGNIFICANT CHANGE UP (ref 3.8–10.5)
WBC # FLD AUTO: 9.6 K/UL — SIGNIFICANT CHANGE UP (ref 3.8–10.5)

## 2018-11-19 PROCEDURE — 71045 X-RAY EXAM CHEST 1 VIEW: CPT | Mod: 26

## 2018-11-19 PROCEDURE — 93010 ELECTROCARDIOGRAM REPORT: CPT

## 2018-11-19 PROCEDURE — 99284 EMERGENCY DEPT VISIT MOD MDM: CPT | Mod: 25

## 2018-11-19 PROCEDURE — 71250 CT THORAX DX C-: CPT | Mod: 26

## 2018-11-19 RX ORDER — ALBUTEROL 90 UG/1
2.5 AEROSOL, METERED ORAL
Qty: 0 | Refills: 0 | Status: COMPLETED | OUTPATIENT
Start: 2018-11-19 | End: 2018-11-19

## 2018-11-19 RX ORDER — CEFTRIAXONE 500 MG/1
1 INJECTION, POWDER, FOR SOLUTION INTRAMUSCULAR; INTRAVENOUS ONCE
Qty: 0 | Refills: 0 | Status: COMPLETED | OUTPATIENT
Start: 2018-11-19 | End: 2018-11-19

## 2018-11-19 RX ORDER — AZITHROMYCIN 500 MG/1
500 TABLET, FILM COATED ORAL ONCE
Qty: 0 | Refills: 0 | Status: COMPLETED | OUTPATIENT
Start: 2018-11-19 | End: 2018-11-19

## 2018-11-19 RX ORDER — IPRATROPIUM/ALBUTEROL SULFATE 18-103MCG
3 AEROSOL WITH ADAPTER (GRAM) INHALATION EVERY 6 HOURS
Qty: 0 | Refills: 0 | Status: DISCONTINUED | OUTPATIENT
Start: 2018-11-19 | End: 2018-11-27

## 2018-11-19 RX ADMIN — Medication 3 MILLILITER(S): at 19:38

## 2018-11-19 RX ADMIN — ALBUTEROL 2.5 MILLIGRAM(S): 90 AEROSOL, METERED ORAL at 21:44

## 2018-11-19 RX ADMIN — Medication 60 MILLIGRAM(S): at 19:39

## 2018-11-19 NOTE — ED STATDOCS - PROGRESS NOTE DETAILS
Called to Dr. Espinosa office. Called to Dr. Espinosa office who is in agreement with admission and eval for ILD vs. COPD exacerbation vs. PNA. Called to Dr. Espinosa office who is in agreement with admission and eval for ILD vs. COPD exacerbation vs. PNA.  Pt's lungs are coarsely ronchorous bilaterally.  He does not appear in acute resp distress but warrants expedited breathing tx 2/2 very severely congested breath sounds bilaterally.  Charge called to expedite movement back to main ED.

## 2018-11-19 NOTE — ED ADULT TRIAGE NOTE - CHIEF COMPLAINT QUOTE
Patient came to the ED c/o shortness of breath and right sided chest pain for 2 days that is intermittent worse with exertion and headache which he currently denies.  Patient has h/o COPD and uses nebulizer daily and took it today and said it helps with breathing.  Patient sent to ED from PCP, DR. Henrique Espinosa for tachycardia and symptoms.  Patient endorses fever with tmax of 102 and last fever this morning and patient took Motrin last night.  Patient denies; sick contacts, pain or burning on urination.

## 2018-11-19 NOTE — ED PROVIDER NOTE - OBJECTIVE STATEMENT
69 yo M c PMH of COPD (not on home O2), DM, HTN, HLD p/w 10 day hx of intermittent fever, SOB, cough with increased green sputum production from baseline that worsened today associated with non-radiating R pleuritic CP today that only occurs when breathing in. Pt seen by PMD Dr Espinosa sent to ED to r/o COPD exacerbation. pt hospitalized in May with similar sx.     ROS positive: fever, CP, SOB, cough, sputum production  ROS negative: hemoptysis, n/v, leg edema

## 2018-11-19 NOTE — ED STATDOCS - NS_ ATTENDINGSCRIBEDETAILS _ED_A_ED_FT
Attending MD Mckinney.  Agree with above.  PT seen and assessed with scribe assistance in documentation in real time.     PCP Dr. Henrique Espinosa [phone] called in triage admit to full time hospitalist, concern for viral COPD exac vs ILD gen'lized malaise, intermittent fevers, cough, congest x 10 days, tachy to 140's in PCP office, sating 89% there    I saw pt briefly in ED triage and ordered appropriate study as indicated by brief visit. Transferred care to main ED team for ongoing management.

## 2018-11-19 NOTE — ED ADULT NURSE NOTE - OBJECTIVE STATEMENT
68 year old male presented to ED with c/o of SOB starting 2 hours before ED arrival. Pt sent from PMD for tachycardia (HR ~110). Pt has hx of COPD and states 'I did three duo neb treatments at home with relief, but started becoming SOB again'. Pt not SOB in ED, denies CP, nausea/vomiting, numbness/tingling, fever, cough, chills, dizziness, headache, blurred vision, neuro intact. Pt a&ox3, lung sounds clear, heart rate tachy, EKG normal sinus handed to MD, abdomen soft nontender nondistended to palp. Skin intact. Pt currently resting in bed upright and comfortably with family at bedside, pending x ray. Will continue to monitor and assess while offering support and reassurance.

## 2018-11-19 NOTE — ED PROVIDER NOTE - ATTENDING CONTRIBUTION TO CARE
Patient with history of COPD presenting with shortness of breath, productive cough, nasal conjestion/rhinorrhea, fevers at home.  PMD sent patient to Emergency Department.  Trying albuterol at home without relief.  No known sick contacts, no recent travel, no influenza vaccine this year.    On exam patient non toxic appearing, tachycardic, not hypoxic, regular tachycardia, diffuse coarse wheezing throughout lung fields, abdomen soft, non tender, non distended.  Speaking in full sentences without respiratory distress.    COPD exacerbation with likely underlying bacterial vs viral infection, plan for labs, CXR, RVP, nebs/steroids and likely CDU vs admission for continued therapy.

## 2018-11-19 NOTE — ED PROVIDER NOTE - PMH
DM (diabetes mellitus)    HLD (hyperlipidemia)    HTN (hypertension) COPD (chronic obstructive pulmonary disease)    DM (diabetes mellitus)    HLD (hyperlipidemia)    HTN (hypertension)

## 2018-11-19 NOTE — ED ADULT NURSE NOTE - NSIMPLEMENTINTERV_GEN_ALL_ED
Implemented All Universal Safety Interventions:  [place] to call system. Call bell, personal items and telephone within reach. Instruct patient to call for assistance. Room bathroom lighting operational. Non-slip footwear when patient is off stretcher. Physically safe environment: no spills, clutter or unnecessary equipment. Stretcher in lowest position, wheels locked, appropriate side rails in place.

## 2018-11-19 NOTE — ED PROVIDER NOTE - MEDICAL DECISION MAKING DETAILS
67 yo M c PMH of COPD (not on home O2), DM, HTN, HLD p/w 10 day hx of intermittent fever, SOB, cough with increased green sputum production from baseline that worsened today associated with non-radiating R pleuritic CP today that only occurs when breathing in. On exam, , VS otherwise wnl, pt has diffuse wheezes and ronchi. s/p duonebs, prednisone, labs grossly wnl, CT chest shows ground glass opacities may be 2/2 infection, ordered for ceftriaxone and azithromycin. on reassessment pt appears well however will admit vs cdu for copd exacerbation with possible pna

## 2018-11-19 NOTE — ED ADULT NURSE NOTE - CHPI ED NUR SYMPTOMS NEG
no edema/no headache/no hemoptysis/no shortness of breath/no chills/no cough/no diaphoresis/no fever/no body aches/no chest pain/no wheezing

## 2018-11-19 NOTE — ED STATDOCS - OBJECTIVE STATEMENT
68 year old male with pmhx of Hypertension, type 2 diabetes, COPD on prednisone presents from PMD Dr. Espinosa office with severe SOB, chest discomfort, high fever and  in office. Patient reports symptoms have been going on for 10 days. Had EKG that showed sinus tachycardia. Sent patient to ER for further evaluation and to r/o pneumonia and ILD. Patient reports having stuffy nose, cough, cold like symptoms. Takes nebulizer treatment at home. No hx of heart disease.

## 2018-11-19 NOTE — ED PROVIDER NOTE - PROGRESS NOTE DETAILS
Danelle BLISS: pt appears improved s/p duonebs however has worsened CT scan showing possible infection, in June was admitted to CDU then needed inpatient admission for worsening sx will admit

## 2018-11-20 DIAGNOSIS — E10.9 TYPE 1 DIABETES MELLITUS WITHOUT COMPLICATIONS: ICD-10-CM

## 2018-11-20 DIAGNOSIS — J45.41 MODERATE PERSISTENT ASTHMA WITH (ACUTE) EXACERBATION: ICD-10-CM

## 2018-11-20 DIAGNOSIS — J44.1 CHRONIC OBSTRUCTIVE PULMONARY DISEASE WITH (ACUTE) EXACERBATION: ICD-10-CM

## 2018-11-20 DIAGNOSIS — E78.49 OTHER HYPERLIPIDEMIA: ICD-10-CM

## 2018-11-20 DIAGNOSIS — I10 ESSENTIAL (PRIMARY) HYPERTENSION: ICD-10-CM

## 2018-11-20 DIAGNOSIS — J15.6 PNEUMONIA DUE TO OTHER GRAM-NEGATIVE BACTERIA: ICD-10-CM

## 2018-11-20 DIAGNOSIS — B34.8 OTHER VIRAL INFECTIONS OF UNSPECIFIED SITE: ICD-10-CM

## 2018-11-20 PROBLEM — E78.5 HYPERLIPIDEMIA, UNSPECIFIED: Chronic | Status: ACTIVE | Noted: 2018-06-16

## 2018-11-20 PROBLEM — E11.9 TYPE 2 DIABETES MELLITUS WITHOUT COMPLICATIONS: Chronic | Status: ACTIVE | Noted: 2018-06-16

## 2018-11-20 LAB
ANION GAP SERPL CALC-SCNC: 13 MMOL/L — SIGNIFICANT CHANGE UP (ref 5–17)
BUN SERPL-MCNC: 18 MG/DL — SIGNIFICANT CHANGE UP (ref 7–23)
CALCIUM SERPL-MCNC: 10.1 MG/DL — SIGNIFICANT CHANGE UP (ref 8.4–10.5)
CHLORIDE SERPL-SCNC: 98 MMOL/L — SIGNIFICANT CHANGE UP (ref 96–108)
CO2 SERPL-SCNC: 24 MMOL/L — SIGNIFICANT CHANGE UP (ref 22–31)
CREAT SERPL-MCNC: 0.89 MG/DL — SIGNIFICANT CHANGE UP (ref 0.5–1.3)
GLUCOSE BLDC GLUCOMTR-MCNC: 180 MG/DL — HIGH (ref 70–99)
GLUCOSE BLDC GLUCOMTR-MCNC: 211 MG/DL — HIGH (ref 70–99)
GLUCOSE BLDC GLUCOMTR-MCNC: 372 MG/DL — HIGH (ref 70–99)
GLUCOSE SERPL-MCNC: 349 MG/DL — HIGH (ref 70–99)
HCT VFR BLD CALC: 34.8 % — LOW (ref 39–50)
HGB BLD-MCNC: 11.4 G/DL — LOW (ref 13–17)
MCHC RBC-ENTMCNC: 25.7 PG — LOW (ref 27–34)
MCHC RBC-ENTMCNC: 32.9 GM/DL — SIGNIFICANT CHANGE UP (ref 32–36)
MCV RBC AUTO: 78.3 FL — LOW (ref 80–100)
PLATELET # BLD AUTO: 347 K/UL — SIGNIFICANT CHANGE UP (ref 150–400)
POTASSIUM SERPL-MCNC: 4.2 MMOL/L — SIGNIFICANT CHANGE UP (ref 3.5–5.3)
POTASSIUM SERPL-SCNC: 4.2 MMOL/L — SIGNIFICANT CHANGE UP (ref 3.5–5.3)
RBC # BLD: 4.44 M/UL — SIGNIFICANT CHANGE UP (ref 4.2–5.8)
RBC # FLD: 12.2 % — SIGNIFICANT CHANGE UP (ref 10.3–14.5)
SODIUM SERPL-SCNC: 135 MMOL/L — SIGNIFICANT CHANGE UP (ref 135–145)
TROPONIN T, HIGH SENSITIVITY RESULT: 20 NG/L — SIGNIFICANT CHANGE UP (ref 0–51)
WBC # BLD: 10.3 K/UL — SIGNIFICANT CHANGE UP (ref 3.8–10.5)
WBC # FLD AUTO: 10.3 K/UL — SIGNIFICANT CHANGE UP (ref 3.8–10.5)

## 2018-11-20 PROCEDURE — 99223 1ST HOSP IP/OBS HIGH 75: CPT

## 2018-11-20 RX ORDER — IPRATROPIUM/ALBUTEROL SULFATE 18-103MCG
3 AEROSOL WITH ADAPTER (GRAM) INHALATION EVERY 6 HOURS
Qty: 0 | Refills: 0 | Status: DISCONTINUED | OUTPATIENT
Start: 2018-11-20 | End: 2018-11-22

## 2018-11-20 RX ORDER — CEFTRIAXONE 500 MG/1
1 INJECTION, POWDER, FOR SOLUTION INTRAMUSCULAR; INTRAVENOUS ONCE
Qty: 0 | Refills: 0 | Status: DISCONTINUED | OUTPATIENT
Start: 2018-11-20 | End: 2018-11-20

## 2018-11-20 RX ORDER — BUDESONIDE AND FORMOTEROL FUMARATE DIHYDRATE 160; 4.5 UG/1; UG/1
2 AEROSOL RESPIRATORY (INHALATION)
Qty: 0 | Refills: 0 | Status: DISCONTINUED | OUTPATIENT
Start: 2018-11-20 | End: 2018-11-27

## 2018-11-20 RX ORDER — AZITHROMYCIN 500 MG/1
TABLET, FILM COATED ORAL
Qty: 0 | Refills: 0 | Status: DISCONTINUED | OUTPATIENT
Start: 2018-11-20 | End: 2018-11-20

## 2018-11-20 RX ORDER — ENOXAPARIN SODIUM 100 MG/ML
40 INJECTION SUBCUTANEOUS EVERY 24 HOURS
Qty: 0 | Refills: 0 | Status: DISCONTINUED | OUTPATIENT
Start: 2018-11-20 | End: 2018-11-27

## 2018-11-20 RX ORDER — INSULIN LISPRO 100/ML
VIAL (ML) SUBCUTANEOUS
Qty: 0 | Refills: 0 | Status: DISCONTINUED | OUTPATIENT
Start: 2018-11-20 | End: 2018-11-24

## 2018-11-20 RX ORDER — AZITHROMYCIN 500 MG/1
500 TABLET, FILM COATED ORAL EVERY 24 HOURS
Qty: 0 | Refills: 0 | Status: DISCONTINUED | OUTPATIENT
Start: 2018-11-21 | End: 2018-11-25

## 2018-11-20 RX ORDER — MONTELUKAST 4 MG/1
10 TABLET, CHEWABLE ORAL AT BEDTIME
Qty: 0 | Refills: 0 | Status: DISCONTINUED | OUTPATIENT
Start: 2018-11-20 | End: 2018-11-27

## 2018-11-20 RX ORDER — DEXTROSE 50 % IN WATER 50 %
15 SYRINGE (ML) INTRAVENOUS ONCE
Qty: 0 | Refills: 0 | Status: DISCONTINUED | OUTPATIENT
Start: 2018-11-20 | End: 2018-11-27

## 2018-11-20 RX ORDER — CEFTRIAXONE 500 MG/1
1 INJECTION, POWDER, FOR SOLUTION INTRAMUSCULAR; INTRAVENOUS EVERY 24 HOURS
Qty: 0 | Refills: 0 | Status: DISCONTINUED | OUTPATIENT
Start: 2018-11-21 | End: 2018-11-26

## 2018-11-20 RX ORDER — CEFTRIAXONE 500 MG/1
INJECTION, POWDER, FOR SOLUTION INTRAMUSCULAR; INTRAVENOUS
Qty: 0 | Refills: 0 | Status: DISCONTINUED | OUTPATIENT
Start: 2018-11-20 | End: 2018-11-20

## 2018-11-20 RX ORDER — METFORMIN HYDROCHLORIDE 850 MG/1
1 TABLET ORAL
Qty: 0 | Refills: 0 | COMMUNITY

## 2018-11-20 RX ORDER — DEXTROSE 50 % IN WATER 50 %
12.5 SYRINGE (ML) INTRAVENOUS ONCE
Qty: 0 | Refills: 0 | Status: DISCONTINUED | OUTPATIENT
Start: 2018-11-20 | End: 2018-11-27

## 2018-11-20 RX ORDER — INFLUENZA VIRUS VACCINE 15; 15; 15; 15 UG/.5ML; UG/.5ML; UG/.5ML; UG/.5ML
0.5 SUSPENSION INTRAMUSCULAR ONCE
Qty: 0 | Refills: 0 | Status: COMPLETED | OUTPATIENT
Start: 2018-11-20 | End: 2018-11-27

## 2018-11-20 RX ORDER — ALBUTEROL 90 UG/1
1 AEROSOL, METERED ORAL EVERY 6 HOURS
Qty: 0 | Refills: 0 | Status: DISCONTINUED | OUTPATIENT
Start: 2018-11-20 | End: 2018-11-22

## 2018-11-20 RX ORDER — AMLODIPINE BESYLATE 2.5 MG/1
5 TABLET ORAL DAILY
Qty: 0 | Refills: 0 | Status: DISCONTINUED | OUTPATIENT
Start: 2018-11-20 | End: 2018-11-27

## 2018-11-20 RX ORDER — AZITHROMYCIN 500 MG/1
500 TABLET, FILM COATED ORAL ONCE
Qty: 0 | Refills: 0 | Status: DISCONTINUED | OUTPATIENT
Start: 2018-11-20 | End: 2018-11-20

## 2018-11-20 RX ORDER — GLUCAGON INJECTION, SOLUTION 0.5 MG/.1ML
1 INJECTION, SOLUTION SUBCUTANEOUS ONCE
Qty: 0 | Refills: 0 | Status: DISCONTINUED | OUTPATIENT
Start: 2018-11-20 | End: 2018-11-27

## 2018-11-20 RX ORDER — SODIUM CHLORIDE 9 MG/ML
1000 INJECTION, SOLUTION INTRAVENOUS
Qty: 0 | Refills: 0 | Status: DISCONTINUED | OUTPATIENT
Start: 2018-11-20 | End: 2018-11-27

## 2018-11-20 RX ADMIN — Medication 3 MILLILITER(S): at 17:10

## 2018-11-20 RX ADMIN — Medication 40 MILLIGRAM(S): at 21:12

## 2018-11-20 RX ADMIN — Medication 3 MILLILITER(S): at 12:47

## 2018-11-20 RX ADMIN — CEFTRIAXONE 100 GRAM(S): 500 INJECTION, POWDER, FOR SOLUTION INTRAMUSCULAR; INTRAVENOUS at 01:38

## 2018-11-20 RX ADMIN — Medication 40 MILLIGRAM(S): at 13:36

## 2018-11-20 RX ADMIN — Medication 1200 MILLIGRAM(S): at 17:35

## 2018-11-20 RX ADMIN — AMLODIPINE BESYLATE 5 MILLIGRAM(S): 2.5 TABLET ORAL at 12:47

## 2018-11-20 RX ADMIN — AZITHROMYCIN 500 MILLIGRAM(S): 500 TABLET, FILM COATED ORAL at 00:40

## 2018-11-20 RX ADMIN — AZITHROMYCIN 250 MILLIGRAM(S): 500 TABLET, FILM COATED ORAL at 23:42

## 2018-11-20 RX ADMIN — CEFTRIAXONE 100 GRAM(S): 500 INJECTION, POWDER, FOR SOLUTION INTRAMUSCULAR; INTRAVENOUS at 23:37

## 2018-11-20 RX ADMIN — BUDESONIDE AND FORMOTEROL FUMARATE DIHYDRATE 2 PUFF(S): 160; 4.5 AEROSOL RESPIRATORY (INHALATION) at 17:07

## 2018-11-20 RX ADMIN — ENOXAPARIN SODIUM 40 MILLIGRAM(S): 100 INJECTION SUBCUTANEOUS at 12:47

## 2018-11-20 RX ADMIN — Medication 1: at 17:35

## 2018-11-20 RX ADMIN — Medication 5: at 12:47

## 2018-11-20 RX ADMIN — Medication 3 MILLILITER(S): at 06:32

## 2018-11-20 RX ADMIN — AZITHROMYCIN 250 MILLIGRAM(S): 500 TABLET, FILM COATED ORAL at 00:19

## 2018-11-20 RX ADMIN — ALBUTEROL 1 PUFF(S): 90 AEROSOL, METERED ORAL at 13:36

## 2018-11-20 RX ADMIN — MONTELUKAST 10 MILLIGRAM(S): 4 TABLET, CHEWABLE ORAL at 21:12

## 2018-11-20 RX ADMIN — ALBUTEROL 1 PUFF(S): 90 AEROSOL, METERED ORAL at 17:07

## 2018-11-20 RX ADMIN — Medication 100 MILLIGRAM(S): at 17:07

## 2018-11-20 RX ADMIN — Medication 3 MILLILITER(S): at 23:37

## 2018-11-20 NOTE — H&P ADULT - NSHPREVIEWOFSYSTEMS_GEN_ALL_CORE
GENERAL: + fevers, + chills.  EYES: No blurry vision,  No photophobia  ENT: No sore throat.  No dysphagia  Cardiovascular: No chest pain, palpitations, orthopnea  Pulmonary: + cough, + wheezing. + shortness of breath  Gastrointestinal: No abdominal pain, no diarrhea, no constipation.   : No dysuria.  No hematuria  Musculoskeletal: No weakness.  + myalgias.  Dermatology:  No rashes.  Neuro: No Headache.  No vertigo.  No dizziness.

## 2018-11-20 NOTE — H&P ADULT - ASSESSMENT
Patient is a 68 year old male with HTN, DM2, HLD and asthma presents sent in from PMDs office given fevers and worsening shortness of breath x 10 days. Patient remarks that he has had shortness of breath, productive cough, wheezing, fevers (tmax at home of 102), chills and myalgias admitted for acute asthma exacerbation 2' acute viral infection with superimposed bacterial community acquired pneumonia.

## 2018-11-20 NOTE — H&P ADULT - PMH
COPD (chronic obstructive pulmonary disease)    DM (diabetes mellitus)    HLD (hyperlipidemia)    HTN (hypertension)

## 2018-11-20 NOTE — H&P ADULT - NSHPLABSRESULTS_GEN_ALL_CORE
.  LABS:                         11.4   10.3  )-----------( 347      ( 20 Nov 2018 11:08 )             34.8     11-19    136  |  97  |  11  ----------------------------<  204<H>  3.8   |  23  |  0.89    Ca    9.4      19 Nov 2018 18:48    TPro  7.5  /  Alb  3.9  /  TBili  0.3  /  DBili  x   /  AST  10  /  ALT  11  /  AlkPhos  61  11-19              RADIOLOGY, EKG & ADDITIONAL TESTS: Reviewed.     ct chest--> CT chest ground glass and tree-in-bud opacities  cxr--> unremarkable

## 2018-11-20 NOTE — H&P ADULT - HISTORY OF PRESENT ILLNESS
Patient is a 68 year old male with HTN, DM2, HLD and asthma presents sent in from PMDs office given fevers and worsening shortness of breath x 10 days. Patient remarks that he has had shortness of breath, productive cough, wheezing, fevers (tmax at home of 102), chills and myalgias. He denies chest pain or palpitations. Denies pain/burning with urination, no diarrhea, no rashes. Denies sick contacts or recent travel. Prior to the last ten days of these symptoms, patient had completed a two week course of prednisone because of his wheezing. He completed his prednisone about 10 days ago, however his wheezing and shortness of breath persisted and he subsequently developed fevers and chills. When he presented to this PMDs office today, per patient, he had a high fever, fast heart rate and low oxygen saturation.     In the ED, tmax of 99.1, hr 100, saturating at 95% on room air. CXR clear however CT chest groundglass and tree-in-bud opacities. Given azithromax and ceftriaxone x 1. RVP positive for enterovirus/rhinovirus.

## 2018-11-20 NOTE — H&P ADULT - PROBLEM SELECTOR PLAN 4
- glucose controlled  - fs achs  - start sliding scale insulin  - hold metformin   - start carbohydrate controlled diet

## 2018-11-20 NOTE — H&P ADULT - PROBLEM SELECTOR PLAN 1
- patient with acute asthma exacerbation 2' viral respiratory tract infection with a superimposed bacterial pneumonia  - s/p ceftriaxone 1 gm once and azithromax 500 mg once, c/w ceftriaxone 1 gm IV dialy and azithromax 500 mg daily  - blood cultures x 2 ordered  - RVP positive for rhinovirus/enterovirus, CT chest suspicious for underlying pneumonia   - start solumedrol 40 mg q8h, taper clinically  - start duonebs q6h prn

## 2018-11-20 NOTE — H&P ADULT - NSHPRISKHEPCSCREEN_GEN_A_CORE
"Chief Complaint   Patient presents with     RECHECK     Seizures       Initial BP 93/72 (BP Location: Left arm, Patient Position: Supine, Cuff Size: Infant)  Pulse 146  Ht 2' 0.02\" (61 cm)  Wt 11 lb 7.4 oz (5.2 kg)  HC 39.5 cm (15.55\")  BMI 13.97 kg/m2 Estimated body mass index is 13.97 kg/(m^2) as calculated from the following:    Height as of this encounter: 2' 0.02\" (61 cm).    Weight as of this encounter: 11 lb 7.4 oz (5.2 kg).  Medication Reconciliation: complete    " Offered and patient declined

## 2018-11-21 LAB
ANION GAP SERPL CALC-SCNC: 16 MMOL/L — SIGNIFICANT CHANGE UP (ref 5–17)
BASOPHILS # BLD AUTO: 0 K/UL — SIGNIFICANT CHANGE UP (ref 0–0.2)
BASOPHILS NFR BLD AUTO: 0 % — SIGNIFICANT CHANGE UP (ref 0–2)
BUN SERPL-MCNC: 19 MG/DL — SIGNIFICANT CHANGE UP (ref 7–23)
CALCIUM SERPL-MCNC: 9.7 MG/DL — SIGNIFICANT CHANGE UP (ref 8.4–10.5)
CHLORIDE SERPL-SCNC: 97 MMOL/L — SIGNIFICANT CHANGE UP (ref 96–108)
CO2 SERPL-SCNC: 24 MMOL/L — SIGNIFICANT CHANGE UP (ref 22–31)
CREAT SERPL-MCNC: 0.89 MG/DL — SIGNIFICANT CHANGE UP (ref 0.5–1.3)
EOSINOPHIL # BLD AUTO: 0 K/UL — SIGNIFICANT CHANGE UP (ref 0–0.5)
EOSINOPHIL NFR BLD AUTO: 0.1 % — SIGNIFICANT CHANGE UP (ref 0–6)
GLUCOSE BLDC GLUCOMTR-MCNC: 298 MG/DL — HIGH (ref 70–99)
GLUCOSE BLDC GLUCOMTR-MCNC: 316 MG/DL — HIGH (ref 70–99)
GLUCOSE BLDC GLUCOMTR-MCNC: 326 MG/DL — HIGH (ref 70–99)
GLUCOSE BLDC GLUCOMTR-MCNC: 399 MG/DL — HIGH (ref 70–99)
GLUCOSE SERPL-MCNC: 306 MG/DL — HIGH (ref 70–99)
HBA1C BLD-MCNC: 7.4 % — HIGH (ref 4–5.6)
HCT VFR BLD CALC: 37.1 % — LOW (ref 39–50)
HGB BLD-MCNC: 12.2 G/DL — LOW (ref 13–17)
LYMPHOCYTES # BLD AUTO: 1 K/UL — SIGNIFICANT CHANGE UP (ref 1–3.3)
LYMPHOCYTES # BLD AUTO: 8.1 % — LOW (ref 13–44)
MCHC RBC-ENTMCNC: 26.2 PG — LOW (ref 27–34)
MCHC RBC-ENTMCNC: 32.8 GM/DL — SIGNIFICANT CHANGE UP (ref 32–36)
MCV RBC AUTO: 79.7 FL — LOW (ref 80–100)
MONOCYTES # BLD AUTO: 0.2 K/UL — SIGNIFICANT CHANGE UP (ref 0–0.9)
MONOCYTES NFR BLD AUTO: 1.9 % — LOW (ref 2–14)
NEUTROPHILS # BLD AUTO: 10.9 K/UL — HIGH (ref 1.8–7.4)
NEUTROPHILS NFR BLD AUTO: 89.8 % — HIGH (ref 43–77)
PLATELET # BLD AUTO: 360 K/UL — SIGNIFICANT CHANGE UP (ref 150–400)
POTASSIUM SERPL-MCNC: 4.3 MMOL/L — SIGNIFICANT CHANGE UP (ref 3.5–5.3)
POTASSIUM SERPL-SCNC: 4.3 MMOL/L — SIGNIFICANT CHANGE UP (ref 3.5–5.3)
RBC # BLD: 4.65 M/UL — SIGNIFICANT CHANGE UP (ref 4.2–5.8)
RBC # FLD: 12.7 % — SIGNIFICANT CHANGE UP (ref 10.3–14.5)
SODIUM SERPL-SCNC: 137 MMOL/L — SIGNIFICANT CHANGE UP (ref 135–145)
WBC # BLD: 12.1 K/UL — HIGH (ref 3.8–10.5)
WBC # FLD AUTO: 12.1 K/UL — HIGH (ref 3.8–10.5)

## 2018-11-21 RX ORDER — INSULIN LISPRO 100/ML
VIAL (ML) SUBCUTANEOUS AT BEDTIME
Qty: 0 | Refills: 0 | Status: DISCONTINUED | OUTPATIENT
Start: 2018-11-21 | End: 2018-11-23

## 2018-11-21 RX ADMIN — Medication 3: at 08:24

## 2018-11-21 RX ADMIN — ALBUTEROL 1 PUFF(S): 90 AEROSOL, METERED ORAL at 16:33

## 2018-11-21 RX ADMIN — Medication 3 MILLILITER(S): at 23:28

## 2018-11-21 RX ADMIN — AMLODIPINE BESYLATE 5 MILLIGRAM(S): 2.5 TABLET ORAL at 05:06

## 2018-11-21 RX ADMIN — ALBUTEROL 1 PUFF(S): 90 AEROSOL, METERED ORAL at 11:25

## 2018-11-21 RX ADMIN — Medication 100 MILLIGRAM(S): at 05:06

## 2018-11-21 RX ADMIN — Medication 100 MILLIGRAM(S): at 20:28

## 2018-11-21 RX ADMIN — Medication 3 MILLILITER(S): at 16:30

## 2018-11-21 RX ADMIN — CEFTRIAXONE 100 GRAM(S): 500 INJECTION, POWDER, FOR SOLUTION INTRAMUSCULAR; INTRAVENOUS at 23:29

## 2018-11-21 RX ADMIN — Medication 2: at 21:46

## 2018-11-21 RX ADMIN — BUDESONIDE AND FORMOTEROL FUMARATE DIHYDRATE 2 PUFF(S): 160; 4.5 AEROSOL RESPIRATORY (INHALATION) at 16:32

## 2018-11-21 RX ADMIN — Medication 3 MILLILITER(S): at 16:33

## 2018-11-21 RX ADMIN — ENOXAPARIN SODIUM 40 MILLIGRAM(S): 100 INJECTION SUBCUTANEOUS at 11:25

## 2018-11-21 RX ADMIN — Medication 4: at 17:54

## 2018-11-21 RX ADMIN — Medication 3 MILLILITER(S): at 05:06

## 2018-11-21 RX ADMIN — Medication 1200 MILLIGRAM(S): at 05:06

## 2018-11-21 RX ADMIN — Medication 3 MILLILITER(S): at 11:25

## 2018-11-21 RX ADMIN — Medication 20 MILLIGRAM(S): at 23:28

## 2018-11-21 RX ADMIN — ALBUTEROL 1 PUFF(S): 90 AEROSOL, METERED ORAL at 23:32

## 2018-11-21 RX ADMIN — Medication 5: at 13:04

## 2018-11-21 RX ADMIN — Medication 40 MILLIGRAM(S): at 05:07

## 2018-11-21 RX ADMIN — Medication 20 MILLIGRAM(S): at 16:31

## 2018-11-21 RX ADMIN — AZITHROMYCIN 250 MILLIGRAM(S): 500 TABLET, FILM COATED ORAL at 23:31

## 2018-11-21 RX ADMIN — BUDESONIDE AND FORMOTEROL FUMARATE DIHYDRATE 2 PUFF(S): 160; 4.5 AEROSOL RESPIRATORY (INHALATION) at 05:05

## 2018-11-21 RX ADMIN — ALBUTEROL 1 PUFF(S): 90 AEROSOL, METERED ORAL at 05:07

## 2018-11-21 RX ADMIN — Medication 40 MILLIGRAM(S): at 13:04

## 2018-11-21 RX ADMIN — Medication 1200 MILLIGRAM(S): at 17:55

## 2018-11-21 RX ADMIN — MONTELUKAST 10 MILLIGRAM(S): 4 TABLET, CHEWABLE ORAL at 21:45

## 2018-11-21 RX ADMIN — Medication 3 MILLILITER(S): at 23:27

## 2018-11-21 NOTE — CONSULT NOTE ADULT - SUBJECTIVE AND OBJECTIVE BOX
Patient is a 68y old  Male who presents with a chief complaint of shortness of breath (20 Nov 2018 11:12)      HPI:  Patient is a 68 year old male with HTN, DM2, HLD and asthma presents sent in from PMDs office given fevers and worsening shortness of breath x 10 days. Patient remarks that he has had shortness of breath, productive cough, wheezing, fevers (tmax at home of 102), chills and myalgias. He denies chest pain or palpitations. Denies pain/burning with urination, no diarrhea, no rashes. Denies sick contacts or recent travel. Prior to the last ten days of these symptoms, patient had completed a two week course of prednisone because of his wheezing. He completed his prednisone about 10 days ago, however his wheezing and shortness of breath persisted and he subsequently developed fevers and chills. When he presented to this PMDs office today, per patient, he had a high fever, fast heart rate and low oxygen saturation.     In the ED, tmax of 99.1, hr 100, saturating at 95% on room air. CXR clear however CT chest groundglass and tree-in-bud opacities. Given azithromax and ceftriaxone x 1. RVP positive for enterovirus/rhinovirus. (20 Nov 2018 11:12)    pt dsays he has been very sick for last two weeks: He was prescribed prednisone by his PMD about sometime ago and h took it for 4 weeks: When he was on prednisone he felt fine but after stopping his breathing and wheezing got worse:  Curently he has been wheezing alot: When he was in [place] he used to visit pulmonologist every two weeks: He has allergies to dust:     i?FOLLOWING PRESENT  [x ] Hx of PE/DVT, [ x] Hx COPD, [y ] Hx of Asthma, [xy] Hx of Hospitalization, [ x]  Hx of BiPAP/CPAP use, [ x] Hx of MORIAH    Allergies    No Known Allergies    Intolerances        PAST MEDICAL & SURGICAL HISTORY:  COPD (chronic obstructive pulmonary disease)  HLD (hyperlipidemia)  DM (diabetes mellitus)  HTN (hypertension)  No significant past surgical history      FAMILY HISTORY:  Family history of hypertension (Father)      Social History: [x ] TOBACCO                  [  x] ETOH                                 [ x ] IVDA/DRUGS    REVIEW OF SYSTEMS      General: low grade temperature	    Skin/Breast:x  	  Ophthalmologic:s  	  ENMT:	x    Respiratory and Thorax: SOB, WHEEZING++  	  Cardiovascular:	X    Gastrointestinal:	X    Genitourinary:	X    Musculoskeletal:	X    Neurological:	X    Psychiatric:	X    Hematology/Lymphatics:	X    Endocrine:	X    Allergic/Immunologic:	X    MEDICATIONS  (STANDING):  ALBUTerol    90 MICROgram(s) HFA Inhaler 1 Puff(s) Inhalation every 6 hours  ALBUTerol/ipratropium for Nebulization 3 milliLiter(s) Nebulizer every 6 hours  amLODIPine   Tablet 5 milliGRAM(s) Oral daily  azithromycin  IVPB 500 milliGRAM(s) IV Intermittent every 24 hours  buDESOnide 160 MICROgram(s)/formoterol 4.5 MICROgram(s) Inhaler 2 Puff(s) Inhalation two times a day  cefTRIAXone   IVPB 1 Gram(s) IV Intermittent every 24 hours  dextrose 5%. 1000 milliLiter(s) (50 mL/Hr) IV Continuous <Continuous>  dextrose 50% Injectable 12.5 Gram(s) IV Push once  enoxaparin Injectable 40 milliGRAM(s) SubCutaneous every 24 hours  guaiFENesin ER 1200 milliGRAM(s) Oral every 12 hours  influenza   Vaccine 0.5 milliLiter(s) IntraMuscular once  insulin lispro (HumaLOG) corrective regimen sliding scale   SubCutaneous three times a day before meals  methylPREDNISolone sodium succinate Injectable 20 milliGRAM(s) IV Push every 6 hours  montelukast 10 milliGRAM(s) Oral at bedtime    MEDICATIONS  (PRN):  ALBUTerol/ipratropium for Nebulization 3 milliLiter(s) Nebulizer every 6 hours PRN Bronchospasm  benzonatate 100 milliGRAM(s) Oral every 8 hours PRN Cough  dextrose 40% Gel 15 Gram(s) Oral once PRN Blood Glucose LESS THAN 70 milliGRAM(s)/deciliter  glucagon  Injectable 1 milliGRAM(s) IntraMuscular once PRN Glucose LESS THAN 70 milligrams/deciliter       Vital Signs Last 24 Hrs  T(C): 36.3 (21 Nov 2018 09:36), Max: 36.5 (20 Nov 2018 22:06)  T(F): 97.3 (21 Nov 2018 09:36), Max: 97.7 (20 Nov 2018 22:06)  HR: 95 (21 Nov 2018 09:36) (92 - 98)  BP: 118/73 (21 Nov 2018 09:36) (118/73 - 144/73)  BP(mean): --  RR: 18 (21 Nov 2018 09:36) (18 - 18)  SpO2: 94% (21 Nov 2018 09:36) (94% - 97%)        I&O's Summary    20 Nov 2018 07:01  -  21 Nov 2018 07:00  --------------------------------------------------------  IN: 250 mL / OUT: 500 mL / NET: -250 mL    21 Nov 2018 07:01  -  21 Nov 2018 13:13  --------------------------------------------------------  IN: 250 mL / OUT: 0 mL / NET: 250 mL        Physical Exam:   GENERAL: NAD, well-groomed, well-developed  HEENT: SEAN/   Atraumatic, Normocephalic  ENMT: No tonsillar erythema, exudates, or enlargement; Moist mucous membranes, Good dentition, No lesions  NECK: Supple, No JVD, Normal thyroid  CHEST/LUNG: EXTENSIVE WHEEZING++  CVS: Regular rate and rhythm; No murmurs, rubs, or gallops  GI: : Soft, Nontender, Nondistended; Bowel sounds present  NERVOUS SYSTEM:  Alert & Oriented X3, Good concentration; Motor Strength 5/5 B/L upper and lower extremities; DTRs 2+ intact and symmetric  EXTREMITIES:  2+ Peripheral Pulses, No clubbing, cyanosis, or edema  LYMPH: No lymphadenopathy noted  SKIN: No rashes or lesions  ENDOCRINOLOGY: No Thyromegaly  PSYCH: Appropriate    Labs:  Venous<42<4>><50<<7.425>>Venous<<3><<4><<5<<<509>>                            12.2   12.1  )-----------( 360      ( 21 Nov 2018 05:47 )             37.1                         11.4   10.3  )-----------( 347      ( 20 Nov 2018 11:08 )             34.8                         12.4   9.6   )-----------( 345      ( 19 Nov 2018 18:48 )             36.3     11-21    137  |  97  |  19  ----------------------------<  306<H>  4.3   |  24  |  0.89  11-20    135  |  98  |  18  ----------------------------<  349<H>  4.2   |  24  |  0.89  11-19    136  |  97  |  11  ----------------------------<  204<H>  3.8   |  23  |  0.89    Ca    9.7      21 Nov 2018 05:47  Ca    10.1      20 Nov 2018 11:08  Ca    9.4      19 Nov 2018 18:48    TPro  7.5  /  Alb  3.9  /  TBili  0.3  /  DBili  x   /  AST  10  /  ALT  11  /  AlkPhos  61  11-19    CAPILLARY BLOOD GLUCOSE      POCT Blood Glucose.: 399 mg/dL (21 Nov 2018 12:50)  POCT Blood Glucose.: 298 mg/dL (21 Nov 2018 08:16)  POCT Blood Glucose.: 211 mg/dL (20 Nov 2018 22:07)  POCT Blood Glucose.: 180 mg/dL (20 Nov 2018 17:21)    LIVER FUNCTIONS - ( 19 Nov 2018 18:48 )  Alb: 3.9 g/dL / Pro: 7.5 g/dL / ALK PHOS: 61 U/L / ALT: 11 U/L / AST: 10 U/L / GGT: x               D DImer      Studies  Chest X-RAY  CT SCAN Chest   CT Abdomen  Venous Dopplers: LE:   Others        < from: CT Chest No Cont (11.19.18 @ 21:10) >    EXAM:  CT CHEST                            PROCEDURE DATE:  11/19/2018            INTERPRETATION:  CLINICAL INFORMATION: Shortness of breath. Fever.   Evaluate for interstitial lung disease.    COMPARISON: Chest CT from 6/16/2018.    PROCEDURE:   CT of the Chest was performed without intravenous contrast.  Sagittal and coronal reformats were performed.      FINDINGS:    CHEST:     LUNGS AND LARGE AIRWAYS: Patent central airways.  There are patchy and   nodular groundglass opacities in the rightupper and lower lobes. There   are tree-in-bud opacities predominantly in the left lower lobe.  PLEURA: No pleural effusion.  VESSELS: Coronary artery calcifications.  HEART: Heart size is normal. No pericardial effusion.  MEDIASTINUM AND IBRAHIMA: No lymphadenopathy.  CHEST WALL AND LOWER NECK: Within normal limits.  VISUALIZED UPPER ABDOMEN: Within normal limits.  BONES: Within normal limits.    IMPRESSION: Groundglass and tree-in-bud opacities may be due to   infection. Recommend follow-up to resolution.      < end of copied text >

## 2018-11-21 NOTE — CONSULT NOTE ADULT - PROBLEM SELECTOR RECOMMENDATION 9
Rhinovirus induced as thma exacerbation with CT ascan with GGO in the lower lobes with TUB opacities: He likely has viral pneumonia and has been on empiric antibiotics: Would change iv steroids to 20 mg q 6 hours as well as cont BD q 6 hours as well as Symbicort as well as Singulair: He had stopped taking Symbicort secondary to his insurance issues: He did nto take it for a week prior to coming to hospital! HIs admission venous ph was 7.42: pretty good!!

## 2018-11-21 NOTE — PROGRESS NOTE ADULT - SUBJECTIVE AND OBJECTIVE BOX
Patient is a 68y old  Male who presents with a chief complaint of shortness of breath (21 Nov 2018 13:13)    pt. seen and examined, still wheezing     INTERVAL HPI/OVERNIGHT EVENTS:  T(C): 36.7 (11-21-18 @ 15:30), Max: 36.7 (11-21-18 @ 15:30)  HR: 103 (11-21-18 @ 15:30) (92 - 103)  BP: 147/74 (11-21-18 @ 15:30) (118/73 - 147/74)  RR: 18 (11-21-18 @ 15:30) (18 - 18)  SpO2: 98% (11-21-18 @ 15:30) (94% - 98%)  Wt(kg): --  I&O's Summary    20 Nov 2018 07:01  -  21 Nov 2018 07:00  --------------------------------------------------------  IN: 250 mL / OUT: 500 mL / NET: -250 mL    21 Nov 2018 07:01  -  21 Nov 2018 23:15  --------------------------------------------------------  IN: 490 mL / OUT: 0 mL / NET: 490 mL        PAST MEDICAL & SURGICAL HISTORY:  COPD (chronic obstructive pulmonary disease)  HLD (hyperlipidemia)  DM (diabetes mellitus)  HTN (hypertension)  No significant past surgical history      SOCIAL HISTORY  Alcohol:  Tobacco:  Illicit substance use:    FAMILY HISTORY:    REVIEW OF SYSTEMS:  CONSTITUTIONAL: No fever, weight loss, or fatigue  EYES: No eye pain, visual disturbances, or discharge  ENMT:  No difficulty hearing, tinnitus, vertigo; No sinus or throat pain  NECK: No pain or stiffness  RESPIRATORY: No cough, wheezing, chills or hemoptysis; No shortness of breath  CARDIOVASCULAR: No chest pain, palpitations, dizziness, or leg swelling  GASTROINTESTINAL: No abdominal or epigastric pain. No nausea, vomiting, or hematemesis; No diarrhea or constipation. No melena or hematochezia.  GENITOURINARY: No dysuria, frequency, hematuria, or incontinence  NEUROLOGICAL: No headaches, memory loss, loss of strength, numbness, or tremors  SKIN: No itching, burning, rashes, or lesions   LYMPH NODES: No enlarged glands  ENDOCRINE: No heat or cold intolerance; No hair loss  MUSCULOSKELETAL: No joint pain or swelling; No muscle, back, or extremity pain  PSYCHIATRIC: No depression, anxiety, mood swings, or difficulty sleeping  HEME/LYMPH: No easy bruising, or bleeding gums  ALLERY AND IMMUNOLOGIC: No hives or eczema    RADIOLOGY & ADDITIONAL TESTS:    Imaging Personally Reviewed:  [ ] YES  [ ] NO    Consultant(s) Notes Reviewed:  [ ] YES  [ ] NO    PHYSICAL EXAM:  GENERAL: NAD, well-groomed, well-developed  HEAD:  Atraumatic, Normocephalic  EYES: EOMI, PERRLA, conjunctiva and sclera clear  ENMT: No tonsillar erythema, exudates, or enlargement; Moist mucous membranes, Good dentition, No lesions  NECK: Supple, No JVD, Normal thyroid  NERVOUS SYSTEM:  Alert & Oriented X3, Good concentration; Motor Strength 5/5 B/L upper and lower extremities; DTRs 2+ intact and symmetric  CHEST/LUNG: Clear to percussion bilaterally; No rales, rhonchi, wheezing, or rubs  HEART: Regular rate and rhythm; No murmurs, rubs, or gallops  ABDOMEN: Soft, Nontender, Nondistended; Bowel sounds present  EXTREMITIES:  2+ Peripheral Pulses, No clubbing, cyanosis, or edema  LYMPH: No lymphadenopathy noted  SKIN: No rashes or lesions    LABS:                        12.2   12.1  )-----------( 360      ( 21 Nov 2018 05:47 )             37.1     11-21    137  |  97  |  19  ----------------------------<  306<H>  4.3   |  24  |  0.89    Ca    9.7      21 Nov 2018 05:47          CAPILLARY BLOOD GLUCOSE      POCT Blood Glucose.: 326 mg/dL (21 Nov 2018 21:27)  POCT Blood Glucose.: 316 mg/dL (21 Nov 2018 17:09)  POCT Blood Glucose.: 399 mg/dL (21 Nov 2018 12:50)  POCT Blood Glucose.: 298 mg/dL (21 Nov 2018 08:16)            MEDICATIONS  (STANDING):  ALBUTerol    90 MICROgram(s) HFA Inhaler 1 Puff(s) Inhalation every 6 hours  ALBUTerol/ipratropium for Nebulization 3 milliLiter(s) Nebulizer every 6 hours  amLODIPine   Tablet 5 milliGRAM(s) Oral daily  azithromycin  IVPB 500 milliGRAM(s) IV Intermittent every 24 hours  buDESOnide 160 MICROgram(s)/formoterol 4.5 MICROgram(s) Inhaler 2 Puff(s) Inhalation two times a day  cefTRIAXone   IVPB 1 Gram(s) IV Intermittent every 24 hours  dextrose 5%. 1000 milliLiter(s) (50 mL/Hr) IV Continuous <Continuous>  dextrose 50% Injectable 12.5 Gram(s) IV Push once  enoxaparin Injectable 40 milliGRAM(s) SubCutaneous every 24 hours  guaiFENesin ER 1200 milliGRAM(s) Oral every 12 hours  influenza   Vaccine 0.5 milliLiter(s) IntraMuscular once  insulin lispro (HumaLOG) corrective regimen sliding scale   SubCutaneous at bedtime  insulin lispro (HumaLOG) corrective regimen sliding scale   SubCutaneous three times a day before meals  methylPREDNISolone sodium succinate Injectable 20 milliGRAM(s) IV Push every 6 hours  montelukast 10 milliGRAM(s) Oral at bedtime    MEDICATIONS  (PRN):  ALBUTerol/ipratropium for Nebulization 3 milliLiter(s) Nebulizer every 6 hours PRN Bronchospasm  benzonatate 100 milliGRAM(s) Oral every 8 hours PRN Cough  dextrose 40% Gel 15 Gram(s) Oral once PRN Blood Glucose LESS THAN 70 milliGRAM(s)/deciliter  glucagon  Injectable 1 milliGRAM(s) IntraMuscular once PRN Glucose LESS THAN 70 milligrams/deciliter  guaiFENesin   Syrup  (Sugar-Free) 100 milliGRAM(s) Oral every 6 hours PRN Cough      Care Discussed with Consultants/Other Providers [ ] YES  [ ] NO

## 2018-11-22 DIAGNOSIS — J18.9 PNEUMONIA, UNSPECIFIED ORGANISM: ICD-10-CM

## 2018-11-22 LAB
GLUCOSE BLDC GLUCOMTR-MCNC: 323 MG/DL — HIGH (ref 70–99)
GLUCOSE BLDC GLUCOMTR-MCNC: 338 MG/DL — HIGH (ref 70–99)
GLUCOSE BLDC GLUCOMTR-MCNC: 365 MG/DL — HIGH (ref 70–99)
GLUCOSE BLDC GLUCOMTR-MCNC: 449 MG/DL — HIGH (ref 70–99)

## 2018-11-22 RX ORDER — IPRATROPIUM/ALBUTEROL SULFATE 18-103MCG
3 AEROSOL WITH ADAPTER (GRAM) INHALATION EVERY 6 HOURS
Qty: 0 | Refills: 0 | Status: DISCONTINUED | OUTPATIENT
Start: 2018-11-22 | End: 2018-11-22

## 2018-11-22 RX ADMIN — CEFTRIAXONE 100 GRAM(S): 500 INJECTION, POWDER, FOR SOLUTION INTRAMUSCULAR; INTRAVENOUS at 23:00

## 2018-11-22 RX ADMIN — Medication 5: at 08:37

## 2018-11-22 RX ADMIN — ALBUTEROL 1 PUFF(S): 90 AEROSOL, METERED ORAL at 11:31

## 2018-11-22 RX ADMIN — AMLODIPINE BESYLATE 5 MILLIGRAM(S): 2.5 TABLET ORAL at 06:09

## 2018-11-22 RX ADMIN — Medication 3 MILLILITER(S): at 06:08

## 2018-11-22 RX ADMIN — ENOXAPARIN SODIUM 40 MILLIGRAM(S): 100 INJECTION SUBCUTANEOUS at 11:31

## 2018-11-22 RX ADMIN — Medication 3 MILLILITER(S): at 23:04

## 2018-11-22 RX ADMIN — BUDESONIDE AND FORMOTEROL FUMARATE DIHYDRATE 2 PUFF(S): 160; 4.5 AEROSOL RESPIRATORY (INHALATION) at 17:36

## 2018-11-22 RX ADMIN — Medication 20 MILLIGRAM(S): at 23:04

## 2018-11-22 RX ADMIN — ALBUTEROL 1 PUFF(S): 90 AEROSOL, METERED ORAL at 17:36

## 2018-11-22 RX ADMIN — BUDESONIDE AND FORMOTEROL FUMARATE DIHYDRATE 2 PUFF(S): 160; 4.5 AEROSOL RESPIRATORY (INHALATION) at 06:10

## 2018-11-22 RX ADMIN — Medication 1200 MILLIGRAM(S): at 17:36

## 2018-11-22 RX ADMIN — AZITHROMYCIN 250 MILLIGRAM(S): 500 TABLET, FILM COATED ORAL at 23:54

## 2018-11-22 RX ADMIN — Medication 100 MILLIGRAM(S): at 23:55

## 2018-11-22 RX ADMIN — Medication 3 MILLILITER(S): at 17:37

## 2018-11-22 RX ADMIN — Medication 20 MILLIGRAM(S): at 11:29

## 2018-11-22 RX ADMIN — Medication 4: at 17:38

## 2018-11-22 RX ADMIN — Medication 3 MILLILITER(S): at 11:29

## 2018-11-22 RX ADMIN — Medication 2: at 23:02

## 2018-11-22 RX ADMIN — Medication 20 MILLIGRAM(S): at 06:10

## 2018-11-22 RX ADMIN — Medication 20 MILLIGRAM(S): at 17:36

## 2018-11-22 RX ADMIN — Medication 1200 MILLIGRAM(S): at 06:09

## 2018-11-22 RX ADMIN — MONTELUKAST 10 MILLIGRAM(S): 4 TABLET, CHEWABLE ORAL at 22:19

## 2018-11-22 RX ADMIN — ALBUTEROL 1 PUFF(S): 90 AEROSOL, METERED ORAL at 06:08

## 2018-11-22 RX ADMIN — Medication 6: at 12:37

## 2018-11-22 NOTE — PROGRESS NOTE ADULT - SUBJECTIVE AND OBJECTIVE BOX
Patient is a 68y old  Male who presents with a chief complaint of shortness of breath (22 Nov 2018 09:42)    feeling better, wheezing decrease    INTERVAL HPI/OVERNIGHT EVENTS:  T(C): 36.6 (11-22-18 @ 18:16), Max: 36.7 (11-22-18 @ 00:31)  HR: 99 (11-22-18 @ 18:16) (93 - 99)  BP: 131/70 (11-22-18 @ 18:16) (112/67 - 131/70)  RR: 20 (11-22-18 @ 18:16) (16 - 20)  SpO2: 93% (11-22-18 @ 18:16) (93% - 94%)  Wt(kg): --  I&O's Summary    21 Nov 2018 07:01  -  22 Nov 2018 07:00  --------------------------------------------------------  IN: 730 mL / OUT: 0 mL / NET: 730 mL    22 Nov 2018 07:01  -  22 Nov 2018 20:20  --------------------------------------------------------  IN: 1070 mL / OUT: 0 mL / NET: 1070 mL        PAST MEDICAL & SURGICAL HISTORY:  COPD (chronic obstructive pulmonary disease)  HLD (hyperlipidemia)  DM (diabetes mellitus)  HTN (hypertension)  No significant past surgical history      SOCIAL HISTORY  Alcohol:  Tobacco:  Illicit substance use:    FAMILY HISTORY:    REVIEW OF SYSTEMS:  CONSTITUTIONAL: No fever, weight loss, or fatigue  EYES: No eye pain, visual disturbances, or discharge  ENMT:  No difficulty hearing, tinnitus, vertigo; No sinus or throat pain  NECK: No pain or stiffness  RESPIRATORY: No cough, wheezing, chills or hemoptysis; No shortness of breath  CARDIOVASCULAR: No chest pain, palpitations, dizziness, or leg swelling  GASTROINTESTINAL: No abdominal or epigastric pain. No nausea, vomiting, or hematemesis; No diarrhea or constipation. No melena or hematochezia.  GENITOURINARY: No dysuria, frequency, hematuria, or incontinence  NEUROLOGICAL: No headaches, memory loss, loss of strength, numbness, or tremors  SKIN: No itching, burning, rashes, or lesions   LYMPH NODES: No enlarged glands  ENDOCRINE: No heat or cold intolerance; No hair loss  MUSCULOSKELETAL: No joint pain or swelling; No muscle, back, or extremity pain  PSYCHIATRIC: No depression, anxiety, mood swings, or difficulty sleeping  HEME/LYMPH: No easy bruising, or bleeding gums  ALLERY AND IMMUNOLOGIC: No hives or eczema    RADIOLOGY & ADDITIONAL TESTS:    Imaging Personally Reviewed:  [ ] YES  [ ] NO    Consultant(s) Notes Reviewed:  [ ] YES  [ ] NO    PHYSICAL EXAM:  GENERAL: NAD, well-groomed, well-developed  HEAD:  Atraumatic, Normocephalic  EYES: EOMI, PERRLA, conjunctiva and sclera clear  ENMT: No tonsillar erythema, exudates, or enlargement; Moist mucous membranes, Good dentition, No lesions  NECK: Supple, No JVD, Normal thyroid  NERVOUS SYSTEM:  Alert & Oriented X3, Good concentration; Motor Strength 5/5 B/L upper and lower extremities; DTRs 2+ intact and symmetric  CHEST/LUNG: Clear to percussion bilaterally; No rales, rhonchi, wheezing, or rubs  HEART: Regular rate and rhythm; No murmurs, rubs, or gallops  ABDOMEN: Soft, Nontender, Nondistended; Bowel sounds present  EXTREMITIES:  2+ Peripheral Pulses, No clubbing, cyanosis, or edema  LYMPH: No lymphadenopathy noted  SKIN: No rashes or lesions    LABS:                        12.2   12.1  )-----------( 360      ( 21 Nov 2018 05:47 )             37.1     11-21    137  |  97  |  19  ----------------------------<  306<H>  4.3   |  24  |  0.89    Ca    9.7      21 Nov 2018 05:47          CAPILLARY BLOOD GLUCOSE      POCT Blood Glucose.: 338 mg/dL (22 Nov 2018 17:17)  POCT Blood Glucose.: 449 mg/dL (22 Nov 2018 12:00)  POCT Blood Glucose.: 365 mg/dL (22 Nov 2018 08:13)  POCT Blood Glucose.: 326 mg/dL (21 Nov 2018 21:27)            MEDICATIONS  (STANDING):  ALBUTerol/ipratropium for Nebulization 3 milliLiter(s) Nebulizer every 6 hours  amLODIPine   Tablet 5 milliGRAM(s) Oral daily  azithromycin  IVPB 500 milliGRAM(s) IV Intermittent every 24 hours  buDESOnide 160 MICROgram(s)/formoterol 4.5 MICROgram(s) Inhaler 2 Puff(s) Inhalation two times a day  cefTRIAXone   IVPB 1 Gram(s) IV Intermittent every 24 hours  dextrose 5%. 1000 milliLiter(s) (50 mL/Hr) IV Continuous <Continuous>  dextrose 50% Injectable 12.5 Gram(s) IV Push once  enoxaparin Injectable 40 milliGRAM(s) SubCutaneous every 24 hours  guaiFENesin ER 1200 milliGRAM(s) Oral every 12 hours  influenza   Vaccine 0.5 milliLiter(s) IntraMuscular once  insulin lispro (HumaLOG) corrective regimen sliding scale   SubCutaneous at bedtime  insulin lispro (HumaLOG) corrective regimen sliding scale   SubCutaneous three times a day before meals  methylPREDNISolone sodium succinate Injectable 20 milliGRAM(s) IV Push every 6 hours  montelukast 10 milliGRAM(s) Oral at bedtime    MEDICATIONS  (PRN):  benzonatate 100 milliGRAM(s) Oral every 8 hours PRN Cough  dextrose 40% Gel 15 Gram(s) Oral once PRN Blood Glucose LESS THAN 70 milliGRAM(s)/deciliter  glucagon  Injectable 1 milliGRAM(s) IntraMuscular once PRN Glucose LESS THAN 70 milligrams/deciliter  guaiFENesin   Syrup  (Sugar-Free) 100 milliGRAM(s) Oral every 6 hours PRN Cough  HYDROcodone/homatropine Syrup 5 milliLiter(s) Oral every 6 hours PRN mild pain      Care Discussed with Consultants/Other Providers [ ] YES  [ ] NO

## 2018-11-22 NOTE — PROGRESS NOTE ADULT - SUBJECTIVE AND OBJECTIVE BOX
Patient is a 68y old  Male who presents with a chief complaint of shortness of breath (21 Nov 2018 23:15)      Any change in ROS: Still wheezing considerably     MEDICATIONS  (STANDING):  ALBUTerol    90 MICROgram(s) HFA Inhaler 1 Puff(s) Inhalation every 6 hours  ALBUTerol/ipratropium for Nebulization 3 milliLiter(s) Nebulizer every 6 hours  amLODIPine   Tablet 5 milliGRAM(s) Oral daily  azithromycin  IVPB 500 milliGRAM(s) IV Intermittent every 24 hours  buDESOnide 160 MICROgram(s)/formoterol 4.5 MICROgram(s) Inhaler 2 Puff(s) Inhalation two times a day  cefTRIAXone   IVPB 1 Gram(s) IV Intermittent every 24 hours  dextrose 5%. 1000 milliLiter(s) (50 mL/Hr) IV Continuous <Continuous>  dextrose 50% Injectable 12.5 Gram(s) IV Push once  enoxaparin Injectable 40 milliGRAM(s) SubCutaneous every 24 hours  guaiFENesin ER 1200 milliGRAM(s) Oral every 12 hours  influenza   Vaccine 0.5 milliLiter(s) IntraMuscular once  insulin lispro (HumaLOG) corrective regimen sliding scale   SubCutaneous at bedtime  insulin lispro (HumaLOG) corrective regimen sliding scale   SubCutaneous three times a day before meals  methylPREDNISolone sodium succinate Injectable 20 milliGRAM(s) IV Push every 6 hours  montelukast 10 milliGRAM(s) Oral at bedtime    MEDICATIONS  (PRN):  ALBUTerol/ipratropium for Nebulization 3 milliLiter(s) Nebulizer every 6 hours PRN Bronchospasm  benzonatate 100 milliGRAM(s) Oral every 8 hours PRN Cough  dextrose 40% Gel 15 Gram(s) Oral once PRN Blood Glucose LESS THAN 70 milliGRAM(s)/deciliter  glucagon  Injectable 1 milliGRAM(s) IntraMuscular once PRN Glucose LESS THAN 70 milligrams/deciliter  guaiFENesin   Syrup  (Sugar-Free) 100 milliGRAM(s) Oral every 6 hours PRN Cough    Vital Signs Last 24 Hrs  T(C): 36.7 (22 Nov 2018 08:20), Max: 36.7 (21 Nov 2018 15:30)  T(F): 98 (22 Nov 2018 08:20), Max: 98.1 (21 Nov 2018 15:30)  HR: 93 (22 Nov 2018 08:20) (93 - 103)  BP: 112/67 (22 Nov 2018 08:20) (112/67 - 147/74)  BP(mean): --  RR: 18 (22 Nov 2018 08:20) (16 - 18)  SpO2: 93% (22 Nov 2018 08:20) (93% - 98%)    I&O's Summary    21 Nov 2018 07:01  -  22 Nov 2018 07:00  --------------------------------------------------------  IN: 730 mL / OUT: 0 mL / NET: 730 mL          Physical Exam:   GENERAL: NAD, well-groomed, well-developed  HEENT: SEAN/   Atraumatic, Normocephalic  ENMT: No tonsillar erythema, exudates, or enlargement; Moist mucous membranes, Good dentition, No lesions  NECK: Supple, No JVD, Normal thyroid  CHEST/LUNG: Wheezing++  CVS: Regular rate and rhythm; No murmurs, rubs, or gallops  GI: : Soft, Nontender, Nondistended; Bowel sounds present  NERVOUS SYSTEM:  Alert & Oriented X3  EXTREMITIES:  2+ Peripheral Pulses, No clubbing, cyanosis, or edema  LYMPH: No lymphadenopathy noted  SKIN: No rashes or lesions  ENDOCRINOLOGY: No Thyromegaly  PSYCH: Appropriate    Labs:  26                            12.2   12.1  )-----------( 360      ( 21 Nov 2018 05:47 )             37.1                         11.4   10.3  )-----------( 347      ( 20 Nov 2018 11:08 )             34.8                         12.4   9.6   )-----------( 345      ( 19 Nov 2018 18:48 )             36.3     11-21    137  |  97  |  19  ----------------------------<  306<H>  4.3   |  24  |  0.89  11-20    135  |  98  |  18  ----------------------------<  349<H>  4.2   |  24  |  0.89  11-19    136  |  97  |  11  ----------------------------<  204<H>  3.8   |  23  |  0.89    Ca    9.7      21 Nov 2018 05:47  Ca    10.1      20 Nov 2018 11:08    TPro  7.5  /  Alb  3.9  /  TBili  0.3  /  DBili  x   /  AST  10  /  ALT  11  /  AlkPhos  61  11-19    CAPILLARY BLOOD GLUCOSE      POCT Blood Glucose.: 365 mg/dL (22 Nov 2018 08:13)  POCT Blood Glucose.: 326 mg/dL (21 Nov 2018 21:27)  POCT Blood Glucose.: 316 mg/dL (21 Nov 2018 17:09)  POCT Blood Glucose.: 399 mg/dL (21 Nov 2018 12:50)                  RECENT CULTURES:  11-20 @ 15:45 .Blood Blood                No growth to date.    11-20 @ 13:51 .Blood Blood     < from: CT Chest No Cont (11.19.18 @ 21:10) >  INTERPRETATION:  CLINICAL INFORMATION: Shortness of breath. Fever.   Evaluate for interstitial lung disease.    COMPARISON: Chest CT from 6/16/2018.    PROCEDURE:   CT of the Chest was performed without intravenous contrast.  Sagittal and coronal reformats were performed.      FINDINGS:    CHEST:     LUNGS AND LARGE AIRWAYS: Patent central airways.  There are patchy and   nodular groundglass opacities in the rightupper and lower lobes. There   are tree-in-bud opacities predominantly in the left lower lobe.  PLEURA: No pleural effusion.  VESSELS: Coronary artery calcifications.  HEART: Heart size is normal. No pericardial effusion.  MEDIASTINUM AND IBRAHIMA: No lymphadenopathy.  CHEST WALL AND LOWER NECK: Within normal limits.  VISUALIZED UPPER ABDOMEN: Within normal limits.  BONES: Within normal limits.    IMPRESSION: Groundglass and tree-in-bud opacities may be due to   infection. Recommend follow-up to resolution.                    ANTONIA DEAN M.D., RADIOLOGY RESIDENT  This document has been electronically signed.  LARRY REED M.D., ATTENDING RADIOLOGIST  This document has been electronically signed. Nov 19 2018 10:48PM        < end of copied text >             No growth to date.          RESPIRATORY CULTURES:          Studies  Chest X-RAY  CT SCAN Chest   Venous Dopplers: LE:   CT Abdomen  Others

## 2018-11-23 LAB
ANION GAP SERPL CALC-SCNC: 16 MMOL/L — SIGNIFICANT CHANGE UP (ref 5–17)
BASOPHILS # BLD AUTO: 0 K/UL — SIGNIFICANT CHANGE UP (ref 0–0.2)
BASOPHILS NFR BLD AUTO: 0.2 % — SIGNIFICANT CHANGE UP (ref 0–2)
BUN SERPL-MCNC: 28 MG/DL — HIGH (ref 7–23)
CALCIUM SERPL-MCNC: 9.1 MG/DL — SIGNIFICANT CHANGE UP (ref 8.4–10.5)
CHLORIDE SERPL-SCNC: 94 MMOL/L — LOW (ref 96–108)
CO2 SERPL-SCNC: 22 MMOL/L — SIGNIFICANT CHANGE UP (ref 22–31)
CREAT SERPL-MCNC: 1.05 MG/DL — SIGNIFICANT CHANGE UP (ref 0.5–1.3)
EOSINOPHIL # BLD AUTO: 0 K/UL — SIGNIFICANT CHANGE UP (ref 0–0.5)
EOSINOPHIL NFR BLD AUTO: 0.1 % — SIGNIFICANT CHANGE UP (ref 0–6)
GLUCOSE BLDC GLUCOMTR-MCNC: 355 MG/DL — HIGH (ref 70–99)
GLUCOSE BLDC GLUCOMTR-MCNC: 359 MG/DL — HIGH (ref 70–99)
GLUCOSE BLDC GLUCOMTR-MCNC: 369 MG/DL — HIGH (ref 70–99)
GLUCOSE BLDC GLUCOMTR-MCNC: 393 MG/DL — HIGH (ref 70–99)
GLUCOSE BLDC GLUCOMTR-MCNC: 398 MG/DL — HIGH (ref 70–99)
GLUCOSE SERPL-MCNC: 419 MG/DL — HIGH (ref 70–99)
HCT VFR BLD CALC: 34.2 % — LOW (ref 39–50)
HGB BLD-MCNC: 11.4 G/DL — LOW (ref 13–17)
LYMPHOCYTES # BLD AUTO: 0.8 K/UL — LOW (ref 1–3.3)
LYMPHOCYTES # BLD AUTO: 5.4 % — LOW (ref 13–44)
MCHC RBC-ENTMCNC: 26.2 PG — LOW (ref 27–34)
MCHC RBC-ENTMCNC: 33.4 GM/DL — SIGNIFICANT CHANGE UP (ref 32–36)
MCV RBC AUTO: 78.5 FL — LOW (ref 80–100)
MONOCYTES # BLD AUTO: 0.7 K/UL — SIGNIFICANT CHANGE UP (ref 0–0.9)
MONOCYTES NFR BLD AUTO: 4.9 % — SIGNIFICANT CHANGE UP (ref 2–14)
NEUTROPHILS # BLD AUTO: 13.4 K/UL — HIGH (ref 1.8–7.4)
NEUTROPHILS NFR BLD AUTO: 89.4 % — HIGH (ref 43–77)
PLATELET # BLD AUTO: 378 K/UL — SIGNIFICANT CHANGE UP (ref 150–400)
POTASSIUM SERPL-MCNC: 4.3 MMOL/L — SIGNIFICANT CHANGE UP (ref 3.5–5.3)
POTASSIUM SERPL-SCNC: 4.3 MMOL/L — SIGNIFICANT CHANGE UP (ref 3.5–5.3)
RBC # BLD: 4.36 M/UL — SIGNIFICANT CHANGE UP (ref 4.2–5.8)
RBC # FLD: 12.9 % — SIGNIFICANT CHANGE UP (ref 10.3–14.5)
SODIUM SERPL-SCNC: 132 MMOL/L — LOW (ref 135–145)
WBC # BLD: 14.9 K/UL — HIGH (ref 3.8–10.5)
WBC # FLD AUTO: 14.9 K/UL — HIGH (ref 3.8–10.5)

## 2018-11-23 RX ORDER — INSULIN LISPRO 100/ML
VIAL (ML) SUBCUTANEOUS AT BEDTIME
Qty: 0 | Refills: 0 | Status: DISCONTINUED | OUTPATIENT
Start: 2018-11-23 | End: 2018-11-24

## 2018-11-23 RX ADMIN — Medication 3 MILLILITER(S): at 05:01

## 2018-11-23 RX ADMIN — Medication 100 MILLIGRAM(S): at 17:12

## 2018-11-23 RX ADMIN — Medication 5: at 08:17

## 2018-11-23 RX ADMIN — Medication 100 MILLIGRAM(S): at 05:01

## 2018-11-23 RX ADMIN — Medication 5: at 12:41

## 2018-11-23 RX ADMIN — CEFTRIAXONE 100 GRAM(S): 500 INJECTION, POWDER, FOR SOLUTION INTRAMUSCULAR; INTRAVENOUS at 22:00

## 2018-11-23 RX ADMIN — BUDESONIDE AND FORMOTEROL FUMARATE DIHYDRATE 2 PUFF(S): 160; 4.5 AEROSOL RESPIRATORY (INHALATION) at 17:13

## 2018-11-23 RX ADMIN — ENOXAPARIN SODIUM 40 MILLIGRAM(S): 100 INJECTION SUBCUTANEOUS at 10:52

## 2018-11-23 RX ADMIN — Medication 5: at 17:13

## 2018-11-23 RX ADMIN — Medication 3 MILLILITER(S): at 10:51

## 2018-11-23 RX ADMIN — Medication 20 MILLIGRAM(S): at 10:51

## 2018-11-23 RX ADMIN — Medication 100 MILLIGRAM(S): at 10:50

## 2018-11-23 RX ADMIN — AMLODIPINE BESYLATE 5 MILLIGRAM(S): 2.5 TABLET ORAL at 05:01

## 2018-11-23 RX ADMIN — Medication 20 MILLIGRAM(S): at 05:03

## 2018-11-23 RX ADMIN — Medication 3 MILLILITER(S): at 17:13

## 2018-11-23 RX ADMIN — Medication 1200 MILLIGRAM(S): at 05:01

## 2018-11-23 RX ADMIN — Medication 3: at 23:03

## 2018-11-23 RX ADMIN — BUDESONIDE AND FORMOTEROL FUMARATE DIHYDRATE 2 PUFF(S): 160; 4.5 AEROSOL RESPIRATORY (INHALATION) at 05:01

## 2018-11-23 RX ADMIN — Medication 100 MILLIGRAM(S): at 15:44

## 2018-11-23 RX ADMIN — AZITHROMYCIN 250 MILLIGRAM(S): 500 TABLET, FILM COATED ORAL at 22:49

## 2018-11-23 RX ADMIN — Medication 1200 MILLIGRAM(S): at 17:13

## 2018-11-23 RX ADMIN — MONTELUKAST 10 MILLIGRAM(S): 4 TABLET, CHEWABLE ORAL at 22:00

## 2018-11-23 NOTE — PROGRESS NOTE ADULT - PROBLEM SELECTOR PLAN 1
Still with significant wheezing: Cont IV steroids  11/23: cont IV steroids today too: He is still significantly wheezing: Had coughing episode in AM and can give hycodan in night prn for increased cough: But his cough is secondary to bronchospasm: Cont steroids

## 2018-11-23 NOTE — PROGRESS NOTE ADULT - PROBLEM SELECTOR PLAN 4
high blood glucose likely due to steroids: Cont to control:  11/23: his blood glucoses are running high ,secondary to steroids

## 2018-11-23 NOTE — PROGRESS NOTE ADULT - SUBJECTIVE AND OBJECTIVE BOX
Patient is a 68y old  Male who presents with a chief complaint of shortness of breath (23 Nov 2018 11:34)    pt. seen and examined, doing better, no wheezing     INTERVAL HPI/OVERNIGHT EVENTS:  T(C): 36.7 (11-23-18 @ 16:28), Max: 36.7 (11-23-18 @ 00:18)  HR: 99 (11-23-18 @ 16:28) (74 - 99)  BP: 134/79 (11-23-18 @ 16:28) (127/76 - 142/72)  RR: 20 (11-23-18 @ 16:28) (18 - 20)  SpO2: 96% (11-23-18 @ 16:28) (95% - 98%)  Wt(kg): --  I&O's Summary    22 Nov 2018 07:01  -  23 Nov 2018 07:00  --------------------------------------------------------  IN: 1660 mL / OUT: 0 mL / NET: 1660 mL    23 Nov 2018 07:01  -  23 Nov 2018 18:37  --------------------------------------------------------  IN: 480 mL / OUT: 0 mL / NET: 480 mL        PAST MEDICAL & SURGICAL HISTORY:  COPD (chronic obstructive pulmonary disease)  HLD (hyperlipidemia)  DM (diabetes mellitus)  HTN (hypertension)  No significant past surgical history      SOCIAL HISTORY  Alcohol:  Tobacco:  Illicit substance use:    FAMILY HISTORY:    REVIEW OF SYSTEMS:  CONSTITUTIONAL: No fever, weight loss, or fatigue  EYES: No eye pain, visual disturbances, or discharge  ENMT:  No difficulty hearing, tinnitus, vertigo; No sinus or throat pain  NECK: No pain or stiffness  RESPIRATORY: No cough, wheezing, chills or hemoptysis; No shortness of breath  CARDIOVASCULAR: No chest pain, palpitations, dizziness, or leg swelling  GASTROINTESTINAL: No abdominal or epigastric pain. No nausea, vomiting, or hematemesis; No diarrhea or constipation. No melena or hematochezia.  GENITOURINARY: No dysuria, frequency, hematuria, or incontinence  NEUROLOGICAL: No headaches, memory loss, loss of strength, numbness, or tremors  SKIN: No itching, burning, rashes, or lesions   LYMPH NODES: No enlarged glands  ENDOCRINE: No heat or cold intolerance; No hair loss  MUSCULOSKELETAL: No joint pain or swelling; No muscle, back, or extremity pain  PSYCHIATRIC: No depression, anxiety, mood swings, or difficulty sleeping  HEME/LYMPH: No easy bruising, or bleeding gums  ALLERY AND IMMUNOLOGIC: No hives or eczema    RADIOLOGY & ADDITIONAL TESTS:    Imaging Personally Reviewed:  [ ] YES  [ ] NO    Consultant(s) Notes Reviewed:  [ ] YES  [ ] NO    PHYSICAL EXAM:  GENERAL: NAD, well-groomed, well-developed  HEAD:  Atraumatic, Normocephalic  EYES: EOMI, PERRLA, conjunctiva and sclera clear  ENMT: No tonsillar erythema, exudates, or enlargement; Moist mucous membranes, Good dentition, No lesions  NECK: Supple, No JVD, Normal thyroid  NERVOUS SYSTEM:  Alert & Oriented X3, Good concentration; Motor Strength 5/5 B/L upper and lower extremities; DTRs 2+ intact and symmetric  CHEST/LUNG: Clear to percussion bilaterally; No rales, rhonchi, wheezing, or rubs  HEART: Regular rate and rhythm; No murmurs, rubs, or gallops  ABDOMEN: Soft, Nontender, Nondistended; Bowel sounds present  EXTREMITIES:  2+ Peripheral Pulses, No clubbing, cyanosis, or edema  LYMPH: No lymphadenopathy noted  SKIN: No rashes or lesions    LABS:                        11.4   14.9  )-----------( 378      ( 23 Nov 2018 14:54 )             34.2     11-23    132<L>  |  94<L>  |  28<H>  ----------------------------<  419<H>  4.3   |  22  |  1.05    Ca    9.1      23 Nov 2018 14:54          CAPILLARY BLOOD GLUCOSE      POCT Blood Glucose.: 359 mg/dL (23 Nov 2018 17:06)  POCT Blood Glucose.: 398 mg/dL (23 Nov 2018 12:10)  POCT Blood Glucose.: 355 mg/dL (23 Nov 2018 08:13)  POCT Blood Glucose.: 323 mg/dL (22 Nov 2018 22:26)            MEDICATIONS  (STANDING):  ALBUTerol/ipratropium for Nebulization 3 milliLiter(s) Nebulizer every 6 hours  amLODIPine   Tablet 5 milliGRAM(s) Oral daily  azithromycin  IVPB 500 milliGRAM(s) IV Intermittent <User Schedule>  buDESOnide 160 MICROgram(s)/formoterol 4.5 MICROgram(s) Inhaler 2 Puff(s) Inhalation two times a day  cefTRIAXone   IVPB 1 Gram(s) IV Intermittent <User Schedule>  dextrose 5%. 1000 milliLiter(s) (50 mL/Hr) IV Continuous <Continuous>  dextrose 50% Injectable 12.5 Gram(s) IV Push once  enoxaparin Injectable 40 milliGRAM(s) SubCutaneous every 24 hours  guaiFENesin ER 1200 milliGRAM(s) Oral every 12 hours  influenza   Vaccine 0.5 milliLiter(s) IntraMuscular once  insulin lispro (HumaLOG) corrective regimen sliding scale   SubCutaneous three times a day before meals  montelukast 10 milliGRAM(s) Oral at bedtime    MEDICATIONS  (PRN):  benzonatate 100 milliGRAM(s) Oral every 8 hours PRN Cough  dextrose 40% Gel 15 Gram(s) Oral once PRN Blood Glucose LESS THAN 70 milliGRAM(s)/deciliter  glucagon  Injectable 1 milliGRAM(s) IntraMuscular once PRN Glucose LESS THAN 70 milligrams/deciliter  guaiFENesin   Syrup  (Sugar-Free) 100 milliGRAM(s) Oral every 6 hours PRN Cough  HYDROcodone/homatropine Syrup 5 milliLiter(s) Oral every 6 hours PRN mild pain      Care Discussed with Consultants/Other Providers [ ] YES  [ ] NO

## 2018-11-23 NOTE — PROGRESS NOTE ADULT - SUBJECTIVE AND OBJECTIVE BOX
Patient is a 68y old  Male who presents with a chief complaint of shortness of breath (22 Nov 2018 20:20)      Any change in ROS: Doing ok : no SOB     MEDICATIONS  (STANDING):  ALBUTerol/ipratropium for Nebulization 3 milliLiter(s) Nebulizer every 6 hours  amLODIPine   Tablet 5 milliGRAM(s) Oral daily  azithromycin  IVPB 500 milliGRAM(s) IV Intermittent <User Schedule>  buDESOnide 160 MICROgram(s)/formoterol 4.5 MICROgram(s) Inhaler 2 Puff(s) Inhalation two times a day  cefTRIAXone   IVPB 1 Gram(s) IV Intermittent <User Schedule>  dextrose 5%. 1000 milliLiter(s) (50 mL/Hr) IV Continuous <Continuous>  dextrose 50% Injectable 12.5 Gram(s) IV Push once  enoxaparin Injectable 40 milliGRAM(s) SubCutaneous every 24 hours  guaiFENesin ER 1200 milliGRAM(s) Oral every 12 hours  influenza   Vaccine 0.5 milliLiter(s) IntraMuscular once  insulin lispro (HumaLOG) corrective regimen sliding scale   SubCutaneous at bedtime  insulin lispro (HumaLOG) corrective regimen sliding scale   SubCutaneous three times a day before meals  montelukast 10 milliGRAM(s) Oral at bedtime    MEDICATIONS  (PRN):  benzonatate 100 milliGRAM(s) Oral every 8 hours PRN Cough  dextrose 40% Gel 15 Gram(s) Oral once PRN Blood Glucose LESS THAN 70 milliGRAM(s)/deciliter  glucagon  Injectable 1 milliGRAM(s) IntraMuscular once PRN Glucose LESS THAN 70 milligrams/deciliter  guaiFENesin   Syrup  (Sugar-Free) 100 milliGRAM(s) Oral every 6 hours PRN Cough  HYDROcodone/homatropine Syrup 5 milliLiter(s) Oral every 6 hours PRN mild pain    Vital Signs Last 24 Hrs  T(C): 36.7 (23 Nov 2018 07:26), Max: 36.7 (23 Nov 2018 00:18)  T(F): 98.1 (23 Nov 2018 07:26), Max: 98.1 (23 Nov 2018 07:26)  HR: 95 (23 Nov 2018 07:26) (74 - 99)  BP: 142/72 (23 Nov 2018 07:26) (127/76 - 142/72)  BP(mean): --  RR: 18 (23 Nov 2018 07:26) (18 - 20)  SpO2: 95% (23 Nov 2018 07:26) (93% - 98%)    I&O's Summary    22 Nov 2018 07:01  -  23 Nov 2018 07:00  --------------------------------------------------------  IN: 1660 mL / OUT: 0 mL / NET: 1660 mL          Physical Exam:   GENERAL: NAD, well-groomed, well-developed  HEENT: SEAN/   Atraumatic, Normocephalic  ENMT: No tonsillar erythema, exudates, or enlargement; Moist mucous membranes, Good dentition, No lesions  NECK: Supple, No JVD, Normal thyroid  CHEST/LUNG: wheezing+  CVS: Regular rate and rhythm; No murmurs, rubs, or gallops  GI: : Soft, Nontender, Nondistended; Bowel sounds present  NERVOUS SYSTEM:  Alert & Oriented X3  EXTREMITIES:  2+ Peripheral Pulses, No clubbing, cyanosis, or edema  LYMPH: No lymphadenopathy noted  SKIN: No rashes or lesions  ENDOCRINOLOGY: No Thyromegaly  PSYCH: Appropriate    Labs:  26                            12.2   12.1  )-----------( 360      ( 21 Nov 2018 05:47 )             37.1                         11.4   10.3  )-----------( 347      ( 20 Nov 2018 11:08 )             34.8                         12.4   9.6   )-----------( 345      ( 19 Nov 2018 18:48 )             36.3     11-21    137  |  97  |  19  ----------------------------<  306<H>  4.3   |  24  |  0.89  11-20    135  |  98  |  18  ----------------------------<  349<H>  4.2   |  24  |  0.89  11-19    136  |  97  |  11  ----------------------------<  204<H>  3.8   |  23  |  0.89      TPro  7.5  /  Alb  3.9  /  TBili  0.3  /  DBili  x   /  AST  10  /  ALT  11  /  AlkPhos  61  11-19    CAPILLARY BLOOD GLUCOSE      POCT Blood Glucose.: 355 mg/dL (23 Nov 2018 08:13)  POCT Blood Glucose.: 323 mg/dL (22 Nov 2018 22:26)  POCT Blood Glucose.: 338 mg/dL (22 Nov 2018 17:17)  POCT Blood Glucose.: 449 mg/dL (22 Nov 2018 12:00)          < from: CT Chest No Cont (11.19.18 @ 21:10) >    INTERPRETATION:  CLINICAL INFORMATION: Shortness of breath. Fever.   Evaluate for interstitial lung disease.    COMPARISON: Chest CT from 6/16/2018.    PROCEDURE:   CT of the Chest was performed without intravenous contrast.  Sagittal and coronal reformats were performed.      FINDINGS:    CHEST:     LUNGS AND LARGE AIRWAYS: Patent central airways.  There are patchy and   nodular groundglass opacities in the rightupper and lower lobes. There   are tree-in-bud opacities predominantly in the left lower lobe.  PLEURA: No pleural effusion.  VESSELS: Coronary artery calcifications.  HEART: Heart size is normal. No pericardial effusion.  MEDIASTINUM AND IBRAHIMA: No lymphadenopathy.  CHEST WALL AND LOWER NECK: Within normal limits.  VISUALIZED UPPER ABDOMEN: Within normal limits.  BONES: Within normal limits.    IMPRESSION: Groundglass and tree-in-bud opacities may be due to   infection. Recommend follow-up to resolution.                    ANTONIA DEAN M.D., RADIOLOGY RESIDENT  This document has been electronically signed.  LARRY REED M.D., ATTENDING RADIOLOGIST  This document has been electronically signed. Nov 19 2018 10:48PM        < end of copied text >          RECENT CULTURES:  11-20 @ 15:45 .Blood Blood                No growth to date.    11-20 @ 13:51 .Blood Blood                No growth to date.          RESPIRATORY CULTURES:          Studies  Chest X-RAY  CT SCAN Chest   Venous Dopplers: LE:   CT Abdomen  Others

## 2018-11-24 LAB
ANION GAP SERPL CALC-SCNC: 15 MMOL/L — SIGNIFICANT CHANGE UP (ref 5–17)
BASOPHILS # BLD AUTO: 0.02 K/UL — SIGNIFICANT CHANGE UP (ref 0–0.2)
BASOPHILS NFR BLD AUTO: 0.2 % — SIGNIFICANT CHANGE UP (ref 0–2)
BUN SERPL-MCNC: 23 MG/DL — SIGNIFICANT CHANGE UP (ref 7–23)
CALCIUM SERPL-MCNC: 8.8 MG/DL — SIGNIFICANT CHANGE UP (ref 8.4–10.5)
CHLORIDE SERPL-SCNC: 97 MMOL/L — SIGNIFICANT CHANGE UP (ref 96–108)
CO2 SERPL-SCNC: 25 MMOL/L — SIGNIFICANT CHANGE UP (ref 22–31)
CREAT SERPL-MCNC: 0.93 MG/DL — SIGNIFICANT CHANGE UP (ref 0.5–1.3)
EOSINOPHIL # BLD AUTO: 0 K/UL — SIGNIFICANT CHANGE UP (ref 0–0.5)
EOSINOPHIL NFR BLD AUTO: 0 % — SIGNIFICANT CHANGE UP (ref 0–6)
GLUCOSE BLDC GLUCOMTR-MCNC: 205 MG/DL — HIGH (ref 70–99)
GLUCOSE BLDC GLUCOMTR-MCNC: 233 MG/DL — HIGH (ref 70–99)
GLUCOSE BLDC GLUCOMTR-MCNC: 373 MG/DL — HIGH (ref 70–99)
GLUCOSE BLDC GLUCOMTR-MCNC: 439 MG/DL — HIGH (ref 70–99)
GLUCOSE BLDC GLUCOMTR-MCNC: 450 MG/DL — CRITICAL HIGH (ref 70–99)
GLUCOSE BLDC GLUCOMTR-MCNC: 481 MG/DL — CRITICAL HIGH (ref 70–99)
GLUCOSE SERPL-MCNC: 247 MG/DL — HIGH (ref 70–99)
HCT VFR BLD CALC: 33.2 % — LOW (ref 39–50)
HGB BLD-MCNC: 10.8 G/DL — LOW (ref 13–17)
IMM GRANULOCYTES NFR BLD AUTO: 1.5 % — SIGNIFICANT CHANGE UP (ref 0–1.5)
LYMPHOCYTES # BLD AUTO: 2.56 K/UL — SIGNIFICANT CHANGE UP (ref 1–3.3)
LYMPHOCYTES # BLD AUTO: 20.6 % — SIGNIFICANT CHANGE UP (ref 13–44)
MCHC RBC-ENTMCNC: 26 PG — LOW (ref 27–34)
MCHC RBC-ENTMCNC: 32.5 GM/DL — SIGNIFICANT CHANGE UP (ref 32–36)
MCV RBC AUTO: 79.8 FL — LOW (ref 80–100)
MONOCYTES # BLD AUTO: 1.02 K/UL — HIGH (ref 0–0.9)
MONOCYTES NFR BLD AUTO: 8.2 % — SIGNIFICANT CHANGE UP (ref 2–14)
NEUTROPHILS # BLD AUTO: 8.62 K/UL — HIGH (ref 1.8–7.4)
NEUTROPHILS NFR BLD AUTO: 69.5 % — SIGNIFICANT CHANGE UP (ref 43–77)
PLATELET # BLD AUTO: 341 K/UL — SIGNIFICANT CHANGE UP (ref 150–400)
POTASSIUM SERPL-MCNC: 3.4 MMOL/L — LOW (ref 3.5–5.3)
POTASSIUM SERPL-SCNC: 3.4 MMOL/L — LOW (ref 3.5–5.3)
RBC # BLD: 4.16 M/UL — LOW (ref 4.2–5.8)
RBC # FLD: 13.2 % — SIGNIFICANT CHANGE UP (ref 10.3–14.5)
SODIUM SERPL-SCNC: 137 MMOL/L — SIGNIFICANT CHANGE UP (ref 135–145)
WBC # BLD: 12.4 K/UL — HIGH (ref 3.8–10.5)
WBC # FLD AUTO: 12.4 K/UL — HIGH (ref 3.8–10.5)

## 2018-11-24 RX ORDER — INSULIN GLARGINE 100 [IU]/ML
5 INJECTION, SOLUTION SUBCUTANEOUS AT BEDTIME
Qty: 0 | Refills: 0 | Status: DISCONTINUED | OUTPATIENT
Start: 2018-11-24 | End: 2018-11-24

## 2018-11-24 RX ORDER — INSULIN LISPRO 100/ML
VIAL (ML) SUBCUTANEOUS
Qty: 0 | Refills: 0 | Status: DISCONTINUED | OUTPATIENT
Start: 2018-11-24 | End: 2018-11-27

## 2018-11-24 RX ORDER — INSULIN LISPRO 100/ML
4 VIAL (ML) SUBCUTANEOUS ONCE
Qty: 0 | Refills: 0 | Status: COMPLETED | OUTPATIENT
Start: 2018-11-24 | End: 2018-11-24

## 2018-11-24 RX ORDER — INSULIN LISPRO 100/ML
6 VIAL (ML) SUBCUTANEOUS
Qty: 0 | Refills: 0 | Status: DISCONTINUED | OUTPATIENT
Start: 2018-11-24 | End: 2018-11-27

## 2018-11-24 RX ORDER — INSULIN LISPRO 100/ML
VIAL (ML) SUBCUTANEOUS AT BEDTIME
Qty: 0 | Refills: 0 | Status: DISCONTINUED | OUTPATIENT
Start: 2018-11-24 | End: 2018-11-27

## 2018-11-24 RX ORDER — POTASSIUM CHLORIDE 20 MEQ
20 PACKET (EA) ORAL ONCE
Qty: 0 | Refills: 0 | Status: COMPLETED | OUTPATIENT
Start: 2018-11-24 | End: 2018-11-24

## 2018-11-24 RX ADMIN — Medication 100 MILLIGRAM(S): at 11:17

## 2018-11-24 RX ADMIN — AMLODIPINE BESYLATE 5 MILLIGRAM(S): 2.5 TABLET ORAL at 05:56

## 2018-11-24 RX ADMIN — CEFTRIAXONE 100 GRAM(S): 500 INJECTION, POWDER, FOR SOLUTION INTRAMUSCULAR; INTRAVENOUS at 21:59

## 2018-11-24 RX ADMIN — Medication 20 MILLIGRAM(S): at 22:01

## 2018-11-24 RX ADMIN — Medication 1200 MILLIGRAM(S): at 17:22

## 2018-11-24 RX ADMIN — Medication 6: at 17:20

## 2018-11-24 RX ADMIN — Medication 100 MILLIGRAM(S): at 16:00

## 2018-11-24 RX ADMIN — Medication 3 MILLILITER(S): at 23:50

## 2018-11-24 RX ADMIN — MONTELUKAST 10 MILLIGRAM(S): 4 TABLET, CHEWABLE ORAL at 21:59

## 2018-11-24 RX ADMIN — Medication 20 MILLIEQUIVALENT(S): at 19:54

## 2018-11-24 RX ADMIN — Medication 100 MILLIGRAM(S): at 08:28

## 2018-11-24 RX ADMIN — Medication 100 MILLIGRAM(S): at 21:59

## 2018-11-24 RX ADMIN — Medication 100 MILLIGRAM(S): at 01:57

## 2018-11-24 RX ADMIN — Medication 4 UNIT(S): at 18:24

## 2018-11-24 RX ADMIN — Medication 2: at 08:28

## 2018-11-24 RX ADMIN — BUDESONIDE AND FORMOTEROL FUMARATE DIHYDRATE 2 PUFF(S): 160; 4.5 AEROSOL RESPIRATORY (INHALATION) at 17:59

## 2018-11-24 RX ADMIN — AZITHROMYCIN 250 MILLIGRAM(S): 500 TABLET, FILM COATED ORAL at 21:59

## 2018-11-24 RX ADMIN — Medication 3 MILLILITER(S): at 00:51

## 2018-11-24 RX ADMIN — ENOXAPARIN SODIUM 40 MILLIGRAM(S): 100 INJECTION SUBCUTANEOUS at 11:17

## 2018-11-24 RX ADMIN — BUDESONIDE AND FORMOTEROL FUMARATE DIHYDRATE 2 PUFF(S): 160; 4.5 AEROSOL RESPIRATORY (INHALATION) at 05:56

## 2018-11-24 RX ADMIN — Medication 100 MILLIGRAM(S): at 22:01

## 2018-11-24 RX ADMIN — Medication 3 MILLILITER(S): at 11:22

## 2018-11-24 RX ADMIN — Medication 3 MILLILITER(S): at 17:22

## 2018-11-24 RX ADMIN — Medication 2: at 13:15

## 2018-11-24 RX ADMIN — Medication 3 MILLILITER(S): at 05:56

## 2018-11-24 RX ADMIN — Medication 6: at 22:41

## 2018-11-24 RX ADMIN — Medication 20 MILLIGRAM(S): at 13:15

## 2018-11-24 RX ADMIN — Medication 1200 MILLIGRAM(S): at 05:56

## 2018-11-24 NOTE — PROGRESS NOTE ADULT - PROBLEM SELECTOR PROBLEM 4
s/p nephro tube, urology following, monitoring kidney function, I/O and daily weight. Diabetes 1.5, managed as type 2

## 2018-11-24 NOTE — PROGRESS NOTE ADULT - SUBJECTIVE AND OBJECTIVE BOX
Patient is a 68y old  Male who presents with a chief complaint of shortness of breath (24 Nov 2018 08:00)    pt. seen and examined, again wheezing today    INTERVAL HPI/OVERNIGHT EVENTS:  T(C): 36.8 (11-24-18 @ 08:22), Max: 36.8 (11-24-18 @ 08:22)  HR: 92 (11-24-18 @ 08:22) (82 - 99)  BP: 134/72 (11-24-18 @ 08:22) (118/67 - 134/79)  RR: 18 (11-24-18 @ 08:22) (16 - 20)  SpO2: 95% (11-24-18 @ 08:22) (95% - 97%)  Wt(kg): --  I&O's Summary    23 Nov 2018 07:01  -  24 Nov 2018 07:00  --------------------------------------------------------  IN: 780 mL / OUT: 0 mL / NET: 780 mL    24 Nov 2018 07:01  -  24 Nov 2018 13:19  --------------------------------------------------------  IN: 180 mL / OUT: 0 mL / NET: 180 mL        PAST MEDICAL & SURGICAL HISTORY:  COPD (chronic obstructive pulmonary disease)  HLD (hyperlipidemia)  DM (diabetes mellitus)  HTN (hypertension)  No significant past surgical history      SOCIAL HISTORY  Alcohol:  Tobacco:  Illicit substance use:    FAMILY HISTORY:    REVIEW OF SYSTEMS:  CONSTITUTIONAL: No fever, weight loss, or fatigue  EYES: No eye pain, visual disturbances, or discharge  ENMT:  No difficulty hearing, tinnitus, vertigo; No sinus or throat pain  NECK: No pain or stiffness  RESPIRATORY: No cough, wheezing, chills or hemoptysis; No shortness of breath  CARDIOVASCULAR: No chest pain, palpitations, dizziness, or leg swelling  GASTROINTESTINAL: No abdominal or epigastric pain. No nausea, vomiting, or hematemesis; No diarrhea or constipation. No melena or hematochezia.  GENITOURINARY: No dysuria, frequency, hematuria, or incontinence  NEUROLOGICAL: No headaches, memory loss, loss of strength, numbness, or tremors  SKIN: No itching, burning, rashes, or lesions   LYMPH NODES: No enlarged glands  ENDOCRINE: No heat or cold intolerance; No hair loss  MUSCULOSKELETAL: No joint pain or swelling; No muscle, back, or extremity pain  PSYCHIATRIC: No depression, anxiety, mood swings, or difficulty sleeping  HEME/LYMPH: No easy bruising, or bleeding gums  ALLERY AND IMMUNOLOGIC: No hives or eczema    RADIOLOGY & ADDITIONAL TESTS:    Imaging Personally Reviewed:  [ ] YES  [ ] NO    Consultant(s) Notes Reviewed:  [ ] YES  [ ] NO    PHYSICAL EXAM:  GENERAL: NAD, well-groomed, well-developed  HEAD:  Atraumatic, Normocephalic  EYES: EOMI, PERRLA, conjunctiva and sclera clear  ENMT: No tonsillar erythema, exudates, or enlargement; Moist mucous membranes, Good dentition, No lesions  NECK: Supple, No JVD, Normal thyroid  NERVOUS SYSTEM:  Alert & Oriented X3, Good concentration; Motor Strength 5/5 B/L upper and lower extremities; DTRs 2+ intact and symmetric  CHEST/LUNG: Clear to percussion bilaterally; No rales, rhonchi, wheezing, or rubs  HEART: Regular rate and rhythm; No murmurs, rubs, or gallops  ABDOMEN: Soft, Nontender, Nondistended; Bowel sounds present  EXTREMITIES:  2+ Peripheral Pulses, No clubbing, cyanosis, or edema  LYMPH: No lymphadenopathy noted  SKIN: No rashes or lesions    LABS:                        10.8   12.40 )-----------( 341      ( 24 Nov 2018 10:18 )             33.2     11-24    137  |  97  |  23  ----------------------------<  247<H>  3.4<L>   |  25  |  0.93    Ca    8.8      24 Nov 2018 07:49          CAPILLARY BLOOD GLUCOSE      POCT Blood Glucose.: 233 mg/dL (24 Nov 2018 12:32)  POCT Blood Glucose.: 205 mg/dL (24 Nov 2018 08:21)  POCT Blood Glucose.: 393 mg/dL (23 Nov 2018 23:00)  POCT Blood Glucose.: 369 mg/dL (23 Nov 2018 21:07)  POCT Blood Glucose.: 359 mg/dL (23 Nov 2018 17:06)            MEDICATIONS  (STANDING):  ALBUTerol/ipratropium for Nebulization 3 milliLiter(s) Nebulizer every 6 hours  amLODIPine   Tablet 5 milliGRAM(s) Oral daily  azithromycin  IVPB 500 milliGRAM(s) IV Intermittent <User Schedule>  buDESOnide 160 MICROgram(s)/formoterol 4.5 MICROgram(s) Inhaler 2 Puff(s) Inhalation two times a day  cefTRIAXone   IVPB 1 Gram(s) IV Intermittent <User Schedule>  dextrose 5%. 1000 milliLiter(s) (50 mL/Hr) IV Continuous <Continuous>  dextrose 50% Injectable 12.5 Gram(s) IV Push once  enoxaparin Injectable 40 milliGRAM(s) SubCutaneous every 24 hours  guaiFENesin ER 1200 milliGRAM(s) Oral every 12 hours  influenza   Vaccine 0.5 milliLiter(s) IntraMuscular once  insulin lispro (HumaLOG) corrective regimen sliding scale   SubCutaneous at bedtime  insulin lispro (HumaLOG) corrective regimen sliding scale   SubCutaneous three times a day before meals  methylPREDNISolone sodium succinate Injectable 20 milliGRAM(s) IV Push every 8 hours  montelukast 10 milliGRAM(s) Oral at bedtime    MEDICATIONS  (PRN):  benzonatate 100 milliGRAM(s) Oral every 8 hours PRN Cough  dextrose 40% Gel 15 Gram(s) Oral once PRN Blood Glucose LESS THAN 70 milliGRAM(s)/deciliter  glucagon  Injectable 1 milliGRAM(s) IntraMuscular once PRN Glucose LESS THAN 70 milligrams/deciliter  guaiFENesin   Syrup  (Sugar-Free) 100 milliGRAM(s) Oral every 6 hours PRN Cough  HYDROcodone/homatropine Syrup 5 milliLiter(s) Oral every 6 hours PRN mild pain      Care Discussed with Consultants/Other Providers [ ] YES  [ ] NO

## 2018-11-24 NOTE — PROGRESS NOTE ADULT - PROBLEM SELECTOR PLAN 2
CT scan reviewed: Cont antibiotics  11/23: would need total of 7 days oaf antbitotics:  11/24 continue antibiotics Azithromycin and Rocephin D5/7 CT scan reviewed: Cont antibiotics  11/23: would need total of 7 days oaf antbitotics:  11/24 continue antibiotics Azithromycin and Rocephin D5/7  dc azithromycin in total of days and ceftriaxone in 7 days

## 2018-11-24 NOTE — PROGRESS NOTE ADULT - PROBLEM SELECTOR PLAN 1
Still with significant wheezing: Cont IV steroids  11/23: cont IV steroids today too: He is still significantly wheezing: Had coughing episode in AM and can give hycodan in night prn for increased cough: But his cough is secondary to bronchospasm: Cont steroids  11/24 wheezing without acute respiratory distress. Continue Duoneb, Symbicort, Singulair as Pt feels symptoms are alleviated. Still with significant wheezing: Cont IV steroids  11/23: cont IV steroids today too: He is still significantly wheezing: Had coughing episode in AM and can give hycodan in night prn for increased cough: But his cough is secondary to bronchospasm: Cont steroids  11/24 wheezing without acute respiratory distress. Continue Duoneb, Symbicort, Singulair as Pt feels symptoms are getting better: Cont steroids

## 2018-11-24 NOTE — PROGRESS NOTE ADULT - PROBLEM SELECTOR PLAN 1
Likely viral indused  -seen by pul   -c/w IV steroid , dose decreased  -c/w neb rx  -d/w pulmonary Dr. Little : will c/w IV steroid today also

## 2018-11-24 NOTE — PROGRESS NOTE ADULT - SUBJECTIVE AND OBJECTIVE BOX
Patient is a 68y old  Male who presents with a chief complaint of shortness of breath (23 Nov 2018 18:37)    Any change in ROS: SOB alleviated. on room air. cough (+), coarse wheezing (+)     MEDICATIONS  (STANDING):  ALBUTerol/ipratropium for Nebulization 3 milliLiter(s) Nebulizer every 6 hours  amLODIPine   Tablet 5 milliGRAM(s) Oral daily  azithromycin  IVPB 500 milliGRAM(s) IV Intermittent <User Schedule>  buDESOnide 160 MICROgram(s)/formoterol 4.5 MICROgram(s) Inhaler 2 Puff(s) Inhalation two times a day  cefTRIAXone   IVPB 1 Gram(s) IV Intermittent <User Schedule>  dextrose 5%. 1000 milliLiter(s) (50 mL/Hr) IV Continuous <Continuous>  dextrose 50% Injectable 12.5 Gram(s) IV Push once  enoxaparin Injectable 40 milliGRAM(s) SubCutaneous every 24 hours  guaiFENesin ER 1200 milliGRAM(s) Oral every 12 hours  influenza   Vaccine 0.5 milliLiter(s) IntraMuscular once  insulin lispro (HumaLOG) corrective regimen sliding scale   SubCutaneous at bedtime  insulin lispro (HumaLOG) corrective regimen sliding scale   SubCutaneous three times a day before meals  montelukast 10 milliGRAM(s) Oral at bedtime    MEDICATIONS  (PRN):  benzonatate 100 milliGRAM(s) Oral every 8 hours PRN Cough  dextrose 40% Gel 15 Gram(s) Oral once PRN Blood Glucose LESS THAN 70 milliGRAM(s)/deciliter  glucagon  Injectable 1 milliGRAM(s) IntraMuscular once PRN Glucose LESS THAN 70 milligrams/deciliter  guaiFENesin   Syrup  (Sugar-Free) 100 milliGRAM(s) Oral every 6 hours PRN Cough  HYDROcodone/homatropine Syrup 5 milliLiter(s) Oral every 6 hours PRN mild pain    Vital Signs Last 24 Hrs  T(C): 36.7 (24 Nov 2018 00:14), Max: 36.7 (23 Nov 2018 16:28)  T(F): 98 (24 Nov 2018 00:14), Max: 98 (23 Nov 2018 16:28)  HR: 82 (24 Nov 2018 05:56) (82 - 99)  BP: 118/67 (24 Nov 2018 05:56) (118/67 - 134/79)  BP(mean): --  RR: 18 (24 Nov 2018 05:56) (16 - 20)  SpO2: 95% (24 Nov 2018 05:56) (95% - 97%)    I&O's Summary    23 Nov 2018 07:01  -  24 Nov 2018 07:00  --------------------------------------------------------  IN: 780 mL / OUT: 0 mL / NET: 780 mL        Physical Exam:   GENERAL: The patient comfortable with no apparent distress.   HEENT: Head is normocephalic and atraumatic.    NECK: Supple with no elevated JVP.  LUNGS:  wheeze  HEART: S1 and S2 present without murmur.  ABDOMEN: Soft, nontender, and nondistended. No hepatosplenomegaly is noted.  EXTREMITIES: No edema or calf tenderness.  NEUROLOGIC: Grossly intact.    Labs:  26                            11.4   14.9  )-----------( 378      ( 23 Nov 2018 14:54 )             34.2                         12.2   12.1  )-----------( 360      ( 21 Nov 2018 05:47 )             37.1                         11.4   10.3  )-----------( 347      ( 20 Nov 2018 11:08 )             34.8     11-23    132<L>  |  94<L>  |  28<H>  ----------------------------<  419<H>  4.3   |  22  |  1.05  11-21    137  |  97  |  19  ----------------------------<  306<H>  4.3   |  24  |  0.89  11-20    135  |  98  |  18  ----------------------------<  349<H>  4.2   |  24  |  0.89    Ca    9.1      23 Nov 2018 14:54      CAPILLARY BLOOD GLUCOSE      POCT Blood Glucose.: 393 mg/dL (23 Nov 2018 23:00)  POCT Blood Glucose.: 369 mg/dL (23 Nov 2018 21:07)  POCT Blood Glucose.: 359 mg/dL (23 Nov 2018 17:06)  POCT Blood Glucose.: 398 mg/dL (23 Nov 2018 12:10)  POCT Blood Glucose.: 355 mg/dL (23 Nov 2018 08:13)      Studies  Chest X-RAY < from: Xray Chest 1 View- PORTABLE-Urgent (11.19.18 @ 20:09) >  EXAM:  XR CHEST PORTABLE URGENT 1V                            PROCEDURE DATE:  11/19/2018            INTERPRETATION:  CLINICAL INFORMATION: Chest pain, cough.    TECHNIQUE: Single frontal radiograph of the chest dated 11/19/2018 8:09   PM.    COMPARISON: Chest radiograph dated 6/16/2018.    FINDINGS:  The lungs are clear.  No pleural effusions or pneumothorax.  Heart size is within normal limits.   No acute osseous abnormality.       IMPRESSION: Clear lungs.    < end of copied text >    CT SCAN Chest    < from: CT Chest No Cont (11.19.18 @ 21:10) >    EXAM:  CT CHEST                            PROCEDURE DATE:  11/19/2018            INTERPRETATION:  CLINICAL INFORMATION: Shortness of breath. Fever.   Evaluate for interstitial lung disease.    COMPARISON: Chest CT from 6/16/2018.    PROCEDURE:   CT of the Chest was performed without intravenous contrast.  Sagittal and coronal reformats were performed.      FINDINGS:    CHEST:     LUNGS AND LARGE AIRWAYS: Patent central airways.  There are patchy and   nodular groundglass opacities in the rightupper and lower lobes. There   are tree-in-bud opacities predominantly in the left lower lobe.  PLEURA: No pleural effusion.  VESSELS: Coronary artery calcifications.  HEART: Heart size is normal. No pericardial effusion.  MEDIASTINUM AND IBRAHIMA: No lymphadenopathy.  CHEST WALL AND LOWER NECK: Within normal limits.  VISUALIZED UPPER ABDOMEN: Within normal limits.  BONES: Within normal limits.    IMPRESSION: Groundglass and tree-in-bud opacities may be due to   infection. Recommend follow-up to resolution.    < end of copied text >    Venous Dopplers: LE:  n/a   CT Abdomen  Others  n/a

## 2018-11-24 NOTE — PROGRESS NOTE ADULT - PROBLEM SELECTOR PLAN 4
high blood glucose likely due to steroids: Cont to control:  11/23: his blood glucoses are running high ,secondary to steroids  11/24 not optimized. continue FS with sliding scale. high blood glucose likely due to steroids: Cont to control:  11/23: his blood glucoses are running high ,secondary to steroids  11/24 not optimized. continue FS with sliding scale.Still prety higg

## 2018-11-25 LAB
ANION GAP SERPL CALC-SCNC: 15 MMOL/L — SIGNIFICANT CHANGE UP (ref 5–17)
BUN SERPL-MCNC: 21 MG/DL — SIGNIFICANT CHANGE UP (ref 7–23)
CALCIUM SERPL-MCNC: 9.1 MG/DL — SIGNIFICANT CHANGE UP (ref 8.4–10.5)
CHLORIDE SERPL-SCNC: 94 MMOL/L — LOW (ref 96–108)
CO2 SERPL-SCNC: 24 MMOL/L — SIGNIFICANT CHANGE UP (ref 22–31)
CREAT SERPL-MCNC: 0.81 MG/DL — SIGNIFICANT CHANGE UP (ref 0.5–1.3)
CULTURE RESULTS: SIGNIFICANT CHANGE UP
CULTURE RESULTS: SIGNIFICANT CHANGE UP
GLUCOSE BLDC GLUCOMTR-MCNC: 296 MG/DL — HIGH (ref 70–99)
GLUCOSE BLDC GLUCOMTR-MCNC: 299 MG/DL — HIGH (ref 70–99)
GLUCOSE BLDC GLUCOMTR-MCNC: 353 MG/DL — HIGH (ref 70–99)
GLUCOSE BLDC GLUCOMTR-MCNC: 379 MG/DL — HIGH (ref 70–99)
GLUCOSE SERPL-MCNC: 333 MG/DL — HIGH (ref 70–99)
HCT VFR BLD CALC: 36.7 % — LOW (ref 39–50)
HGB BLD-MCNC: 12.1 G/DL — LOW (ref 13–17)
MCHC RBC-ENTMCNC: 26.2 PG — LOW (ref 27–34)
MCHC RBC-ENTMCNC: 33 GM/DL — SIGNIFICANT CHANGE UP (ref 32–36)
MCV RBC AUTO: 79.6 FL — LOW (ref 80–100)
PLATELET # BLD AUTO: 370 K/UL — SIGNIFICANT CHANGE UP (ref 150–400)
POTASSIUM SERPL-MCNC: 4.4 MMOL/L — SIGNIFICANT CHANGE UP (ref 3.5–5.3)
POTASSIUM SERPL-SCNC: 4.4 MMOL/L — SIGNIFICANT CHANGE UP (ref 3.5–5.3)
RBC # BLD: 4.61 M/UL — SIGNIFICANT CHANGE UP (ref 4.2–5.8)
RBC # FLD: 13.2 % — SIGNIFICANT CHANGE UP (ref 10.3–14.5)
SODIUM SERPL-SCNC: 133 MMOL/L — LOW (ref 135–145)
SPECIMEN SOURCE: SIGNIFICANT CHANGE UP
SPECIMEN SOURCE: SIGNIFICANT CHANGE UP
WBC # BLD: 11.49 K/UL — HIGH (ref 3.8–10.5)
WBC # FLD AUTO: 11.49 K/UL — HIGH (ref 3.8–10.5)

## 2018-11-25 RX ORDER — SENNA PLUS 8.6 MG/1
2 TABLET ORAL AT BEDTIME
Qty: 0 | Refills: 0 | Status: DISCONTINUED | OUTPATIENT
Start: 2018-11-25 | End: 2018-11-27

## 2018-11-25 RX ORDER — IPRATROPIUM/ALBUTEROL SULFATE 18-103MCG
3 AEROSOL WITH ADAPTER (GRAM) INHALATION ONCE
Qty: 0 | Refills: 0 | Status: COMPLETED | OUTPATIENT
Start: 2018-11-25 | End: 2018-11-25

## 2018-11-25 RX ADMIN — CEFTRIAXONE 100 GRAM(S): 500 INJECTION, POWDER, FOR SOLUTION INTRAMUSCULAR; INTRAVENOUS at 21:37

## 2018-11-25 RX ADMIN — SENNA PLUS 2 TABLET(S): 8.6 TABLET ORAL at 21:36

## 2018-11-25 RX ADMIN — Medication 20 MILLIGRAM(S): at 13:08

## 2018-11-25 RX ADMIN — Medication 6: at 13:08

## 2018-11-25 RX ADMIN — Medication 3 MILLILITER(S): at 14:23

## 2018-11-25 RX ADMIN — AMLODIPINE BESYLATE 5 MILLIGRAM(S): 2.5 TABLET ORAL at 06:15

## 2018-11-25 RX ADMIN — MONTELUKAST 10 MILLIGRAM(S): 4 TABLET, CHEWABLE ORAL at 21:36

## 2018-11-25 RX ADMIN — BUDESONIDE AND FORMOTEROL FUMARATE DIHYDRATE 2 PUFF(S): 160; 4.5 AEROSOL RESPIRATORY (INHALATION) at 06:17

## 2018-11-25 RX ADMIN — ENOXAPARIN SODIUM 40 MILLIGRAM(S): 100 INJECTION SUBCUTANEOUS at 11:26

## 2018-11-25 RX ADMIN — Medication 20 MILLIGRAM(S): at 21:35

## 2018-11-25 RX ADMIN — Medication 100 MILLIGRAM(S): at 09:58

## 2018-11-25 RX ADMIN — Medication 3 MILLILITER(S): at 11:26

## 2018-11-25 RX ADMIN — Medication 6: at 17:42

## 2018-11-25 RX ADMIN — Medication 6 UNIT(S): at 17:43

## 2018-11-25 RX ADMIN — Medication 3 MILLILITER(S): at 06:14

## 2018-11-25 RX ADMIN — Medication 100 MILLIGRAM(S): at 11:26

## 2018-11-25 RX ADMIN — Medication 1200 MILLIGRAM(S): at 06:15

## 2018-11-25 RX ADMIN — BUDESONIDE AND FORMOTEROL FUMARATE DIHYDRATE 2 PUFF(S): 160; 4.5 AEROSOL RESPIRATORY (INHALATION) at 17:43

## 2018-11-25 RX ADMIN — Medication 6 UNIT(S): at 08:40

## 2018-11-25 RX ADMIN — Medication 3 MILLILITER(S): at 17:44

## 2018-11-25 RX ADMIN — Medication 1200 MILLIGRAM(S): at 17:43

## 2018-11-25 RX ADMIN — Medication 6 UNIT(S): at 13:08

## 2018-11-25 RX ADMIN — Medication 6: at 21:35

## 2018-11-25 RX ADMIN — Medication 20 MILLIGRAM(S): at 06:14

## 2018-11-25 RX ADMIN — Medication 10: at 08:40

## 2018-11-25 RX ADMIN — Medication 100 MILLIGRAM(S): at 21:37

## 2018-11-25 NOTE — PROGRESS NOTE ADULT - PROBLEM/PLAN-4
DISPLAY PLAN FREE TEXT Spine appears normal, range of motion is not limited, no muscle or joint tenderness

## 2018-11-25 NOTE — PROGRESS NOTE ADULT - PROBLEM SELECTOR PLAN 2
CT scan reviewed: Cont antibiotics  11/23: would need total of 7 days oaf antbitotics:  11/24 continue antibiotics Azithromycin and Rocephin D5/7  dc azithromycin in total of days and ceftriaxone in 7 days  11/25 Azithromycin D5/5, continue Ceftriaxone two more days. Today is 5/7

## 2018-11-25 NOTE — PROGRESS NOTE ADULT - SUBJECTIVE AND OBJECTIVE BOX
Patient is a 68y old  Male who presents with a chief complaint of shortness of breath (25 Nov 2018 08:05)    pt. seen and examined, doing better, wheezing improved     INTERVAL HPI/OVERNIGHT EVENTS:  T(C): 36.8 (11-25-18 @ 23:50), Max: 36.8 (11-25-18 @ 16:26)  HR: 88 (11-25-18 @ 23:50) (80 - 99)  BP: 138/72 (11-25-18 @ 23:50) (111/63 - 138/72)  RR: 18 (11-25-18 @ 23:50) (18 - 18)  SpO2: 95% (11-25-18 @ 23:50) (94% - 96%)  Wt(kg): --  I&O's Summary    24 Nov 2018 07:01  -  25 Nov 2018 07:00  --------------------------------------------------------  IN: 1035 mL / OUT: 550 mL / NET: 485 mL    25 Nov 2018 07:01  -  26 Nov 2018 03:37  --------------------------------------------------------  IN: 950 mL / OUT: 0 mL / NET: 950 mL        PAST MEDICAL & SURGICAL HISTORY:  COPD (chronic obstructive pulmonary disease)  HLD (hyperlipidemia)  DM (diabetes mellitus)  HTN (hypertension)  No significant past surgical history      SOCIAL HISTORY  Alcohol:  Tobacco:  Illicit substance use:    FAMILY HISTORY:    REVIEW OF SYSTEMS:  CONSTITUTIONAL: No fever, weight loss, or fatigue  EYES: No eye pain, visual disturbances, or discharge  ENMT:  No difficulty hearing, tinnitus, vertigo; No sinus or throat pain  NECK: No pain or stiffness  RESPIRATORY: No cough, wheezing, chills or hemoptysis; No shortness of breath  CARDIOVASCULAR: No chest pain, palpitations, dizziness, or leg swelling  GASTROINTESTINAL: No abdominal or epigastric pain. No nausea, vomiting, or hematemesis; No diarrhea or constipation. No melena or hematochezia.  GENITOURINARY: No dysuria, frequency, hematuria, or incontinence  NEUROLOGICAL: No headaches, memory loss, loss of strength, numbness, or tremors  SKIN: No itching, burning, rashes, or lesions   LYMPH NODES: No enlarged glands  ENDOCRINE: No heat or cold intolerance; No hair loss  MUSCULOSKELETAL: No joint pain or swelling; No muscle, back, or extremity pain  PSYCHIATRIC: No depression, anxiety, mood swings, or difficulty sleeping  HEME/LYMPH: No easy bruising, or bleeding gums  ALLERY AND IMMUNOLOGIC: No hives or eczema    RADIOLOGY & ADDITIONAL TESTS:    Imaging Personally Reviewed:  [ ] YES  [ ] NO    Consultant(s) Notes Reviewed:  [ ] YES  [ ] NO    PHYSICAL EXAM:  GENERAL: NAD, well-groomed, well-developed  HEAD:  Atraumatic, Normocephalic  EYES: EOMI, PERRLA, conjunctiva and sclera clear  ENMT: No tonsillar erythema, exudates, or enlargement; Moist mucous membranes, Good dentition, No lesions  NECK: Supple, No JVD, Normal thyroid  NERVOUS SYSTEM:  Alert & Oriented X3, Good concentration; Motor Strength 5/5 B/L upper and lower extremities; DTRs 2+ intact and symmetric  CHEST/LUNG: Clear to percussion bilaterally; No rales, rhonchi, wheezing, or rubs  HEART: Regular rate and rhythm; No murmurs, rubs, or gallops  ABDOMEN: Soft, Nontender, Nondistended; Bowel sounds present  EXTREMITIES:  2+ Peripheral Pulses, No clubbing, cyanosis, or edema  LYMPH: No lymphadenopathy noted  SKIN: No rashes or lesions    LABS:                        12.1   11.49 )-----------( 370      ( 25 Nov 2018 08:24 )             36.7     11-25    133<L>  |  94<L>  |  21  ----------------------------<  333<H>  4.4   |  24  |  0.81    Ca    9.1      25 Nov 2018 07:20          CAPILLARY BLOOD GLUCOSE      POCT Blood Glucose.: 379 mg/dL (25 Nov 2018 21:18)  POCT Blood Glucose.: 296 mg/dL (25 Nov 2018 17:13)  POCT Blood Glucose.: 299 mg/dL (25 Nov 2018 12:33)  POCT Blood Glucose.: 353 mg/dL (25 Nov 2018 08:22)            MEDICATIONS  (STANDING):  ALBUTerol/ipratropium for Nebulization 3 milliLiter(s) Nebulizer every 6 hours  amLODIPine   Tablet 5 milliGRAM(s) Oral daily  buDESOnide 160 MICROgram(s)/formoterol 4.5 MICROgram(s) Inhaler 2 Puff(s) Inhalation two times a day  cefTRIAXone   IVPB 1 Gram(s) IV Intermittent <User Schedule>  dextrose 5%. 1000 milliLiter(s) (50 mL/Hr) IV Continuous <Continuous>  dextrose 50% Injectable 12.5 Gram(s) IV Push once  enoxaparin Injectable 40 milliGRAM(s) SubCutaneous every 24 hours  guaiFENesin ER 1200 milliGRAM(s) Oral every 12 hours  influenza   Vaccine 0.5 milliLiter(s) IntraMuscular once  insulin lispro (HumaLOG) corrective regimen sliding scale   SubCutaneous three times a day before meals  insulin lispro (HumaLOG) corrective regimen sliding scale   SubCutaneous at bedtime  insulin lispro Injectable (HumaLOG) 6 Unit(s) SubCutaneous before breakfast  insulin lispro Injectable (HumaLOG) 6 Unit(s) SubCutaneous before lunch  insulin lispro Injectable (HumaLOG) 6 Unit(s) SubCutaneous before dinner  methylPREDNISolone sodium succinate Injectable 20 milliGRAM(s) IV Push every 8 hours  montelukast 10 milliGRAM(s) Oral at bedtime  senna 2 Tablet(s) Oral at bedtime    MEDICATIONS  (PRN):  benzonatate 100 milliGRAM(s) Oral every 8 hours PRN Cough  dextrose 40% Gel 15 Gram(s) Oral once PRN Blood Glucose LESS THAN 70 milliGRAM(s)/deciliter  glucagon  Injectable 1 milliGRAM(s) IntraMuscular once PRN Glucose LESS THAN 70 milligrams/deciliter  guaiFENesin   Syrup  (Sugar-Free) 100 milliGRAM(s) Oral every 6 hours PRN Cough  HYDROcodone/homatropine Syrup 5 milliLiter(s) Oral every 6 hours PRN mild pain      Care Discussed with Consultants/Other Providers [ ] YES  [ ] NO

## 2018-11-25 NOTE — PROGRESS NOTE ADULT - PROBLEM SELECTOR PLAN 1
Still with significant wheezing: Cont IV steroids  11/23: cont IV steroids today too: He is still significantly wheezing: Had coughing episode in AM and can give hycodan in night prn for increased cough: But his cough is secondary to bronchospasm: Cont steroids  11/24 wheezing without acute respiratory distress. Continue Duoneb, Symbicort, Singulair as Pt feels symptoms are getting better: Cont steroids  11/25 coarse wheezing without acute distress. on room air. Continue Duoneb, Symbicort, Singulair, IV steroid. Outpatient PFT

## 2018-11-25 NOTE — PROGRESS NOTE ADULT - PROBLEM SELECTOR PLAN 4
high blood glucose likely due to steroids: Cont to control:  11/23: his blood glucoses are running high ,secondary to steroids  11/24 not optimized. continue FS with sliding scale.Still prety higg  11/25 running high due to steroids but no anion gap. tight glycemic control recommended while Pt is on steroid

## 2018-11-25 NOTE — PROGRESS NOTE ADULT - SUBJECTIVE AND OBJECTIVE BOX
Patient is a 68y old  Male who presents with a chief complaint of shortness of breath (24 Nov 2018 13:19)    Any change in ROS: SOB (+), cough (+)     MEDICATIONS  (STANDING):  ALBUTerol/ipratropium for Nebulization 3 milliLiter(s) Nebulizer every 6 hours  amLODIPine   Tablet 5 milliGRAM(s) Oral daily  azithromycin  IVPB 500 milliGRAM(s) IV Intermittent <User Schedule>  buDESOnide 160 MICROgram(s)/formoterol 4.5 MICROgram(s) Inhaler 2 Puff(s) Inhalation two times a day  cefTRIAXone   IVPB 1 Gram(s) IV Intermittent <User Schedule>  dextrose 5%. 1000 milliLiter(s) (50 mL/Hr) IV Continuous <Continuous>  dextrose 50% Injectable 12.5 Gram(s) IV Push once  enoxaparin Injectable 40 milliGRAM(s) SubCutaneous every 24 hours  guaiFENesin ER 1200 milliGRAM(s) Oral every 12 hours  influenza   Vaccine 0.5 milliLiter(s) IntraMuscular once  insulin lispro (HumaLOG) corrective regimen sliding scale   SubCutaneous three times a day before meals  insulin lispro (HumaLOG) corrective regimen sliding scale   SubCutaneous at bedtime  insulin lispro Injectable (HumaLOG) 6 Unit(s) SubCutaneous before breakfast  insulin lispro Injectable (HumaLOG) 6 Unit(s) SubCutaneous before lunch  insulin lispro Injectable (HumaLOG) 6 Unit(s) SubCutaneous before dinner  methylPREDNISolone sodium succinate Injectable 20 milliGRAM(s) IV Push every 8 hours  montelukast 10 milliGRAM(s) Oral at bedtime    MEDICATIONS  (PRN):  benzonatate 100 milliGRAM(s) Oral every 8 hours PRN Cough  dextrose 40% Gel 15 Gram(s) Oral once PRN Blood Glucose LESS THAN 70 milliGRAM(s)/deciliter  glucagon  Injectable 1 milliGRAM(s) IntraMuscular once PRN Glucose LESS THAN 70 milligrams/deciliter  guaiFENesin   Syrup  (Sugar-Free) 100 milliGRAM(s) Oral every 6 hours PRN Cough  HYDROcodone/homatropine Syrup 5 milliLiter(s) Oral every 6 hours PRN mild pain    Vital Signs Last 24 Hrs  T(C): 36.6 (25 Nov 2018 06:12), Max: 36.8 (24 Nov 2018 08:22)  T(F): 97.8 (25 Nov 2018 06:12), Max: 98.3 (24 Nov 2018 08:22)  HR: 82 (25 Nov 2018 06:12) (79 - 92)  BP: 120/70 (25 Nov 2018 06:12) (115/70 - 138/76)  BP(mean): --  RR: 18 (25 Nov 2018 06:12) (18 - 18)  SpO2: 94% (25 Nov 2018 06:12) (94% - 98%)    I&O's Summary    24 Nov 2018 07:01  -  25 Nov 2018 07:00  --------------------------------------------------------  IN: 1035 mL / OUT: 550 mL / NET: 485 mL      Physical Exam:   GENERAL: The patient comfortable with no apparent distress.   HEENT: Head is normocephalic and atraumatic.    NECK: Supple with no elevated JVP.  LUNGS: Coarse wheezing  HEART: S1 and S2 present without murmur.  ABDOMEN: Soft, nontender, and nondistended. No hepatosplenomegaly is noted.  EXTREMITIES: No edema or calf tenderness.  NEUROLOGIC: Grossly intact.    Labs:  26                            10.8   12.40 )-----------( 341      ( 24 Nov 2018 10:18 )             33.2                         11.4   14.9  )-----------( 378      ( 23 Nov 2018 14:54 )             34.2     11-25    133<L>  |  94<L>  |  21  ----------------------------<  333<H>  4.4   |  24  |  0.81  11-24    137  |  97  |  23  ----------------------------<  247<H>  3.4<L>   |  25  |  0.93  11-23    132<L>  |  94<L>  |  28<H>  ----------------------------<  419<H>  4.3   |  22  |  1.05    Ca    9.1      25 Nov 2018 07:20  Ca    8.8      24 Nov 2018 07:49  Ca    9.1      23 Nov 2018 14:54      CAPILLARY BLOOD GLUCOSE      POCT Blood Glucose.: 373 mg/dL (24 Nov 2018 21:44)  POCT Blood Glucose.: 481 mg/dL (24 Nov 2018 18:23)  POCT Blood Glucose.: 439 mg/dL (24 Nov 2018 16:51)  POCT Blood Glucose.: 450 mg/dL (24 Nov 2018 16:50)  POCT Blood Glucose.: 233 mg/dL (24 Nov 2018 12:32)  POCT Blood Glucose.: 205 mg/dL (24 Nov 2018 08:21)      Studies  Chest X-RAY < from: Xray Chest 1 View- PORTABLE-Urgent (11.19.18 @ 20:09) >    EXAM:  XR CHEST PORTABLE URGENT 1V                            PROCEDURE DATE:  11/19/2018            INTERPRETATION:  CLINICAL INFORMATION: Chest pain, cough.    TECHNIQUE: Single frontal radiograph of the chest dated 11/19/2018 8:09   PM.    COMPARISON: Chest radiograph dated 6/16/2018.    FINDINGS:  The lungs are clear.  No pleural effusions or pneumothorax.  Heart size is within normal limits.   No acute osseous abnormality.       IMPRESSION: Clear lungs.    < end of copied text >    CT SCAN Chest   < from: CT Chest No Cont (11.19.18 @ 21:10) >  EXAM:  CT CHEST                            PROCEDURE DATE:  11/19/2018            INTERPRETATION:  CLINICAL INFORMATION: Shortness of breath. Fever.   Evaluate for interstitial lung disease.    COMPARISON: Chest CT from 6/16/2018.    PROCEDURE:   CT of the Chest was performed without intravenous contrast.  Sagittal and coronal reformats were performed.      FINDINGS:    CHEST:     LUNGS AND LARGE AIRWAYS: Patent central airways.  There are patchy and   nodular groundglass opacities in the rightupper and lower lobes. There   are tree-in-bud opacities predominantly in the left lower lobe.  PLEURA: No pleural effusion.  VESSELS: Coronary artery calcifications.  HEART: Heart size is normal. No pericardial effusion.  MEDIASTINUM AND IBRAHIMA: No lymphadenopathy.  CHEST WALL AND LOWER NECK: Within normal limits.  VISUALIZED UPPER ABDOMEN: Within normal limits.  BONES: Within normal limits.    IMPRESSION: Groundglass and tree-in-bud opacities may be due to   infection. Recommend follow-up to resolution.    < end of copied text >    Venous Dopplers: LE: n/a   CT Abdomen  n/a   Others  no echo

## 2018-11-26 LAB
ANION GAP SERPL CALC-SCNC: 13 MMOL/L — SIGNIFICANT CHANGE UP (ref 5–17)
BUN SERPL-MCNC: 25 MG/DL — HIGH (ref 7–23)
CALCIUM SERPL-MCNC: 8.9 MG/DL — SIGNIFICANT CHANGE UP (ref 8.4–10.5)
CHLORIDE SERPL-SCNC: 97 MMOL/L — SIGNIFICANT CHANGE UP (ref 96–108)
CO2 SERPL-SCNC: 24 MMOL/L — SIGNIFICANT CHANGE UP (ref 22–31)
CREAT SERPL-MCNC: 0.84 MG/DL — SIGNIFICANT CHANGE UP (ref 0.5–1.3)
GLUCOSE BLDC GLUCOMTR-MCNC: 259 MG/DL — HIGH (ref 70–99)
GLUCOSE BLDC GLUCOMTR-MCNC: 281 MG/DL — HIGH (ref 70–99)
GLUCOSE BLDC GLUCOMTR-MCNC: 307 MG/DL — HIGH (ref 70–99)
GLUCOSE BLDC GLUCOMTR-MCNC: 410 MG/DL — HIGH (ref 70–99)
GLUCOSE SERPL-MCNC: 331 MG/DL — HIGH (ref 70–99)
HCT VFR BLD CALC: 34.4 % — LOW (ref 39–50)
HGB BLD-MCNC: 11.4 G/DL — LOW (ref 13–17)
MCHC RBC-ENTMCNC: 25.9 PG — LOW (ref 27–34)
MCHC RBC-ENTMCNC: 33.1 GM/DL — SIGNIFICANT CHANGE UP (ref 32–36)
MCV RBC AUTO: 78.2 FL — LOW (ref 80–100)
PLATELET # BLD AUTO: 363 K/UL — SIGNIFICANT CHANGE UP (ref 150–400)
POTASSIUM SERPL-MCNC: 4.6 MMOL/L — SIGNIFICANT CHANGE UP (ref 3.5–5.3)
POTASSIUM SERPL-SCNC: 4.6 MMOL/L — SIGNIFICANT CHANGE UP (ref 3.5–5.3)
RBC # BLD: 4.4 M/UL — SIGNIFICANT CHANGE UP (ref 4.2–5.8)
RBC # FLD: 13.3 % — SIGNIFICANT CHANGE UP (ref 10.3–14.5)
SODIUM SERPL-SCNC: 134 MMOL/L — LOW (ref 135–145)
WBC # BLD: 15.57 K/UL — HIGH (ref 3.8–10.5)
WBC # FLD AUTO: 15.57 K/UL — HIGH (ref 3.8–10.5)

## 2018-11-26 RX ORDER — CEFUROXIME AXETIL 250 MG
500 TABLET ORAL EVERY 12 HOURS
Qty: 0 | Refills: 0 | Status: DISCONTINUED | OUTPATIENT
Start: 2018-11-26 | End: 2018-11-27

## 2018-11-26 RX ADMIN — BUDESONIDE AND FORMOTEROL FUMARATE DIHYDRATE 2 PUFF(S): 160; 4.5 AEROSOL RESPIRATORY (INHALATION) at 17:54

## 2018-11-26 RX ADMIN — Medication 8: at 21:25

## 2018-11-26 RX ADMIN — ENOXAPARIN SODIUM 40 MILLIGRAM(S): 100 INJECTION SUBCUTANEOUS at 12:28

## 2018-11-26 RX ADMIN — Medication 6 UNIT(S): at 17:54

## 2018-11-26 RX ADMIN — AMLODIPINE BESYLATE 5 MILLIGRAM(S): 2.5 TABLET ORAL at 05:58

## 2018-11-26 RX ADMIN — Medication 1200 MILLIGRAM(S): at 05:58

## 2018-11-26 RX ADMIN — Medication 100 MILLIGRAM(S): at 21:34

## 2018-11-26 RX ADMIN — Medication 8: at 08:55

## 2018-11-26 RX ADMIN — Medication 20 MILLIGRAM(S): at 05:58

## 2018-11-26 RX ADMIN — BUDESONIDE AND FORMOTEROL FUMARATE DIHYDRATE 2 PUFF(S): 160; 4.5 AEROSOL RESPIRATORY (INHALATION) at 05:58

## 2018-11-26 RX ADMIN — Medication 6: at 17:54

## 2018-11-26 RX ADMIN — Medication 6 UNIT(S): at 12:27

## 2018-11-26 RX ADMIN — SENNA PLUS 2 TABLET(S): 8.6 TABLET ORAL at 21:27

## 2018-11-26 RX ADMIN — Medication 40 MILLIGRAM(S): at 12:27

## 2018-11-26 RX ADMIN — Medication 6 UNIT(S): at 08:56

## 2018-11-26 RX ADMIN — Medication 3 MILLILITER(S): at 05:58

## 2018-11-26 RX ADMIN — Medication 500 MILLIGRAM(S): at 17:54

## 2018-11-26 RX ADMIN — Medication 6: at 12:27

## 2018-11-26 RX ADMIN — Medication 3 MILLILITER(S): at 12:28

## 2018-11-26 RX ADMIN — Medication 3 MILLILITER(S): at 17:55

## 2018-11-26 RX ADMIN — MONTELUKAST 10 MILLIGRAM(S): 4 TABLET, CHEWABLE ORAL at 21:27

## 2018-11-26 NOTE — PROGRESS NOTE ADULT - SUBJECTIVE AND OBJECTIVE BOX
Patient is a 68y old  Male who presents with a chief complaint of shortness of breath (26 Nov 2018 10:05)    pt. seen and examined, feeling much better    INTERVAL HPI/OVERNIGHT EVENTS:  T(C): 36.5 (11-27-18 @ 00:33), Max: 36.8 (11-26-18 @ 16:44)  HR: 91 (11-27-18 @ 00:33) (78 - 95)  BP: 135/78 (11-27-18 @ 00:33) (129/78 - 135/78)  RR: 18 (11-27-18 @ 00:33) (18 - 20)  SpO2: 94% (11-27-18 @ 00:33) (94% - 96%)  Wt(kg): --  I&O's Summary    25 Nov 2018 07:01  -  26 Nov 2018 07:00  --------------------------------------------------------  IN: 950 mL / OUT: 0 mL / NET: 950 mL        PAST MEDICAL & SURGICAL HISTORY:  COPD (chronic obstructive pulmonary disease)  HLD (hyperlipidemia)  DM (diabetes mellitus)  HTN (hypertension)  No significant past surgical history      SOCIAL HISTORY  Alcohol:  Tobacco:  Illicit substance use:    FAMILY HISTORY:    REVIEW OF SYSTEMS:  CONSTITUTIONAL: No fever, weight loss, or fatigue  EYES: No eye pain, visual disturbances, or discharge  ENMT:  No difficulty hearing, tinnitus, vertigo; No sinus or throat pain  NECK: No pain or stiffness  RESPIRATORY: No cough, wheezing, chills or hemoptysis; No shortness of breath  CARDIOVASCULAR: No chest pain, palpitations, dizziness, or leg swelling  GASTROINTESTINAL: No abdominal or epigastric pain. No nausea, vomiting, or hematemesis; No diarrhea or constipation. No melena or hematochezia.  GENITOURINARY: No dysuria, frequency, hematuria, or incontinence  NEUROLOGICAL: No headaches, memory loss, loss of strength, numbness, or tremors  SKIN: No itching, burning, rashes, or lesions   LYMPH NODES: No enlarged glands  ENDOCRINE: No heat or cold intolerance; No hair loss  MUSCULOSKELETAL: No joint pain or swelling; No muscle, back, or extremity pain  PSYCHIATRIC: No depression, anxiety, mood swings, or difficulty sleeping  HEME/LYMPH: No easy bruising, or bleeding gums  ALLERY AND IMMUNOLOGIC: No hives or eczema    RADIOLOGY & ADDITIONAL TESTS:    Imaging Personally Reviewed:  [ ] YES  [ ] NO    Consultant(s) Notes Reviewed:  [ ] YES  [ ] NO    PHYSICAL EXAM:  GENERAL: NAD, well-groomed, well-developed  HEAD:  Atraumatic, Normocephalic  EYES: EOMI, PERRLA, conjunctiva and sclera clear  ENMT: No tonsillar erythema, exudates, or enlargement; Moist mucous membranes, Good dentition, No lesions  NECK: Supple, No JVD, Normal thyroid  NERVOUS SYSTEM:  Alert & Oriented X3, Good concentration; Motor Strength 5/5 B/L upper and lower extremities; DTRs 2+ intact and symmetric  CHEST/LUNG: Clear to percussion bilaterally; No rales, rhonchi, wheezing, or rubs  HEART: Regular rate and rhythm; No murmurs, rubs, or gallops  ABDOMEN: Soft, Nontender, Nondistended; Bowel sounds present  EXTREMITIES:  2+ Peripheral Pulses, No clubbing, cyanosis, or edema  LYMPH: No lymphadenopathy noted  SKIN: No rashes or lesions    LABS:                        11.4   15.57 )-----------( 363      ( 26 Nov 2018 07:28 )             34.4     11-26    134<L>  |  97  |  25<H>  ----------------------------<  331<H>  4.6   |  24  |  0.84    Ca    8.9      26 Nov 2018 06:15          CAPILLARY BLOOD GLUCOSE      POCT Blood Glucose.: 199 mg/dL (27 Nov 2018 00:23)  POCT Blood Glucose.: 410 mg/dL (26 Nov 2018 21:17)  POCT Blood Glucose.: 259 mg/dL (26 Nov 2018 17:01)  POCT Blood Glucose.: 281 mg/dL (26 Nov 2018 12:15)  POCT Blood Glucose.: 307 mg/dL (26 Nov 2018 08:36)            MEDICATIONS  (STANDING):  ALBUTerol/ipratropium for Nebulization 3 milliLiter(s) Nebulizer every 6 hours  amLODIPine   Tablet 5 milliGRAM(s) Oral daily  buDESOnide 160 MICROgram(s)/formoterol 4.5 MICROgram(s) Inhaler 2 Puff(s) Inhalation two times a day  cefuroxime   Tablet 500 milliGRAM(s) Oral every 12 hours  dextrose 5%. 1000 milliLiter(s) (50 mL/Hr) IV Continuous <Continuous>  dextrose 50% Injectable 12.5 Gram(s) IV Push once  enoxaparin Injectable 40 milliGRAM(s) SubCutaneous every 24 hours  guaiFENesin ER 1200 milliGRAM(s) Oral every 12 hours  influenza   Vaccine 0.5 milliLiter(s) IntraMuscular once  insulin lispro (HumaLOG) corrective regimen sliding scale   SubCutaneous three times a day before meals  insulin lispro (HumaLOG) corrective regimen sliding scale   SubCutaneous at bedtime  insulin lispro Injectable (HumaLOG) 6 Unit(s) SubCutaneous before breakfast  insulin lispro Injectable (HumaLOG) 6 Unit(s) SubCutaneous before lunch  insulin lispro Injectable (HumaLOG) 6 Unit(s) SubCutaneous before dinner  montelukast 10 milliGRAM(s) Oral at bedtime  predniSONE   Tablet   Oral   predniSONE   Tablet 40 milliGRAM(s) Oral daily  senna 2 Tablet(s) Oral at bedtime    MEDICATIONS  (PRN):  benzonatate 100 milliGRAM(s) Oral every 8 hours PRN Cough  dextrose 40% Gel 15 Gram(s) Oral once PRN Blood Glucose LESS THAN 70 milliGRAM(s)/deciliter  glucagon  Injectable 1 milliGRAM(s) IntraMuscular once PRN Glucose LESS THAN 70 milligrams/deciliter  guaiFENesin   Syrup  (Sugar-Free) 100 milliGRAM(s) Oral every 6 hours PRN Cough  HYDROcodone/homatropine Syrup 5 milliLiter(s) Oral every 6 hours PRN mild pain      Care Discussed with Consultants/Other Providers [ ] YES  [ ] NO

## 2018-11-26 NOTE — PROGRESS NOTE ADULT - PROBLEM SELECTOR PLAN 2
CT scan reviewed: Cont antibiotics  11/23: would need total of 7 days oaf antbitotics:  11/24 continue antibiotics Azithromycin and Rocephin D5/7  dc azithromycin in total of days and ceftriaxone in 7 days  11/25 Azithromycin D5/5, continue Ceftriaxone two more days. Today is 5/7 11/26: MEKA jay has received 7 days of ceftriaxone as well as 5 dyas of Azithromycin: Would change to oral antibiotics for a few days more: He would need outpt follow up within 2 weeks following dc

## 2018-11-26 NOTE — PROGRESS NOTE ADULT - PROBLEM SELECTOR PLAN 1
Still with significant wheezing: Cont IV steroids  11/23: cont IV steroids today too: He is still significantly wheezing: Had coughing episode in AM and can give hycodan in night prn for increased cough: But his cough is secondary to bronchospasm: Cont steroids  11/24 wheezing without acute respiratory distress. Continue Duoneb, Symbicort, Singulair as Pt feels symptoms are getting better: Cont steroids  11/25 coarse wheezing without acute distress. on room air. Continue Duoneb, Symbicort, Singulair, IV steroid. Outpatient PFT  11/26: change to oral antibiotics as well as oral tapering prednisone: Pt is still wheezing : But he feels much better:

## 2018-11-26 NOTE — PROGRESS NOTE ADULT - SUBJECTIVE AND OBJECTIVE BOX
Patient is a 68y old  Male who presents with a chief complaint of shortness of breath (25 Nov 2018 21:37)      Any change in ROS: Feeling much better:     MEDICATIONS  (STANDING):  ALBUTerol/ipratropium for Nebulization 3 milliLiter(s) Nebulizer every 6 hours  amLODIPine   Tablet 5 milliGRAM(s) Oral daily  buDESOnide 160 MICROgram(s)/formoterol 4.5 MICROgram(s) Inhaler 2 Puff(s) Inhalation two times a day  cefTRIAXone   IVPB 1 Gram(s) IV Intermittent <User Schedule>  dextrose 5%. 1000 milliLiter(s) (50 mL/Hr) IV Continuous <Continuous>  dextrose 50% Injectable 12.5 Gram(s) IV Push once  enoxaparin Injectable 40 milliGRAM(s) SubCutaneous every 24 hours  guaiFENesin ER 1200 milliGRAM(s) Oral every 12 hours  influenza   Vaccine 0.5 milliLiter(s) IntraMuscular once  insulin lispro (HumaLOG) corrective regimen sliding scale   SubCutaneous three times a day before meals  insulin lispro (HumaLOG) corrective regimen sliding scale   SubCutaneous at bedtime  insulin lispro Injectable (HumaLOG) 6 Unit(s) SubCutaneous before breakfast  insulin lispro Injectable (HumaLOG) 6 Unit(s) SubCutaneous before lunch  insulin lispro Injectable (HumaLOG) 6 Unit(s) SubCutaneous before dinner  montelukast 10 milliGRAM(s) Oral at bedtime  senna 2 Tablet(s) Oral at bedtime    MEDICATIONS  (PRN):  benzonatate 100 milliGRAM(s) Oral every 8 hours PRN Cough  dextrose 40% Gel 15 Gram(s) Oral once PRN Blood Glucose LESS THAN 70 milliGRAM(s)/deciliter  glucagon  Injectable 1 milliGRAM(s) IntraMuscular once PRN Glucose LESS THAN 70 milligrams/deciliter  guaiFENesin   Syrup  (Sugar-Free) 100 milliGRAM(s) Oral every 6 hours PRN Cough  HYDROcodone/homatropine Syrup 5 milliLiter(s) Oral every 6 hours PRN mild pain    Vital Signs Last 24 Hrs  T(C): 36.6 (26 Nov 2018 08:00), Max: 36.8 (25 Nov 2018 16:26)  T(F): 97.8 (26 Nov 2018 08:00), Max: 98.3 (25 Nov 2018 16:26)  HR: 89 (26 Nov 2018 08:00) (78 - 99)  BP: 130/74 (26 Nov 2018 08:00) (111/63 - 138/72)  BP(mean): --  RR: 18 (26 Nov 2018 08:00) (18 - 18)  SpO2: 94% (26 Nov 2018 08:00) (94% - 96%)    I&O's Summary    25 Nov 2018 07:01  -  26 Nov 2018 07:00  --------------------------------------------------------  IN: 950 mL / OUT: 0 mL / NET: 950 mL          Physical Exam:   GENERAL: NAD, well-groomed, well-developed  HEENT: SEAN/   Atraumatic, Normocephalic  ENMT: No tonsillar erythema, exudates, or enlargement; Moist mucous membranes, Good dentition, No lesions  NECK: Supple, No JVD, Normal thyroid  CHEST/LUNG: wheeze+  CVS: Regular rate and rhythm; No murmurs, rubs, or gallops  GI: : Soft, Nontender, Nondistended; Bowel sounds present  NERVOUS SYSTEM:  Alert & Oriented X3  EXTREMITIES:  2+ Peripheral Pulses, No clubbing, cyanosis, or edema  LYMPH: No lymphadenopathy noted  SKIN: No rashes or lesions  ENDOCRINOLOGY: No Thyromegaly  PSYCH: Appropriate    Labs:  26                            11.4   15.57 )-----------( 363      ( 26 Nov 2018 07:28 )             34.4                         12.1   11.49 )-----------( 370      ( 25 Nov 2018 08:24 )             36.7                         10.8   12.40 )-----------( 341      ( 24 Nov 2018 10:18 )             33.2                         11.4   14.9  )-----------( 378      ( 23 Nov 2018 14:54 )             34.2     11-26    134<L>  |  97  |  25<H>  ----------------------------<  331<H>  4.6   |  24  |  0.84  11-25    133<L>  |  94<L>  |  21  ----------------------------<  333<H>  4.4   |  24  |  0.81  11-24    137  |  97  |  23  ----------------------------<  247<H>  3.4<L>   |  25  |  0.93  11-23    132<L>  |  94<L>  |  28<H>  ----------------------------<  419<H>  4.3   |  22  |  1.05    Ca    8.9      26 Nov 2018 06:15  Ca    9.1      25 Nov 2018 07:20      CAPILLARY BLOOD GLUCOSE      POCT Blood Glucose.: 307 mg/dL (26 Nov 2018 08:36)  POCT Blood Glucose.: 379 mg/dL (25 Nov 2018 21:18)  POCT Blood Glucose.: 296 mg/dL (25 Nov 2018 17:13)  POCT Blood Glucose.: 299 mg/dL (25 Nov 2018 12:33)                  RECENT CULTURES:  11-20 @ 15:45 .Blood Blood       < from: CT Chest No Cont (11.19.18 @ 21:10) >      PROCEDURE DATE:  11/19/2018            INTERPRETATION:  CLINICAL INFORMATION: Shortness of breath. Fever.   Evaluate for interstitial lung disease.    COMPARISON: Chest CT from 6/16/2018.    PROCEDURE:   CT of the Chest was performed without intravenous contrast.  Sagittal and coronal reformats were performed.      FINDINGS:    CHEST:     LUNGS AND LARGE AIRWAYS: Patent central airways.  There are patchy and   nodular groundglass opacities in the rightupper and lower lobes. There   are tree-in-bud opacities predominantly in the left lower lobe.  PLEURA: No pleural effusion.  VESSELS: Coronary artery calcifications.  HEART: Heart size is normal. No pericardial effusion.  MEDIASTINUM AND IBRAHIMA: No lymphadenopathy.  CHEST WALL AND LOWER NECK: Within normal limits.  VISUALIZED UPPER ABDOMEN: Within normal limits.  BONES: Within normal limits.    IMPRESSION: Groundglass and tree-in-bud opacities may be due to   infection. Recommend follow-up to resolution.                    ANTONIA DEAN M.D., RADIOLOGY RESIDENT  This document has been electronically signed.  LARRY REED M.D., ATTENDING RADIOLOGIST  This document has been electronically signed. Nov 19 2018 10:48PM              < end of copied text >           No growth at 5 days.    11-20 @ 13:51 .Blood Blood                No growth at 5 days.          RESPIRATORY CULTURES:          Studies  Chest X-RAY  CT SCAN Chest   Venous Dopplers: LE:   CT Abdomen  Others

## 2018-11-26 NOTE — PROGRESS NOTE ADULT - PROBLEM SELECTOR PLAN 3
Supportive care  11/24 supportive care  11/25 supportive care  11/26: resolving: but wheezing is still persisting!

## 2018-11-26 NOTE — PROGRESS NOTE ADULT - PROBLEM SELECTOR PLAN 6
Controlled  11/24 stable continue home meds  11/25 stable. continue current meds  11/26: Controlled!

## 2018-11-26 NOTE — PROGRESS NOTE ADULT - PROBLEM SELECTOR PLAN 4
high blood glucose likely due to steroids: Cont to control:  11/23: his blood glucoses are running high ,secondary to steroids  11/24 not optimized. continue FS with sliding scale.Still pretty high  11/25 running high due to steroids but no anion gap. tight glycemic control recommended while Pt is on steroid  11/26: still elevated: secondary to pneumonia as well as steroids: Cont current steroids taper oral:

## 2018-11-27 ENCOUNTER — TRANSCRIPTION ENCOUNTER (OUTPATIENT)
Age: 68
End: 2018-11-27

## 2018-11-27 VITALS
HEART RATE: 103 BPM | RESPIRATION RATE: 17 BRPM | SYSTOLIC BLOOD PRESSURE: 130 MMHG | OXYGEN SATURATION: 96 % | DIASTOLIC BLOOD PRESSURE: 76 MMHG | TEMPERATURE: 98 F

## 2018-11-27 LAB
ANION GAP SERPL CALC-SCNC: 13 MMOL/L — SIGNIFICANT CHANGE UP (ref 5–17)
BUN SERPL-MCNC: 28 MG/DL — HIGH (ref 7–23)
CALCIUM SERPL-MCNC: 9.1 MG/DL — SIGNIFICANT CHANGE UP (ref 8.4–10.5)
CHLORIDE SERPL-SCNC: 99 MMOL/L — SIGNIFICANT CHANGE UP (ref 96–108)
CO2 SERPL-SCNC: 26 MMOL/L — SIGNIFICANT CHANGE UP (ref 22–31)
CREAT SERPL-MCNC: 1 MG/DL — SIGNIFICANT CHANGE UP (ref 0.5–1.3)
GLUCOSE BLDC GLUCOMTR-MCNC: 199 MG/DL — HIGH (ref 70–99)
GLUCOSE BLDC GLUCOMTR-MCNC: 294 MG/DL — HIGH (ref 70–99)
GLUCOSE BLDC GLUCOMTR-MCNC: 319 MG/DL — HIGH (ref 70–99)
GLUCOSE SERPL-MCNC: 180 MG/DL — HIGH (ref 70–99)
POTASSIUM SERPL-MCNC: 4.2 MMOL/L — SIGNIFICANT CHANGE UP (ref 3.5–5.3)
POTASSIUM SERPL-SCNC: 4.2 MMOL/L — SIGNIFICANT CHANGE UP (ref 3.5–5.3)
SODIUM SERPL-SCNC: 138 MMOL/L — SIGNIFICANT CHANGE UP (ref 135–145)

## 2018-11-27 RX ORDER — CEFUROXIME AXETIL 250 MG
1 TABLET ORAL
Qty: 2 | Refills: 0 | OUTPATIENT
Start: 2018-11-27 | End: 2018-11-27

## 2018-11-27 RX ORDER — SENNA PLUS 8.6 MG/1
2 TABLET ORAL
Qty: 0 | Refills: 0 | COMMUNITY
Start: 2018-11-27

## 2018-11-27 RX ORDER — IPRATROPIUM/ALBUTEROL SULFATE 18-103MCG
1 AEROSOL WITH ADAPTER (GRAM) INHALATION
Qty: 50 | Refills: 0 | OUTPATIENT
Start: 2018-11-27

## 2018-11-27 RX ORDER — BUDESONIDE AND FORMOTEROL FUMARATE DIHYDRATE 160; 4.5 UG/1; UG/1
2 AEROSOL RESPIRATORY (INHALATION)
Qty: 1 | Refills: 0 | OUTPATIENT
Start: 2018-11-27

## 2018-11-27 RX ORDER — OMEPRAZOLE 10 MG/1
1 CAPSULE, DELAYED RELEASE ORAL
Qty: 0 | Refills: 0 | COMMUNITY

## 2018-11-27 RX ORDER — AMLODIPINE BESYLATE 2.5 MG/1
1 TABLET ORAL
Qty: 30 | Refills: 0 | OUTPATIENT
Start: 2018-11-27 | End: 2018-12-26

## 2018-11-27 RX ORDER — IPRATROPIUM/ALBUTEROL SULFATE 18-103MCG
3 AEROSOL WITH ADAPTER (GRAM) INHALATION
Qty: 0 | Refills: 0 | COMMUNITY
Start: 2018-11-27

## 2018-11-27 RX ORDER — ATORVASTATIN CALCIUM 80 MG/1
1 TABLET, FILM COATED ORAL
Qty: 0 | Refills: 0 | COMMUNITY

## 2018-11-27 RX ORDER — ALBUTEROL 90 UG/1
2 AEROSOL, METERED ORAL
Qty: 1 | Refills: 0 | OUTPATIENT
Start: 2018-11-27 | End: 2018-12-06

## 2018-11-27 RX ADMIN — Medication 1200 MILLIGRAM(S): at 05:50

## 2018-11-27 RX ADMIN — BUDESONIDE AND FORMOTEROL FUMARATE DIHYDRATE 2 PUFF(S): 160; 4.5 AEROSOL RESPIRATORY (INHALATION) at 05:50

## 2018-11-27 RX ADMIN — Medication 100 MILLIGRAM(S): at 14:36

## 2018-11-27 RX ADMIN — Medication 3 MILLILITER(S): at 05:50

## 2018-11-27 RX ADMIN — ENOXAPARIN SODIUM 40 MILLIGRAM(S): 100 INJECTION SUBCUTANEOUS at 12:43

## 2018-11-27 RX ADMIN — Medication 6 UNIT(S): at 12:43

## 2018-11-27 RX ADMIN — AMLODIPINE BESYLATE 5 MILLIGRAM(S): 2.5 TABLET ORAL at 05:51

## 2018-11-27 RX ADMIN — Medication 3 MILLILITER(S): at 12:43

## 2018-11-27 RX ADMIN — Medication 8: at 12:42

## 2018-11-27 RX ADMIN — Medication 6 UNIT(S): at 08:38

## 2018-11-27 RX ADMIN — Medication 3 MILLILITER(S): at 00:11

## 2018-11-27 RX ADMIN — Medication 6: at 08:37

## 2018-11-27 RX ADMIN — INFLUENZA VIRUS VACCINE 0.5 MILLILITER(S): 15; 15; 15; 15 SUSPENSION INTRAMUSCULAR at 14:33

## 2018-11-27 RX ADMIN — Medication 40 MILLIGRAM(S): at 05:49

## 2018-11-27 RX ADMIN — Medication 500 MILLIGRAM(S): at 05:50

## 2018-11-27 NOTE — PROGRESS NOTE ADULT - REASON FOR ADMISSION
shortness of breath

## 2018-11-27 NOTE — DISCHARGE NOTE ADULT - PATIENT PORTAL LINK FT
You can access the [facility] Patient Portal, offered by [facility], by registering with the following website: [email or web address]

## 2018-11-27 NOTE — PROGRESS NOTE ADULT - PROBLEM SELECTOR PLAN 1
Still with significant wheezing: Cont IV steroids  11/23: cont IV steroids today too: He is still significantly wheezing: Had coughing episode in AM and can give hycodan in night prn for increased cough: But his cough is secondary to bronchospasm: Cont steroids  11/24 wheezing without acute respiratory distress. Continue Duoneb, Symbicort, Singulair as Pt feels symptoms are getting better: Cont steroids  11/25 coarse wheezing without acute distress. on room air. Continue Duoneb, Symbicort, Singulair, IV steroid. Outpatient PFT  11/26: change to oral antibiotics as well as oral tapering prednisone: Pt is still wheezing : But he feels much better:  \11/27: doing much better: still with some wheeze: but much better then before:

## 2018-11-27 NOTE — DISCHARGE NOTE ADULT - PLAN OF CARE
Resolution Likely secondary to viral infection. Now improved. Continue current medications and F/U with your PMD and pulmonary doctor. Treated with intravenous antibiotic now, switched to po. Complete the course as recommended. HgA1C this admission.  Make sure you get your HgA1c checked every three months.  If you take oral diabetes medications, check your blood glucose two times a day.  If you take insulin, check your blood glucose before meals and at bedtime.  It's important not to skip any meals.  Keep a log of your blood glucose results and always take it with you to your doctor appointments.  Keep a list of your current medications including injectables and over the counter medications and bring this medication list with you to all your doctor appointments.  If you have not seen your ophthalmologist this year call for appointment.  Check your feet daily for redness, sores, or openings. Do not self treat. If no improvement in two days call your primary care physician for an appointment.  Low blood sugar (hypoglycemia) is a blood sugar below 70mg/dl. Check your blood sugar if you feel signs/symptoms of hypoglycemia. If your blood sugar is below 70 take 15 grams of carbohydrates (ex 4 oz of apple juice, 3-4 glucose tablets, or 4-6 oz of regular soda) wait 15 minutes and repeat blood sugar to make sure it comes up above 70.  If your blood sugar is above 70 and you are due for a meal, have a meal.  If you are not due for a meal have a snack.  This snack helps keeps your blood sugar at a safe range.  Please check blood sugar closely while on steroid. Low salt diet  Activity as tolerated.  Take all medication as prescribed.  Follow up with your medical doctor for routine blood pressure monitoring at your next visit.  Notify your doctor if you have any of the following symptoms:   Dizziness, Lightheadedness, Blurry vision, Headache, Chest pain, Shortness of breath Likely secondary to viral infection(Rhino/entero virus ). Now improved. Continue current medications and F/U with your PMD and pulmonary doctor. HgA1C this admission 7.4  Make sure you get your HgA1c checked every three months.  If you take oral diabetes medications, check your blood glucose two times a day.  If you take insulin, check your blood glucose before meals and at bedtime.  It's important not to skip any meals.  Keep a log of your blood glucose results and always take it with you to your doctor appointments.  Keep a list of your current medications including injectables and over the counter medications and bring this medication list with you to all your doctor appointments.  If you have not seen your ophthalmologist this year call for appointment.  Check your feet daily for redness, sores, or openings. Do not self treat. If no improvement in two days call your primary care physician for an appointment.  Low blood sugar (hypoglycemia) is a blood sugar below 70mg/dl. Check your blood sugar if you feel signs/symptoms of hypoglycemia. If your blood sugar is below 70 take 15 grams of carbohydrates (ex 4 oz of apple juice, 3-4 glucose tablets, or 4-6 oz of regular soda) wait 15 minutes and repeat blood sugar to make sure it comes up above 70.  If your blood sugar is above 70 and you are due for a meal, have a meal.  If you are not due for a meal have a snack.  This snack helps keeps your blood sugar at a safe range.  Please check blood sugar closely while on steroid.

## 2018-11-27 NOTE — DISCHARGE NOTE ADULT - MEDICATION SUMMARY - MEDICATIONS TO CHANGE
I will SWITCH the dose or number of times a day I take the medications listed below when I get home from the hospital:    Proventil HFA 90 mcg/inh inhalation aerosol  -- 2 puff(s) inhaled 4 times a day   -- For inhalation only.  It is very important that you take or use this exactly as directed.  Do not skip doses or discontinue unless directed by your doctor.  Obtain medical advice before taking any non-prescription drugs as some may affect the action of this medication.  Shake well before use.

## 2018-11-27 NOTE — PROGRESS NOTE ADULT - PROBLEM SELECTOR PLAN 4
high blood glucose likely due to steroids: Cont to control:  11/23: his blood glucoses are running high ,secondary to steroids  11/24 not optimized. continue FS with sliding scale.Still pretty high  11/25 running high due to steroids but no anion gap. tight glycemic control recommended while Pt is on steroid  11/26: still elevated: secondary to pneumonia as well as steroids: Cont current   11/27: is on tapering steroids: cont current meds: Blood glucose is better steroids taper oral:

## 2018-11-27 NOTE — PROGRESS NOTE ADULT - PROBLEM SELECTOR PLAN 2
CT scan reviewed: Cont antibiotics  11/23: would need total of 7 days oaf antbitotics:  11/24 continue antibiotics Azithromycin and Rocephin D5/7  dc azithromycin in total of days and ceftriaxone in 7 days  11/25 Azithromycin D5/5, continue Ceftriaxone two more days. Today is 5/7 11/26: MEKA jay has received 7 days of ceftriaxone as well as 5 dyas of Azithromycin: Would change to oral antibiotics for a few days more: He would need outpt follow up within 2 weeks following dc  11/27: on oral antibtiocs for one more day

## 2018-11-27 NOTE — DISCHARGE NOTE ADULT - CARE PROVIDER_API CALL
Americo Chapman), Critical Care Medicine; Internal Medicine; Pulmonary Disease  [address] [place] [address]  Phone: [phone]  Fax: [phone]    Henrique Espinosa), Internal Medicine  [address] [address]  Phone: [phone]  Fax: [phone]

## 2018-11-27 NOTE — DISCHARGE NOTE ADULT - MEDICATION SUMMARY - MEDICATIONS TO STOP TAKING
I will STOP taking the medications listed below when I get home from the hospital:    atorvastatin 20 mg oral tablet  -- 1 tab(s) by mouth once a day No significant past surgical history

## 2018-11-27 NOTE — DISCHARGE NOTE ADULT - MEDICATION SUMMARY - MEDICATIONS TO TAKE
I will START or STAY ON the medications listed below when I get home from the hospital:    predniSONE 10 mg oral tablet  -- 3 tab(s) by mouth daily x 2 days(start 11/28)  2 tab daily x 2 days  1 tab daily x 2 days  1/2 tab daily x 2 days  -- It is very important that you take or use this exactly as directed.  Do not skip doses or discontinue unless directed by your doctor.  Obtain medical advice before taking any non-prescription drugs as some may affect the action of this medication.  Take with food or milk.    -- Indication: For Asthma exacerbation attacks    metFORMIN 1000 mg oral tablet  -- 1 tab(s) by mouth 2 times a day  -- Indication: For Diabetes 1.5, managed as type 2    benzonatate 100 mg oral capsule  -- 1 cap(s) by mouth every 8 hours, As needed, Cough  -- Indication: For COugh    budesonide-formoterol 160 mcg-4.5 mcg/inh inhalation aerosol  -- 2 puff(s) inhaled 2 times a day   -- Indication: For Asthma exacerbation attacks    ipratropium-albuterol 0.5 mg-2.5 mg/3 mLinhalation solution  -- 1 dose(s) inhaled every 6 hours   -- Indication: For Asthma exacerbation attacks    Proventil HFA 90 mcg/inh inhalation aerosol  -- 2 puff(s) inhaled 4 times a day, As Needed   -- For inhalation only.  It is very important that you take or use this exactly as directed.  Do not skip doses or discontinue unless directed by your doctor.  Obtain medical advice before taking any non-prescription drugs as some may affect the action of this medication.  Shake well before use.    -- Indication: For Asthma exacerbation attacks    amLODIPine 5 mg oral tablet  -- 1 tab(s) by mouth once a day   -- Indication: For Essential hypertension    cefuroxime 500 mg oral tablet  -- 1 tab(s) by mouth every 12 hours  -- Indication: For Pneumonia    guaiFENesin 1200 mg oral tablet, extended release  -- 1 tab(s) by mouth every 12 hours  -- Indication: For COugh    senna oral tablet  -- 2 tab(s) by mouth once a day (at bedtime), As Needed  -- Indication: For COnstipation    montelukast 10 mg oral tablet  -- 1 tab(s) by mouth once a day (at bedtime)  -- Indication: For Asthma exacerbation attacks    PriLOSEC OTC 20 mg oral delayed release tablet  -- 1 tab(s) by mouth once a day x 10 days while you on steroid  -- Indication: For to protect stomach I will START or STAY ON the medications listed below when I get home from the hospital:    predniSONE 10 mg oral tablet  -- 3 tab(s) by mouth daily x 2 days(start 11/28)  2 tab daily x 2 days  1 tab daily x 2 days  1/2 tab daily x 2 days  -- It is very important that you take or use this exactly as directed.  Do not skip doses or discontinue unless directed by your doctor.  Obtain medical advice before taking any non-prescription drugs as some may affect the action of this medication.  Take with food or milk.    -- Indication: For Asthma exacerbation attacks    metFORMIN 1000 mg oral tablet  -- 1 tab(s) by mouth 2 times a day  -- Indication: For Diabetes 1.5, managed as type 2    benzonatate 100 mg oral capsule  -- 1 cap(s) by mouth every 8 hours, As needed, Cough  -- Indication: For COugh    budesonide-formoterol 160 mcg-4.5 mcg/inh inhalation aerosol  -- 2 puff(s) inhaled 2 times a day   -- Indication: For Asthma exacerbation attacks    Proventil HFA 90 mcg/inh inhalation aerosol  -- 2 puff(s) inhaled 4 times a day, As Needed   -- For inhalation only.  It is very important that you take or use this exactly as directed.  Do not skip doses or discontinue unless directed by your doctor.  Obtain medical advice before taking any non-prescription drugs as some may affect the action of this medication.  Shake well before use.    -- Indication: For Asthma exacerbation attacks    ipratropium-albuterol 0.5 mg-2.5 mg/3 mLinhalation solution  -- 1 dose(s) inhaled every 6 hours   -- Indication: For Asthma exacerbation attacks    amLODIPine 5 mg oral tablet  -- 1 tab(s) by mouth once a day   -- Indication: For Essential hypertension    cefuroxime 500 mg oral tablet  -- 1 tab(s) by mouth every 12 hours  -- Indication: For Pneumonia    guaiFENesin 1200 mg oral tablet, extended release  -- 1 tab(s) by mouth every 12 hours  -- Indication: For COugh    senna oral tablet  -- 2 tab(s) by mouth once a day (at bedtime), As Needed  -- Indication: For COnstipation    montelukast 10 mg oral tablet  -- 1 tab(s) by mouth once a day (at bedtime)  -- Indication: For Asthma exacerbation attacks    PriLOSEC OTC 20 mg oral delayed release tablet  -- 1 tab(s) by mouth once a day x 10 days while you on steroid  -- Indication: For to protect stomach

## 2018-11-27 NOTE — DISCHARGE NOTE ADULT - HOSPITAL COURSE
Patient is a 68 year old male with HTN, DM2, HLD and asthma presents sent in from PMDs office given fevers and worsening shortness of breath x 10 days. Patient remarks that he has had shortness of breath, productive cough, wheezing, fevers (tmax at home of 102), chills and myalgias admitted for acute asthma exacerbation 2' acute viral infection with superimposed bacterial community acquired pneumonia.   Treated with IV antibiotic and IV steroid, now improved. Switched to po abx and po steroid tapering  F/U with pulmonary in 1-2 weeks, may need out patient PFT

## 2018-11-27 NOTE — PROGRESS NOTE ADULT - ATTENDING COMMENTS
DC Zithromax: Last day of ceftriaxone today: His Blood cultures have been negative: He is still wheezing: change to po steroids tomorrow morning and DC plannin/26: switch to oral antibiotics: change to tapering steroids: Outpt follow up in two weeks following dc: He would need repeat ct scan chest in 6 weeks time to ensure full resolution of infiltrates:
DC Zithromax: Last day of ceftriaxone today: His Blood cultures have been negative: He is still wheezing: change to po steroids tomorrow morning and DC plannin/26: switch to oral antibiotics: change to tapering steroids: Outpt follow up in two weeks following dc: He would need repeat ct scan chest in 6 weeks time to ensure full resolution of infiltrates:  : overall with improvement: dc planning: to follow withme in two weeks: [id number]
Slowly improving asthma exacerbation with steroids and is on pneumonia treatment
doing better, if stable in am, will plan for d/c home on tapering steroids
plan for d/c home in am on tapering steroids, d/c all abx
plan for d/c home in am on tapering steroids, d/c all abx
still wheezing, will c/w IV steroids
DC Zithromax: Last day of ceftriaxone today: His Blood cultures have been negative: He is still wheezing: change to po steroids tomorrow morning and DC planning:

## 2018-11-27 NOTE — DISCHARGE NOTE ADULT - CARE PLAN
Principal Discharge DX:	Asthma exacerbation attacks  Goal:	Resolution  Assessment and plan of treatment:	Likely secondary to viral infection. Now improved. Continue current medications and F/U with your PMD and pulmonary doctor.  Secondary Diagnosis:	Pneumonia  Assessment and plan of treatment:	Treated with intravenous antibiotic now, switched to po. Complete the course as recommended.  Secondary Diagnosis:	Diabetes 1.5, managed as type 2  Assessment and plan of treatment:	HgA1C this admission.  Make sure you get your HgA1c checked every three months.  If you take oral diabetes medications, check your blood glucose two times a day.  If you take insulin, check your blood glucose before meals and at bedtime.  It's important not to skip any meals.  Keep a log of your blood glucose results and always take it with you to your doctor appointments.  Keep a list of your current medications including injectables and over the counter medications and bring this medication list with you to all your doctor appointments.  If you have not seen your ophthalmologist this year call for appointment.  Check your feet daily for redness, sores, or openings. Do not self treat. If no improvement in two days call your primary care physician for an appointment.  Low blood sugar (hypoglycemia) is a blood sugar below 70mg/dl. Check your blood sugar if you feel signs/symptoms of hypoglycemia. If your blood sugar is below 70 take 15 grams of carbohydrates (ex 4 oz of apple juice, 3-4 glucose tablets, or 4-6 oz of regular soda) wait 15 minutes and repeat blood sugar to make sure it comes up above 70.  If your blood sugar is above 70 and you are due for a meal, have a meal.  If you are not due for a meal have a snack.  This snack helps keeps your blood sugar at a safe range.  Please check blood sugar closely while on steroid.  Secondary Diagnosis:	Essential hypertension  Assessment and plan of treatment:	Low salt diet  Activity as tolerated.  Take all medication as prescribed.  Follow up with your medical doctor for routine blood pressure monitoring at your next visit.  Notify your doctor if you have any of the following symptoms:   Dizziness, Lightheadedness, Blurry vision, Headache, Chest pain, Shortness of breath Principal Discharge DX:	Asthma exacerbation attacks  Goal:	Resolution  Assessment and plan of treatment:	Likely secondary to viral infection(Rhino/entero virus ). Now improved. Continue current medications and F/U with your PMD and pulmonary doctor.  Secondary Diagnosis:	Pneumonia  Assessment and plan of treatment:	Treated with intravenous antibiotic now, switched to po. Complete the course as recommended.  Secondary Diagnosis:	Diabetes 1.5, managed as type 2  Assessment and plan of treatment:	HgA1C this admission 7.4  Make sure you get your HgA1c checked every three months.  If you take oral diabetes medications, check your blood glucose two times a day.  If you take insulin, check your blood glucose before meals and at bedtime.  It's important not to skip any meals.  Keep a log of your blood glucose results and always take it with you to your doctor appointments.  Keep a list of your current medications including injectables and over the counter medications and bring this medication list with you to all your doctor appointments.  If you have not seen your ophthalmologist this year call for appointment.  Check your feet daily for redness, sores, or openings. Do not self treat. If no improvement in two days call your primary care physician for an appointment.  Low blood sugar (hypoglycemia) is a blood sugar below 70mg/dl. Check your blood sugar if you feel signs/symptoms of hypoglycemia. If your blood sugar is below 70 take 15 grams of carbohydrates (ex 4 oz of apple juice, 3-4 glucose tablets, or 4-6 oz of regular soda) wait 15 minutes and repeat blood sugar to make sure it comes up above 70.  If your blood sugar is above 70 and you are due for a meal, have a meal.  If you are not due for a meal have a snack.  This snack helps keeps your blood sugar at a safe range.  Please check blood sugar closely while on steroid.  Secondary Diagnosis:	Essential hypertension  Assessment and plan of treatment:	Low salt diet  Activity as tolerated.  Take all medication as prescribed.  Follow up with your medical doctor for routine blood pressure monitoring at your next visit.  Notify your doctor if you have any of the following symptoms:   Dizziness, Lightheadedness, Blurry vision, Headache, Chest pain, Shortness of breath

## 2018-11-27 NOTE — PROGRESS NOTE ADULT - SUBJECTIVE AND OBJECTIVE BOX
Patient is a 68y old  Male who presents with a chief complaint of shortness of breath (26 Nov 2018 21:39)      Any change in ROS: doing ok : his SOB is much better     MEDICATIONS  (STANDING):  ALBUTerol/ipratropium for Nebulization 3 milliLiter(s) Nebulizer every 6 hours  amLODIPine   Tablet 5 milliGRAM(s) Oral daily  buDESOnide 160 MICROgram(s)/formoterol 4.5 MICROgram(s) Inhaler 2 Puff(s) Inhalation two times a day  cefuroxime   Tablet 500 milliGRAM(s) Oral every 12 hours  dextrose 5%. 1000 milliLiter(s) (50 mL/Hr) IV Continuous <Continuous>  dextrose 50% Injectable 12.5 Gram(s) IV Push once  enoxaparin Injectable 40 milliGRAM(s) SubCutaneous every 24 hours  guaiFENesin ER 1200 milliGRAM(s) Oral every 12 hours  influenza   Vaccine 0.5 milliLiter(s) IntraMuscular once  insulin lispro (HumaLOG) corrective regimen sliding scale   SubCutaneous three times a day before meals  insulin lispro (HumaLOG) corrective regimen sliding scale   SubCutaneous at bedtime  insulin lispro Injectable (HumaLOG) 6 Unit(s) SubCutaneous before breakfast  insulin lispro Injectable (HumaLOG) 6 Unit(s) SubCutaneous before lunch  insulin lispro Injectable (HumaLOG) 6 Unit(s) SubCutaneous before dinner  montelukast 10 milliGRAM(s) Oral at bedtime  predniSONE   Tablet   Oral   predniSONE   Tablet 40 milliGRAM(s) Oral daily  senna 2 Tablet(s) Oral at bedtime    MEDICATIONS  (PRN):  benzonatate 100 milliGRAM(s) Oral every 8 hours PRN Cough  dextrose 40% Gel 15 Gram(s) Oral once PRN Blood Glucose LESS THAN 70 milliGRAM(s)/deciliter  glucagon  Injectable 1 milliGRAM(s) IntraMuscular once PRN Glucose LESS THAN 70 milligrams/deciliter  guaiFENesin   Syrup  (Sugar-Free) 100 milliGRAM(s) Oral every 6 hours PRN Cough  HYDROcodone/homatropine Syrup 5 milliLiter(s) Oral every 6 hours PRN mild pain    Vital Signs Last 24 Hrs  T(C): 36.7 (27 Nov 2018 07:22), Max: 36.8 (26 Nov 2018 16:44)  T(F): 98 (27 Nov 2018 07:22), Max: 98.3 (26 Nov 2018 16:44)  HR: 80 (27 Nov 2018 07:22) (80 - 95)  BP: 115/73 (27 Nov 2018 07:22) (115/73 - 135/78)  BP(mean): --  RR: 18 (27 Nov 2018 07:22) (18 - 20)  SpO2: 96% (27 Nov 2018 07:22) (94% - 96%)    I&O's Summary        Physical Exam:   GENERAL: NAD, well-groomed, well-developed  HEENT: SEAN/   Atraumatic, Normocephalic  ENMT: No tonsillar erythema, exudates, or enlargement; Moist mucous membranes, Good dentition, No lesions  NECK: Supple, No JVD, Normal thyroid  CHEST/LUNG: Coarse rhonchi+  CVS: Regular rate and rhythm; No murmurs, rubs, or gallops  GI: : Soft, Nontender, Nondistended; Bowel sounds present  NERVOUS SYSTEM:  Alert & Oriented X3  EXTREMITIES:  2+ Peripheral Pulses, No clubbing, cyanosis, or edema  LYMPH: No lymphadenopathy noted  SKIN: No rashes or lesions  ENDOCRINOLOGY: No Thyromegaly  PSYCH: Appropriate    Labs:                              11.4   15.57 )-----------( 363      ( 26 Nov 2018 07:28 )             34.4                         12.1   11.49 )-----------( 370      ( 25 Nov 2018 08:24 )             36.7                         10.8   12.40 )-----------( 341      ( 24 Nov 2018 10:18 )             33.2                         11.4   14.9  )-----------( 378      ( 23 Nov 2018 14:54 )             34.2     11-27    138  |  99  |  28<H>  ----------------------------<  180<H>  4.2   |  26  |  1.00  11-26    134<L>  |  97  |  25<H>  ----------------------------<  331<H>  4.6   |  24  |  0.84  11-25    133<L>  |  94<L>  |  21  ----------------------------<  333<H>  4.4   |  24  |  0.81  11-24    137  |  97  |  23  ----------------------------<  247<H>  3.4<L>   |  25  |  0.93  11-23    132<L>  |  94<L>  |  28<H>  ----------------------------<  419<H>  4.3   |  22  |  1.05    Ca    9.1      27 Nov 2018 06:46  Ca    8.9      26 Nov 2018 06:15      CAPILLARY BLOOD GLUCOSE      POCT Blood Glucose.: 294 mg/dL (27 Nov 2018 08:18)  POCT Blood Glucose.: 199 mg/dL (27 Nov 2018 00:23)  POCT Blood Glucose.: 410 mg/dL (26 Nov 2018 21:17)  POCT Blood Glucose.: 259 mg/dL (26 Nov 2018 17:01)  POCT Blood Glucose.: 281 mg/dL (26 Nov 2018 12:15)                  RECENT CULTURES:  11-20 @ 15:45 .Blood Blood     < from: CT Chest No Cont (11.19.18 @ 21:10) >  PROCEDURE:   CT of the Chest was performed without intravenous contrast.  Sagittal and coronal reformats were performed.      FINDINGS:    CHEST:     LUNGS AND LARGE AIRWAYS: Patent central airways.  There are patchy and   nodular groundglass opacities in the rightupper and lower lobes. There   are tree-in-bud opacities predominantly in the left lower lobe.  PLEURA: No pleural effusion.  VESSELS: Coronary artery calcifications.  HEART: Heart size is normal. No pericardial effusion.  MEDIASTINUM AND IBRAHIMA: No lymphadenopathy.  CHEST WALL AND LOWER NECK: Within normal limits.  VISUALIZED UPPER ABDOMEN: Within normal limits.  BONES: Within normal limits.    IMPRESSION: Groundglass and tree-in-bud opacities may be due to   infection. Recommend follow-up to resolution.                    ANTONIA DEAN M.D., RADIOLOGY RESIDENT  This document has been electronically signed.  LARRY REED M.D., ATTENDING RADIOLOGIST  This document has been electronically signed. Nov 19 2018 10:48PM        < end of copied text >             No growth at 5 days.    11-20 @ 13:51 .Blood Blood                No growth at 5 days.          RESPIRATORY CULTURES:          Studies  Chest X-RAY  CT SCAN Chest   Venous Dopplers: LE:   CT Abdomen  Others

## 2018-12-03 PROBLEM — J44.9 CHRONIC OBSTRUCTIVE PULMONARY DISEASE, UNSPECIFIED: Chronic | Status: ACTIVE | Noted: 2018-11-20

## 2018-12-05 PROBLEM — Z00.00 ENCOUNTER FOR PREVENTIVE HEALTH EXAMINATION: Status: ACTIVE | Noted: 2018-12-05

## 2018-12-06 ENCOUNTER — LABORATORY RESULT (OUTPATIENT)
Age: 68
End: 2018-12-06

## 2018-12-06 ENCOUNTER — TRANSCRIPTION ENCOUNTER (OUTPATIENT)
Age: 68
End: 2018-12-06

## 2018-12-06 ENCOUNTER — APPOINTMENT (OUTPATIENT)
Dept: PULMONOLOGY | Facility: CLINIC | Age: 68
End: 2018-12-06
Payer: COMMERCIAL

## 2018-12-06 VITALS
HEART RATE: 107 BPM | SYSTOLIC BLOOD PRESSURE: 143 MMHG | WEIGHT: 145 LBS | BODY MASS INDEX: 22.76 KG/M2 | RESPIRATION RATE: 16 BRPM | TEMPERATURE: 99.4 F | DIASTOLIC BLOOD PRESSURE: 81 MMHG | HEIGHT: 67 IN

## 2018-12-06 DIAGNOSIS — Z82.5 FAMILY HISTORY OF ASTHMA AND OTHER CHRONIC LOWER RESPIRATORY DISEASES: ICD-10-CM

## 2018-12-06 PROCEDURE — 36415 COLL VENOUS BLD VENIPUNCTURE: CPT

## 2018-12-06 PROCEDURE — 99215 OFFICE O/P EST HI 40 MIN: CPT | Mod: 25

## 2018-12-06 RX ORDER — AMLODIPINE BESYLATE 5 MG/1
5 TABLET ORAL
Refills: 0 | Status: ACTIVE | COMMUNITY
Start: 2018-12-06

## 2018-12-06 RX ORDER — ATORVASTATIN CALCIUM 10 MG/1
10 TABLET, FILM COATED ORAL
Refills: 0 | Status: ACTIVE | COMMUNITY
Start: 2018-12-06

## 2018-12-06 RX ORDER — MONTELUKAST 10 MG/1
10 TABLET, FILM COATED ORAL DAILY
Refills: 0 | Status: ACTIVE | COMMUNITY
Start: 2018-12-06

## 2018-12-07 LAB
ALBUMIN SERPL ELPH-MCNC: 4.1 G/DL
ALP BLD-CCNC: 57 U/L
ALT SERPL-CCNC: 15 U/L
ANION GAP SERPL CALC-SCNC: 14 MMOL/L
AST SERPL-CCNC: 13 U/L
BASOPHILS # BLD AUTO: 0.01 K/UL
BASOPHILS NFR BLD AUTO: 0.1 %
BILIRUB SERPL-MCNC: 0.4 MG/DL
BUN SERPL-MCNC: 13 MG/DL
CALCIUM SERPL-MCNC: 9.6 MG/DL
CHLORIDE SERPL-SCNC: 97 MMOL/L
CO2 SERPL-SCNC: 26 MMOL/L
CREAT SERPL-MCNC: 1.09 MG/DL
EOSINOPHIL # BLD AUTO: 0.09 K/UL
EOSINOPHIL # BLD MANUAL: 100 /UL
EOSINOPHIL NFR BLD AUTO: 0.6 %
GLUCOSE SERPL-MCNC: 243 MG/DL
HCT VFR BLD CALC: 37.3 %
HGB BLD-MCNC: 12.1 G/DL
IMM GRANULOCYTES NFR BLD AUTO: 0.2 %
LYMPHOCYTES # BLD AUTO: 1.94 K/UL
LYMPHOCYTES NFR BLD AUTO: 13.9 %
MAN DIFF?: NORMAL
MCHC RBC-ENTMCNC: 25.7 PG
MCHC RBC-ENTMCNC: 32.4 GM/DL
MCV RBC AUTO: 79.4 FL
MONOCYTES # BLD AUTO: 0.83 K/UL
MONOCYTES NFR BLD AUTO: 5.9 %
NEUTROPHILS # BLD AUTO: 11.08 K/UL
NEUTROPHILS NFR BLD AUTO: 79.3 %
PLATELET # BLD AUTO: 212 K/UL
POTASSIUM SERPL-SCNC: 4.4 MMOL/L
PROT SERPL-MCNC: 7.1 G/DL
RBC # BLD: 4.7 M/UL
RBC # FLD: 14.6 %
SODIUM SERPL-SCNC: 137 MMOL/L
WBC # FLD AUTO: 13.98 K/UL

## 2018-12-07 RX ORDER — BUDESONIDE AND FORMOTEROL FUMARATE DIHYDRATE 160; 4.5 UG/1; UG/1
160-4.5 AEROSOL RESPIRATORY (INHALATION) TWICE DAILY
Qty: 1 | Refills: 2 | Status: DISCONTINUED | COMMUNITY
Start: 2018-12-06 | End: 2018-12-07

## 2018-12-11 LAB — HP PNL SER: NORMAL

## 2018-12-17 LAB
A ALTERNATA IGE QN: <0.1 KUA/L
A FUMIGATUS IGE QN: <0.1 KUA/L
BERMUDA GRASS IGE QN: <0.1 KUA/L
BOXELDER IGE QN: <0.1 KUA/L
C HERBARUM IGE QN: <0.1 KUA/L
CALIF WALNUT IGE QN: <0.1 KUA/L
CAT DANDER IGE QN: <0.1 KUA/L
CMN PIGWEED IGE QN: <0.1 KUA/L
COMMON RAGWEED IGE QN: <0.1 KUA/L
COTTONWOOD IGE QN: <0.1 KUA/L
D FARINAE IGE QN: <0.1 KUA/L
D PTERONYSS IGE QN: <0.1 KUA/L
DEPRECATED A ALTERNATA IGE RAST QL: 0
DEPRECATED A FUMIGATUS IGE RAST QL: 0
DEPRECATED BERMUDA GRASS IGE RAST QL: 0
DEPRECATED BOXELDER IGE RAST QL: 0
DEPRECATED C HERBARUM IGE RAST QL: 0
DEPRECATED CAT DANDER IGE RAST QL: 0
DEPRECATED COMMON PIGWEED IGE RAST QL: 0
DEPRECATED COMMON RAGWEED IGE RAST QL: 0
DEPRECATED COTTONWOOD IGE RAST QL: 0
DEPRECATED D FARINAE IGE RAST QL: 0
DEPRECATED D PTERONYSS IGE RAST QL: 0
DEPRECATED DOG DANDER IGE RAST QL: 0
DEPRECATED GOOSEFOOT IGE RAST QL: 0
DEPRECATED LONDON PLANE IGE RAST QL: 0
DEPRECATED MUGWORT IGE RAST QL: 0
DEPRECATED P NOTATUM IGE RAST QL: 0
DEPRECATED RED CEDAR IGE RAST QL: 0
DEPRECATED ROACH IGE RAST QL: 0
DEPRECATED SHEEP SORREL IGE RAST QL: 0
DEPRECATED SILVER BIRCH IGE RAST QL: 0
DEPRECATED TIMOTHY IGE RAST QL: 0
DEPRECATED WHITE ASH IGE RAST QL: 0
DEPRECATED WHITE OAK IGE RAST QL: 0
DOG DANDER IGE QN: <0.1 KUA/L
GOOSEFOOT IGE QN: <0.1 KUA/L
LONDON PLANE IGE QN: <0.1 KUA/L
MUGWORT IGE QN: <0.1 KUA/L
MULBERRY (T70) CLASS: 0
MULBERRY (T70) CONC: <0.1 KUA/L
P NOTATUM IGE QN: <0.1 KUA/L
RED CEDAR IGE QN: <0.1 KUA/L
ROACH IGE QN: <0.1 KUA/L
SHEEP SORREL IGE QN: <0.1 KUA/L
SILVER BIRCH IGE QN: <0.1 KUA/L
TIMOTHY IGE QN: <0.1 KUA/L
TOTAL IGE SMQN RAST: 62 KU/L
TREE ALLERG MIX1 IGE QL: 0
WHITE ASH IGE QN: <0.1 KUA/L
WHITE ELM IGE QN: 0
WHITE ELM IGE QN: <0.1 KUA/L
WHITE OAK IGE QN: <0.1 KUA/L

## 2018-12-21 ENCOUNTER — OTHER (OUTPATIENT)
Age: 68
End: 2018-12-21

## 2019-01-09 PROCEDURE — 84295 ASSAY OF SERUM SODIUM: CPT

## 2019-01-09 PROCEDURE — 71045 X-RAY EXAM CHEST 1 VIEW: CPT

## 2019-01-09 PROCEDURE — 83605 ASSAY OF LACTIC ACID: CPT

## 2019-01-09 PROCEDURE — 87798 DETECT AGENT NOS DNA AMP: CPT

## 2019-01-09 PROCEDURE — 87581 M.PNEUMON DNA AMP PROBE: CPT

## 2019-01-09 PROCEDURE — 83036 HEMOGLOBIN GLYCOSYLATED A1C: CPT

## 2019-01-09 PROCEDURE — 80048 BASIC METABOLIC PNL TOTAL CA: CPT

## 2019-01-09 PROCEDURE — 84132 ASSAY OF SERUM POTASSIUM: CPT

## 2019-01-09 PROCEDURE — 87633 RESP VIRUS 12-25 TARGETS: CPT

## 2019-01-09 PROCEDURE — 82330 ASSAY OF CALCIUM: CPT

## 2019-01-09 PROCEDURE — 85014 HEMATOCRIT: CPT

## 2019-01-09 PROCEDURE — 99285 EMERGENCY DEPT VISIT HI MDM: CPT | Mod: 25

## 2019-01-09 PROCEDURE — 82962 GLUCOSE BLOOD TEST: CPT

## 2019-01-09 PROCEDURE — 82565 ASSAY OF CREATININE: CPT

## 2019-01-09 PROCEDURE — 87040 BLOOD CULTURE FOR BACTERIA: CPT

## 2019-01-09 PROCEDURE — 84484 ASSAY OF TROPONIN QUANT: CPT

## 2019-01-09 PROCEDURE — 96365 THER/PROPH/DIAG IV INF INIT: CPT

## 2019-01-09 PROCEDURE — 93005 ELECTROCARDIOGRAM TRACING: CPT

## 2019-01-09 PROCEDURE — 94640 AIRWAY INHALATION TREATMENT: CPT

## 2019-01-09 PROCEDURE — 80053 COMPREHEN METABOLIC PANEL: CPT

## 2019-01-09 PROCEDURE — 90686 IIV4 VACC NO PRSV 0.5 ML IM: CPT

## 2019-01-09 PROCEDURE — 71250 CT THORAX DX C-: CPT

## 2019-01-09 PROCEDURE — 82803 BLOOD GASES ANY COMBINATION: CPT

## 2019-01-09 PROCEDURE — 87486 CHLMYD PNEUM DNA AMP PROBE: CPT

## 2019-01-09 PROCEDURE — 82947 ASSAY GLUCOSE BLOOD QUANT: CPT

## 2019-01-09 PROCEDURE — 85027 COMPLETE CBC AUTOMATED: CPT

## 2019-01-09 PROCEDURE — 82435 ASSAY OF BLOOD CHLORIDE: CPT

## 2019-01-17 ENCOUNTER — MED ADMIN CHARGE (OUTPATIENT)
Age: 69
End: 2019-01-17

## 2019-01-17 ENCOUNTER — APPOINTMENT (OUTPATIENT)
Dept: PULMONOLOGY | Facility: CLINIC | Age: 69
End: 2019-01-17
Payer: COMMERCIAL

## 2019-01-17 VITALS
TEMPERATURE: 98 F | SYSTOLIC BLOOD PRESSURE: 150 MMHG | WEIGHT: 145 LBS | HEIGHT: 67 IN | HEART RATE: 100 BPM | RESPIRATION RATE: 16 BRPM | OXYGEN SATURATION: 96 % | BODY MASS INDEX: 22.76 KG/M2 | DIASTOLIC BLOOD PRESSURE: 76 MMHG

## 2019-01-17 PROCEDURE — 99215 OFFICE O/P EST HI 40 MIN: CPT | Mod: 25

## 2019-01-17 PROCEDURE — 94640 AIRWAY INHALATION TREATMENT: CPT

## 2019-01-17 PROCEDURE — 96372 THER/PROPH/DIAG INJ SC/IM: CPT

## 2019-01-17 PROCEDURE — 36415 COLL VENOUS BLD VENIPUNCTURE: CPT

## 2019-01-17 RX ORDER — GUAIFENESIN 600 MG/1
600 TABLET, EXTENDED RELEASE ORAL
Qty: 60 | Refills: 0 | Status: ACTIVE | COMMUNITY
Start: 2019-01-17 | End: 1900-01-01

## 2019-01-17 RX ORDER — METHYLPREDNISOLONE 40 MG/ML
40 INJECTION, POWDER, LYOPHILIZED, FOR SOLUTION INTRAMUSCULAR; INTRAVENOUS
Qty: 1 | Refills: 0 | Status: COMPLETED | OUTPATIENT
Start: 2019-01-17

## 2019-01-17 RX ORDER — ALBUTEROL SULFATE 2.5 MG/3ML
(2.5 MG/3ML) SOLUTION RESPIRATORY (INHALATION) 4 TIMES DAILY
Qty: 0 | Refills: 0 | Status: COMPLETED | OUTPATIENT
Start: 2019-01-17

## 2019-01-17 RX ADMIN — METHYLPREDNISOLONE SODIUM SUCCINATE 40 MG: 40 INJECTION, POWDER, FOR SOLUTION INTRAMUSCULAR; INTRAVENOUS at 00:00

## 2019-01-17 RX ADMIN — ALBUTEROL SULFATE 0 0.083%: 2.5 SOLUTION RESPIRATORY (INHALATION) at 00:00

## 2019-01-17 NOTE — CONSULT LETTER
[Dear  ___] : Dear  [unfilled], [Courtesy Letter:] : I had the pleasure of seeing your patient, [unfilled], in my office today. [Sincerely,] : Sincerely, [DrNathalia  ___] : Dr. MINER

## 2019-01-17 NOTE — HISTORY OF PRESENT ILLNESS
[FreeTextEntry1] : This letter  is regarding your patient  who  attended pulmonary out patient office today.  I have reviewed  patient's  past history, social history, family history and medication list. I also  reviewed nurse practitioners/ and fellows  notes and assessment and agree with it.  \par The patient was referred after hospital discharge for asthma exacerbation 2/2 rhinovirus and superimposed bacterial pneumonia. Completed 5 days of azithromycin, 7 days of ceftriaxone/cefuroxime, and prednisone taper. Takes symbicort twice a day, using the ventolin still almost daily 2-3x/day for coughing fits, worse at night, ventolin helps. From [place], moved here 1 year ago. Had history of asthma many years ago, was well controlled, worsened during the summer here in [place]. 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 Snores at night, frequent night time awakenings, cannot sleep laying flat 2/2 cough\par \par ------No history of , fever, chills , rigors, chest pain, or hemoptysis. Nohistory of Raynaud's phenomenon. No h/o significant weight loss in last few months. No history of liver dysfunction , collagen vascular disorder or chronic thromboembolic disease. I would classify the patient's dyspnea as WHO  FUNCTIONAL CLASS II------SPUTUM---DEC 21 -GRAM POSITIVE RODS ---final identification pending\par ----Echo  date------N/A\par ----Pft date---------N/A\par ----Ct scan 11/2018------- diffuse tree in bud opacities with ground glass opacities in RLL and LLL, bronchiectasis worse in LLL\par ----Cath date------------N/A\par \par \par jan 2019 here for URI- cough/congestion- afebrile- up to date on influenza vac

## 2019-01-17 NOTE — DISCUSSION/SUMMARY
[FreeTextEntry1] : ---Assessment plan----------The patient has been referred here for further opinion regarding pulmonary problem, moderate persistent asthma 2/2 viral and bacterial pneumonia with mild bronchiectasis; high suspicion for MORIAH and THAI\par \par 1- uri- s/p albuterol in office and medrol 40mg IM- continue prednisone 20mg w/5mg taper schedule given q 5 days, start zpack\par \par 2- Abnormal CT - tree in bud opacities and GGO, may be 2/2 recent viral and bacterial pneumonia, but given chronicity of symptoms and persistent cough/wheezing high suspicion for THAI. awaiting final identification of the gram-positive rods in the sputum-------- is inconclusivewill will perform bronchoscopy with BAL\par \par 3- GERD - dilated esophagus and reflux seen on CT associated with worsened cough while laying flat and frequent throat clearing. Continue with omeprazole 40mg PO daily\par \par 4- MORIAH - high suspicion for MORIAH given Mallampati score and frequent nighttime awakenings. Referral for PSG given. \par 5- dm -needs  endo endocrinologycrinology\par \par 5) labs drawn today\par f/u in 6-8 weeks\par \par Thanks for allowing  me to participate  in the care of this patient.  Patient at this time  will follow  the above mentioned recommendations and return back for follow up visit. If you have any questions  I can be reached  at #[phone] (beeper)  or  [phone] (office).\par \par Marlys Simms MD, [facility] \par Director, Pulmonary Hypertension Program \par [facility] \par Division of Pulmonary, Critical Care and Sleep Medicine \par  Professor of Medicine \par [employer]\par

## 2019-01-17 NOTE — PHYSICAL EXAM
[General Appearance - Well Developed] : well developed [General Appearance - Well Nourished] : well nourished [General Appearance - In No Acute Distress] : no acute distress [Normal Conjunctiva] : the conjunctiva exhibited no abnormalities [Normal Oropharynx] : normal oropharynx [IV] : IV [Neck Appearance] : the appearance of the neck was normal [Neck Cervical Mass (___cm)] : no neck mass was observed [Jugular Venous Distention Increased] : there was no jugular-venous distention [Heart Rate And Rhythm] : heart rate and rhythm were normal [Heart Sounds] : normal S1 and S2 [Murmurs] : no murmurs present [Scattered Wheezes] : scattered wheezing [Bowel Sounds] : normal bowel sounds [Abdomen Soft] : soft [Abdomen Tenderness] : non-tender [Abnormal Walk] : normal gait [Nail Clubbing] : no clubbing of the fingernails [Cyanosis, Localized] : no localized cyanosis [Skin Color & Pigmentation] : normal skin color and pigmentation [Skin Turgor] : normal skin turgor [] : no rash [No Focal Deficits] : no focal deficits [Affect] : the affect was normal [Mood] : the mood was normal [FreeTextEntry1] : No kyphosis

## 2019-01-17 NOTE — REVIEW OF SYSTEMS
[Nasal Congestion] : nasal congestion [Postnasal Drip] : postnasal drip [Sore Throat] : sore throat [Cough] : cough [Chest Tightness] : chest tightness [Wheezing] : wheezing [Hypertension] : ~T hypertension [Nasal Discharge] : nasal discharge [Heartburn] : heartburn [Reflux] : reflux [Anxiety] : anxiety [Diabetes] : diabetes mellitus [Difficulty Maintaining Sleep] : difficulty maintaining sleep [Snoring] : snoring [Nonrestorative Sleep] : nonrestorative sleep [Fever] : no fever [Chills] : no chills [Fatigue] : no fatigue [Recent Wt Loss (___ Lbs)] : no recent weight loss [Sinus Problems] : no sinus problems [Sputum] : not coughing up ~M sputum [Dyspnea] : no dyspnea [Edema] : ~T edema was not present [Watery Eyes] : no discharge from the eyes [Nausea] : no nausea [Vomiting] : no vomiting [Nocturia] : no nocturia [Frequency] : no change in urinary frequency [Back Pain] : ~T no back pain [Myalgias] : no myalgias [Scleroderma] : no scleroderma [Rash] : no [unfilled] rash [Anemia] : no anemia [Blood Transfusion] : no blood transfusion [Headache] : no headache [Dizziness] : no dizziness [Depression] : no depression [Thyroid Problem] : no thyroid problem [DVT] : no DVT [Pulmonary Embolism] : no pulmonary embolism

## 2019-01-18 LAB
25(OH)D3 SERPL-MCNC: 32.3 NG/ML
ALBUMIN SERPL ELPH-MCNC: 4.5 G/DL
ALP BLD-CCNC: 64 U/L
ALT SERPL-CCNC: 15 U/L
ANION GAP SERPL CALC-SCNC: 13 MMOL/L
AST SERPL-CCNC: 15 U/L
BASOPHILS # BLD AUTO: 0.04 K/UL
BASOPHILS NFR BLD AUTO: 0.5 %
BILIRUB SERPL-MCNC: 0.4 MG/DL
BUN SERPL-MCNC: 10 MG/DL
CALCIUM SERPL-MCNC: 10.3 MG/DL
CHLORIDE SERPL-SCNC: 99 MMOL/L
CO2 SERPL-SCNC: 27 MMOL/L
CREAT SERPL-MCNC: 0.88 MG/DL
EOSINOPHIL # BLD AUTO: 0.15 K/UL
EOSINOPHIL NFR BLD AUTO: 1.8 %
GLUCOSE SERPL-MCNC: 251 MG/DL
HBA1C MFR BLD HPLC: 9.6 %
HCT VFR BLD CALC: 36.7 %
HGB BLD-MCNC: 11.8 G/DL
IMM GRANULOCYTES NFR BLD AUTO: 0.9 %
LYMPHOCYTES # BLD AUTO: 1.64 K/UL
LYMPHOCYTES NFR BLD AUTO: 19.2 %
MAN DIFF?: NORMAL
MCHC RBC-ENTMCNC: 26.2 PG
MCHC RBC-ENTMCNC: 32.2 GM/DL
MCV RBC AUTO: 81.6 FL
MONOCYTES # BLD AUTO: 0.53 K/UL
MONOCYTES NFR BLD AUTO: 6.2 %
NEUTROPHILS # BLD AUTO: 6.11 K/UL
NEUTROPHILS NFR BLD AUTO: 71.4 %
PLATELET # BLD AUTO: 274 K/UL
POTASSIUM SERPL-SCNC: 4.4 MMOL/L
PROT SERPL-MCNC: 7.3 G/DL
RBC # BLD: 4.5 M/UL
RBC # FLD: 14.8 %
SODIUM SERPL-SCNC: 139 MMOL/L
TSH SERPL-ACNC: 1.69 UIU/ML
VIT B12 SERPL-MCNC: 671 PG/ML
WBC # FLD AUTO: 8.55 K/UL

## 2019-01-24 ENCOUNTER — MOBILE ON CALL (OUTPATIENT)
Age: 69
End: 2019-01-24

## 2019-01-24 ENCOUNTER — TRANSCRIPTION ENCOUNTER (OUTPATIENT)
Age: 69
End: 2019-01-24

## 2019-01-29 ENCOUNTER — APPOINTMENT (OUTPATIENT)
Dept: ENDOCRINOLOGY | Facility: CLINIC | Age: 69
End: 2019-01-29
Payer: COMMERCIAL

## 2019-01-29 ENCOUNTER — RESULT CHARGE (OUTPATIENT)
Age: 69
End: 2019-01-29

## 2019-01-29 LAB — GLUCOSE BLDC GLUCOMTR-MCNC: 351

## 2019-01-29 PROCEDURE — 82962 GLUCOSE BLOOD TEST: CPT

## 2019-01-29 PROCEDURE — G0108 DIAB MANAGE TRN  PER INDIV: CPT

## 2019-02-12 LAB — ACID FAST STN SPT: NORMAL

## 2019-02-14 ENCOUNTER — APPOINTMENT (OUTPATIENT)
Dept: ENDOCRINOLOGY | Facility: CLINIC | Age: 69
End: 2019-02-14
Payer: COMMERCIAL

## 2019-02-14 VITALS
WEIGHT: 148 LBS | HEART RATE: 107 BPM | OXYGEN SATURATION: 98 % | SYSTOLIC BLOOD PRESSURE: 120 MMHG | BODY MASS INDEX: 23.23 KG/M2 | HEIGHT: 67 IN | DIASTOLIC BLOOD PRESSURE: 80 MMHG

## 2019-02-14 PROCEDURE — 99205 OFFICE O/P NEW HI 60 MIN: CPT

## 2019-02-14 PROCEDURE — 99214 OFFICE O/P EST MOD 30 MIN: CPT

## 2019-02-16 NOTE — PHYSICAL EXAM
[Alert] : alert [Well Nourished] : well nourished [No Proptosis] : no proptosis [Normal Hearing] : hearing was normal [Normal Lips/Gums] : the lips and gums were normal [Supple] : the neck was supple [No LAD] : no lymphadenopathy [No Respiratory Distress] : no respiratory distress [Normal Rate and Effort] : normal respiratory rhythm and effort [No Accessory Muscle Use] : no accessory muscle use [Clear to Auscultation] : lungs were clear to auscultation bilaterally [Normal Rate] : heart rate was normal  [Normal S1, S2] : normal S1 and S2 [Regular Rhythm] : with a regular rhythm [No Edema] : there was no peripheral edema [Normal Bowel Sounds] : normal bowel sounds [Not Tender] : non-tender [Soft] : abdomen soft [Not Distended] : not distended [No CVA Tenderness] : no ~M costovertebral angle tenderness [Normal Gait] : normal gait [No Involuntary Movements] : no involuntary movements were seen [Right Foot Was Examined] : right foot ~C was examined [Left Foot Was Examined] : left foot ~C was examined [Normal] : normal [Full ROM] : with full range of motion [No Tremors] : no tremors [Oriented x3] : oriented to person, place, and time [Normal Affect] : the affect was normal [Diminished Throughout Both Feet] : normal tactile sensation with monofilament testing throughout both feet

## 2019-02-16 NOTE — REVIEW OF SYSTEMS
[Pain/Numbness of Digits] : pain/numbness of digits [All other systems negative] : All other systems negative

## 2019-02-16 NOTE — ASSESSMENT
[FreeTextEntry1] : This is a 68 y.o. man w/ DM2, HTN, asthma here for new pt. visit for DM2.\par \par DM2: Hba1c 9.6 1/2019. He is currently on Metformin 2500mg. \par He was on prednisone for 3 mth, stopped about 1.5 week ago. FS's have been trending down but they're still above goal. Will decrease metformin to 1g PO BID. Will start Januvia 100mg PO daily.\par Instructed to check FS's at least BID- qam and 1-2h after dinner. He was counseled on lifestyle modification through diet and exercise.\par Has upcoming optho appt. Sunday x of neuropathy. Instructed to f/u w/ podiatry.\par Will check urine microalb today.\par \par HTN: Controlled on current regimen.\par \par HLD: Check lipids. Cont. statin.\par \par \par

## 2019-02-16 NOTE — HISTORY OF PRESENT ILLNESS
[FreeTextEntry1] : This is a 68 y.o. man w/ DM2, HTN, asthma here for new pt. visit for DM2.\par \par He was hospitalized 11/2018 w/ CAP.\par \par He was on prednisone for 3 mth, stopped about 1.5 week ago.\par \par He has DM2 > 15 years.\par \par He is currently on Metformin 2500mg. Hba1c 9.6 1/2019.\par \par He check FS's :\par AM:  112-148\par 2H after bkfst: 168-321\par B4 lunch: 150-231\par 2h after lunch: 169-316\par B4 dinner: 132-290\par 2h after dinner: 175-261\par Numbers have been trending down since he stopped the steroids.\par \par He eats 3 meals a day: He has been watching his diet\par Bkfst: Chipote w/ black tea\par Lunch: Chipote w/ rice, vegetables\par Dinner: similar to lunch\par Lunch and dinner are his biggest meals. He snacks on a couple of biscuits a day. He'll eat a few fruit a day.\par \par He exercises for 30 min a day on the treadmill. Weight has been stable.\par \par He has been feeling overall ok. Reports good energy level. No c/o CP or SOB.\par \par He last saw optho last year. He has an upcoming appt. Has occ. sx of neuropathy at night.\par \par \par \par \par

## 2019-02-19 ENCOUNTER — TRANSCRIPTION ENCOUNTER (OUTPATIENT)
Age: 69
End: 2019-02-19

## 2019-02-20 LAB
CHOLEST SERPL-MCNC: 192 MG/DL
CHOLEST/HDLC SERPL: 3.3 RATIO
CREAT SPEC-SCNC: 45 MG/DL
HDLC SERPL-MCNC: 59 MG/DL
LDLC SERPL CALC-MCNC: 100 MG/DL
MICROALBUMIN 24H UR DL<=1MG/L-MCNC: <1.2 MG/DL
MICROALBUMIN/CREAT 24H UR-RTO: NORMAL
TRIGL SERPL-MCNC: 165 MG/DL

## 2019-02-20 RX ORDER — SITAGLIPTIN 100 MG/1
100 TABLET, FILM COATED ORAL DAILY
Qty: 3 | Refills: 0 | Status: DISCONTINUED | COMMUNITY
Start: 2019-02-14 | End: 2019-02-20

## 2019-02-22 ENCOUNTER — APPOINTMENT (OUTPATIENT)
Dept: OPHTHALMOLOGY | Facility: CLINIC | Age: 69
End: 2019-02-22

## 2019-03-04 ENCOUNTER — TRANSCRIPTION ENCOUNTER (OUTPATIENT)
Age: 69
End: 2019-03-04

## 2019-03-08 ENCOUNTER — TRANSCRIPTION ENCOUNTER (OUTPATIENT)
Age: 69
End: 2019-03-08

## 2019-03-13 LAB — ACID FAST STN SPT: ABNORMAL

## 2019-03-21 LAB — ACID FAST STN SPT: ABNORMAL

## 2019-04-01 NOTE — ED PROVIDER NOTE - NSTIMEPROVIDERCAREINITIATE_GEN_ER
[facility] Medicine History & Physical      PCP: GERARD Carlos CNP    Date of Service: Pt seen/examined on 4/1/19 and admitted on 4/1/19 to Inpatient    Chief Complaint   Patient presents with    Flank Pain     pt states that she has had R flank pain x 2 days. pt states hx of kidney stones. pt reports n/v x 1 day. History Of Present Illness: The patient is a 45 y.o. female with PMH below, presents with R flank and RLQ abd pain, N/V. She reports that this pain started 2 days ago, waxing and waning, severe, constant tonight so summoned EMS. She appeared to be in extreme pain at arrival in the ED. This has improved somewhat with pain Rx at this time. She reports associated N/V. She is a little drowsy after getting IV dilaudid. She reports that she has a hx of kidney stones but this was different from previous experiences with kidney stones. When asked how it was different she says it was much more painful. She has a hx of back surgery in the past.  She appears more comfortable at this time. Past Medical History:        Diagnosis Date    Hypothyroid     Kidney stone     1-2 years ago    Thyroid disease        Past Surgical History:        Procedure Laterality Date    BACK SURGERY  2014    HYSTERECTOMY  2007       Medications Prior to Admission:    Prior to Admission medications    Medication Sig Start Date End Date Taking?  Authorizing Provider   QUEtiapine (SEROQUEL) 50 MG tablet Take 200 mg by mouth nightly Takes 1/2 tab twice daily and then takes 300 mg at night    Yes Historical Provider, MD   levothyroxine (SYNTHROID) 25 MCG tablet Take 1 tablet by mouth daily 3/20/19  Yes GERARD Galindo CNP   mirtazapine (REMERON) 7.5 MG tablet TAKE 1 TAB BY MOUTH NIGHTLY FOR 30 DAYS. 3/1/19  Yes Historical Provider, MD   lisinopril (PRINIVIL;ZESTRIL) 10 MG tablet TAKE 1 TABLET BY MOUTH EVERY DAY 12/17/18  Yes GERARD Galindo CNP   venlafaxine (EFFEXOR) 75 MG tablet Take 75 mg by mouth 2 times daily   Yes Historical Provider, MD   QUEtiapine (SEROQUEL) 300 MG tablet Take 100 mg by mouth daily    Yes Historical Provider, MD       Allergies:  Depakote [valproic acid]    Social History:    TOBACCO:   reports that she has been smoking cigarettes. She has a 2.00 pack-year smoking history. She has never used smokeless tobacco.  ETOH:   reports that she drinks alcohol. Family History:  Reviewed in detail and negative for DM, Early CAD, Cancer (except as below). Positive as follows:        Problem Relation Age of Onset    Diabetes Mother     High Blood Pressure Mother     Arthritis Mother     Diabetes Father     High Blood Pressure Father     Cancer Father     Arthritis Father     Depression Father     Mental Illness Father     Hearing Loss Father     Depression Sister     Mental Illness Sister     Hearing Loss Sister     Asthma Brother     Depression Brother     Mental Illness Brother        REVIEW OF SYSTEMS:   Pertinent positives/negatives as follows: R flank and RLQ abd pain, and as discussed in HPI, otherwise a complete ROS performed and all other systems are negative  PHYSICAL EXAM PERFORMED:    /72   Pulse 107   Temp 99.8 °F (37.7 °C) (Oral)   Resp 16   Ht 5' 2\" (1.575 m)   Wt 150 lb (68 kg)   LMP 01/01/2007 (Approximate)   SpO2 97%   BMI 27.44 kg/m²     GEN:  A&Ox3, appears uncomfortable. HEENT:  NC/AT,EOMI, MMM, no erythema/exudates or visible masses. CVS:  Normal S1,S2. RRR. Without M/G/R.   LUNG:   CTA-B. no wheezes, rales or rhonchi  ABD:  Soft, ND, RLQ sided abd and R flank, BS+ x4. Without G/R.  EXT: 2+ pulses, no c/c/e. Brisk cap refill. PSY:  Thought process intact, affect appropriate. YING:  CN III-XII intact, moves all 4 spontaneously, sensory grossly intact. SKIN: No rash or lesions on visible skin. Repeat exam (sepsis) @ 0235 is essentially unchanged except she is more drowsy and appears more comfortable at this time.       Chart review shows recent radiographs:  Ct Abdomen Pelvis Wo Contrast    Result Date: 3/31/2019  EXAMINATION: CT OF THE ABDOMEN AND PELVIS WITHOUT CONTRAST 3/31/2019 9:57 pm TECHNIQUE: CT of the abdomen and pelvis was performed without the administration of intravenous contrast. Multiplanar reformatted images are provided for review. Dose modulation, iterative reconstruction, and/or weight based adjustment of the mA/kV was utilized to reduce the radiation dose to as low as reasonably achievable. COMPARISON: None HISTORY: ORDERING SYSTEM PROVIDED HISTORY: 2 days of right sided flank pain TECHNOLOGIST PROVIDED HISTORY: If patient is on cardiac monitor and/or pulse ox, they may be taken off cardiac monitor and pulse ox, left on O2 if currently on. All monitors reattached when patient returns to room. Ordering Physician Provided Reason for Exam: rt side abdo pain Acuity: Acute Type of Exam: Initial Additional signs and symptoms: rt side abdo pain, hx of renal stones FINDINGS: Lower Chest: There is a calcified granuloma within the right lower lobe. Lung bases are otherwise clear. Organs: Noncontrast images of the kidneys and ureters demonstrate a 5 mm calculus within the distal right ureter, at the level of the right hip joint, with associated mild right hydronephrosis and right perinephric stranding. No additional renal or ureteral calculus is identified. There is asymmetric right nephromegaly. The bilateral kidneys are otherwise unremarkable in noncontrast appearance. There is diffuse hepatic steatosis. The liver is otherwise unremarkable in noncontrast appearance. The spleen, pancreas, gallbladder, and adrenal glands are unremarkable in noncontrast appearance. GI/Bowel: The hollow GI tract is unremarkable, without evidence of wall thickening, dilatation, or obstruction. There is no evidence of appendicitis. Pelvis: There is the aforementioned distal right ureteral calculus.   The urinary bladder is normal.  The patient 19-Nov-2018 18:36

## 2019-04-02 ENCOUNTER — APPOINTMENT (OUTPATIENT)
Dept: PULMONOLOGY | Facility: CLINIC | Age: 69
End: 2019-04-02
Payer: MEDICAID

## 2019-04-02 VITALS
RESPIRATION RATE: 16 BRPM | BODY MASS INDEX: 23.53 KG/M2 | HEIGHT: 67 IN | OXYGEN SATURATION: 98 % | DIASTOLIC BLOOD PRESSURE: 82 MMHG | HEART RATE: 102 BPM | SYSTOLIC BLOOD PRESSURE: 155 MMHG | WEIGHT: 149.91 LBS | TEMPERATURE: 98 F

## 2019-04-02 DIAGNOSIS — R06.09 OTHER FORMS OF DYSPNEA: ICD-10-CM

## 2019-04-02 PROCEDURE — 99215 OFFICE O/P EST HI 40 MIN: CPT

## 2019-04-03 NOTE — PHYSICAL EXAM
[General Appearance - Well Developed] : well developed [General Appearance - Well Nourished] : well nourished [General Appearance - In No Acute Distress] : no acute distress [Normal Conjunctiva] : the conjunctiva exhibited no abnormalities [Normal Oropharynx] : normal oropharynx [IV] : IV [Neck Appearance] : the appearance of the neck was normal [Neck Cervical Mass (___cm)] : no neck mass was observed [Jugular Venous Distention Increased] : there was no jugular-venous distention [Heart Rate And Rhythm] : heart rate and rhythm were normal [Heart Sounds] : normal S1 and S2 [Murmurs] : no murmurs present [Scattered Wheezes] : scattered wheezing [Bowel Sounds] : normal bowel sounds [Abdomen Soft] : soft [Abdomen Tenderness] : non-tender [Abnormal Walk] : normal gait [Nail Clubbing] : no clubbing of the fingernails [Cyanosis, Localized] : no localized cyanosis [Skin Color & Pigmentation] : normal skin color and pigmentation [Skin Turgor] : normal skin turgor [] : no rash [No Focal Deficits] : no focal deficits [Affect] : the affect was normal [Mood] : the mood was normal [Auscultation Breath Sounds / Voice Sounds] : lungs were clear to auscultation bilaterally [FreeTextEntry1] : No kyphosis

## 2019-04-03 NOTE — DISCUSSION/SUMMARY
[FreeTextEntry1] : ---Assessment plan----------The patient has been referred here for further opinion regarding pulmonary problem, moderate persistent asthma 2/2 viral and bacterial pneumonia with mild bronchiectasis; high suspicion for MORIAH and THAI\par \par 1- cough improved- on prednisone taper- continue inhaler daily\par \par 2- Abnormal CT - tree in bud opacities and GGO, may be due to recent viral and bacterial pneumonia. sputum cs with tsukamurella species --repeat sputum c/s x 3----ALSO REPEAT CT SCAN CHEST  - consider bronchoscopy with BAL\par \par 3- GERD - dilated esophagus and reflux seen on CT associated with worsened cough while laying flat and frequent throat clearing. Continue with omeprazole 40mg PO daily\par \par 4- MORIAH - high suspicion for MORIAH given Mallampati score and frequent nighttime awakenings. Referral for PSG given. \par 5- dm -f/u with DR Carnes- started on januvia\par \par \par f/u in june 2019 \par \par Thanks for allowing  me to participate  in the care of this patient.  Patient at this time  will follow  the above mentioned recommendations and return back for follow up visit. If you have any questions  I can be reached  at #[phone] (beeper)  or  [phone] (office).\par \par Marlys Simms MD, [facility] \par Director, Pulmonary Hypertension Program \par [facility] \par Division of Pulmonary, Critical Care and Sleep Medicine \par  Professor of Medicine \par [employer]\par

## 2019-04-03 NOTE — HISTORY OF PRESENT ILLNESS
[FreeTextEntry1] : This letter  is regarding your patient  who  attended pulmonary out patient office today.  I have reviewed  patient's  past history, social history, family history and medication list. I also  reviewed nurse practitioners/ and fellows  notes and assessment and agree with it.  \par The patient was referred after hospital discharge for asthma exacerbation 2/2 rhinovirus and superimposed bacterial pneumonia. Completed 5 days of azithromycin, 7 days of ceftriaxone/cefuroxime, and prednisone taper. Takes symbicort twice a day, using the ventolin still almost daily 2-3x/day for coughing fits, worse at night, ventolin helps. From [place], moved here 1 year ago. Had history of asthma many years ago, was well controlled, worsened during the summer here in [place]. 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 Snores at night, frequent night time awakenings, cannot sleep laying flat 2/2 cough\par \par ------No history of , fever, chills , rigors, chest pain, or hemoptysis. Nohistory of Raynaud's phenomenon. No h/o significant weight loss in last few months. No history of liver dysfunction , collagen vascular disorder or chronic thromboembolic disease. I would classify the patient's dyspnea as WHO  FUNCTIONAL CLASS II------SPUTUM---DEC 21 -GRAM POSITIVE RODS ---final identification pending\par ----Echo  date------N/A\par ----Pft date---------N/A\par ----Ct scan 11/2018------- diffuse tree in bud opacities with ground glass opacities in RLL and LLL, bronchiectasis worse in LLL\par ----Cath date------------N/A\par \par sputum cs with tsukamurella species identified\par \par \par april 2019 here for f/u visit- his cough has improved w/prednisone and advair

## 2019-05-06 ENCOUNTER — RX RENEWAL (OUTPATIENT)
Age: 69
End: 2019-05-06

## 2019-05-06 RX ORDER — OMEPRAZOLE 40 MG/1
40 CAPSULE, DELAYED RELEASE ORAL
Qty: 30 | Refills: 2 | Status: ACTIVE | COMMUNITY
Start: 2018-12-06 | End: 1900-01-01

## 2019-05-09 ENCOUNTER — FORM ENCOUNTER (OUTPATIENT)
Age: 69
End: 2019-05-09

## 2019-05-10 ENCOUNTER — APPOINTMENT (OUTPATIENT)
Dept: CT IMAGING | Facility: CLINIC | Age: 69
End: 2019-05-10
Payer: MEDICAID

## 2019-05-10 ENCOUNTER — OUTPATIENT (OUTPATIENT)
Dept: OUTPATIENT SERVICES | Facility: HOSPITAL | Age: 69
LOS: 1 days | End: 2019-05-10
Payer: MEDICAID

## 2019-05-10 DIAGNOSIS — J45.40 MODERATE PERSISTENT ASTHMA, UNCOMPLICATED: ICD-10-CM

## 2019-05-10 DIAGNOSIS — R05 COUGH: ICD-10-CM

## 2019-05-10 PROCEDURE — 71250 CT THORAX DX C-: CPT | Mod: 26

## 2019-05-10 PROCEDURE — 71250 CT THORAX DX C-: CPT

## 2019-05-20 ENCOUNTER — CHART COPY (OUTPATIENT)
Age: 69
End: 2019-05-20

## 2019-05-21 ENCOUNTER — APPOINTMENT (OUTPATIENT)
Dept: PULMONOLOGY | Facility: CLINIC | Age: 69
End: 2019-05-21
Payer: MEDICAID

## 2019-05-21 VITALS
BODY MASS INDEX: 23.78 KG/M2 | WEIGHT: 148 LBS | HEART RATE: 86 BPM | OXYGEN SATURATION: 95 % | DIASTOLIC BLOOD PRESSURE: 70 MMHG | SYSTOLIC BLOOD PRESSURE: 125 MMHG | HEIGHT: 66 IN | RESPIRATION RATE: 16 BRPM

## 2019-05-21 PROCEDURE — 94618 PULMONARY STRESS TESTING: CPT

## 2019-05-21 PROCEDURE — 94729 DIFFUSING CAPACITY: CPT

## 2019-05-21 PROCEDURE — 94726 PLETHYSMOGRAPHY LUNG VOLUMES: CPT

## 2019-05-21 PROCEDURE — 71046 X-RAY EXAM CHEST 2 VIEWS: CPT

## 2019-05-21 PROCEDURE — 94060 EVALUATION OF WHEEZING: CPT

## 2019-05-21 PROCEDURE — 99214 OFFICE O/P EST MOD 30 MIN: CPT | Mod: 25

## 2019-05-21 PROCEDURE — 99204 OFFICE O/P NEW MOD 45 MIN: CPT | Mod: 25

## 2019-05-21 RX ORDER — FLUTICASONE PROPIONATE 50 UG/1
50 SPRAY, METERED NASAL
Qty: 16 | Refills: 0 | Status: ACTIVE | COMMUNITY
Start: 2018-10-06

## 2019-05-21 RX ORDER — BUDESONIDE AND FORMOTEROL FUMARATE DIHYDRATE 160; 4.5 UG/1; UG/1
160-4.5 AEROSOL RESPIRATORY (INHALATION) TWICE DAILY
Qty: 1 | Refills: 2 | Status: DISCONTINUED | COMMUNITY
Start: 2018-12-06 | End: 2019-05-21

## 2019-05-21 RX ORDER — DEXTROMETHORPHAN HYDROBROMIDE AND PROMETHAZINE HYDROCHLORIDE 15; 6.25 MG/5ML; MG/5ML
6.25-15 SOLUTION ORAL
Qty: 300 | Refills: 0 | Status: ACTIVE | COMMUNITY
Start: 2019-02-19

## 2019-05-21 RX ORDER — BENZONATATE 200 MG/1
200 CAPSULE ORAL
Qty: 90 | Refills: 0 | Status: ACTIVE | COMMUNITY
Start: 2019-01-25

## 2019-05-21 RX ORDER — BLOOD-GLUCOSE METER
W/DEVICE EACH MISCELLANEOUS
Qty: 1 | Refills: 0 | Status: ACTIVE | COMMUNITY
Start: 2018-11-30

## 2019-05-21 NOTE — ASSESSMENT
[FreeTextEntry1] : Mr. Chapman is a 69 year old male  with a prior history of   diabetes mellitus, asthma for 10 years, though most prominent in the last year following PNA and bronchitis, abnormal CT scan of the chest who now comes in for a pulmonary evaluation.\par \par Problem 1: Severe Persistent Asthma\par - Continue Duoneb via the nebulizer TID-QID \par - Transition to Dulera 200 2 inhalations BID (instead of Breo Ellipta 200)\par - Add Tudorza 1 inhalation BID\par - Continue Singulair 10 mg QHS \par - Asthma is believed to be caused by inherited (genetic) and environmental factor, but its exact cause is unknown. Asthma may be triggered by allergens, lung infections, or irritants in the air. Asthma triggers are different for each person. \par -Inhaler technique reviewed as well as oral hygiene techniques reviewed with patient. Avoidance of cold air, extremes of temperature, rescue inhaler should be used before exercise. Order of medication reviewed with patient. Recommended use of a cool mist humidifier in the bedroom. \par \par Problem 1A: Corticosteroid- Dependent Asthma\par - Currently on prednisone 5 mg QD\par - Considering Dupixent- initiating paper work \par - Information sheet given to the patient to be reviewed, this medication is never to be used without consulting the prescribing physician. Proper dietary restraint is necessary specifically salt containing foods, if any reaction may occur should be reported. \par \par Problem 2: R/o Tracheomalacia \par - Script given for dynamic CT scan to r/o the diagnosis of TBM\par  \par Problem 3: Allergy/ PND\par - Continue Flonase 1 sniff/nostril BID\par \par Problem 4: GERD\par - Continue Omeorazole 40 mg qAM before breakfast\par - Add Zantac 300 mg QHS\par - Things to avoid including overeating, spicy foods, tight clothing, eating within three hours of bed, this list is not all inclusive. \par - For treatment of reflux, possible options discussed including diet control, H2 blockers, PPIs, as well as coating motility agents discussed as treatment options. Timing of meals and proximity of last meal to sleep were discussed. If symptoms persist, a formal gastrointestinal evaluation is needed. \par \par Problem 5: R/o OSAS\par - Due to his EDS, snoring, elevated Mallampati class, he is being recommended for a home sleep study to r/o the diagnosis of OSAS.\par - Sleep apnea is associated with adverse clinical consequences which an affect most organ systems. Cardiovascular disease risk includes arrhythmias, atrial fibrillation, hypertension, coronary artery disease, and stroke. Metabolic disorders include diabetes type 2, non-alcoholic fatty liver disease. Mood disorder especially depression; and cognitive decline especially in the elderly. Associations with chronic reflux/Batista’s esophagus some but not all inclusive. \par -Reasons include arousal consistent with hypopnea; respiratory events most prominent in REM sleep or supine position; therefore sleep staging and body position are important for accurate diagnosis and estimation of AHI. \par \par Problem 6: Abnormal CT most c/w Inflammation \par - Pending dynamic CT scan of the chest \par - F/u chest CT scan in 6 months \par \par Problem 7: Health maintenance \par -s/p flu shot \par -recommended strep pneumonia vaccines: Prevnar-13 vaccine, followed by Pneumo vaccine 23 one year following\par -recommended early intervention for URIs\par -recommended regular osteoporosis evaluations\par -recommended early dermatological evaluations\par -recommended after the age of 50 to the age of 70, colonoscopy every 5 years\par \par \par f/u in 6-8 weeks\par pt is encouraged to call or fax the office with any questions or concerns. \par Explained to the pt in full detail with demonstrations how to use the inhalers and inhaler hygiene. \par -Education provided to the PT regarding their visit and conditions.

## 2019-05-21 NOTE — PROCEDURE
[FreeTextEntry1] : PFT- spi reveals mild to moderate restrictive dysfunction; FEV1 was L which is % of predicted- there was a 22% improvement with bronchodilator at mid to low lung volumes; low normal lung volumes; normal diffusion at 20.3, which is 113% of predicted; normal flow volume loop \par \par ACT survey completed in office (5/21/2019) revealed a score of 16\par \par Patient completed a 6-minute walk/exercise study in which the Lowest Pulse Ox was 93%; there was evidence of slight dyspnea. The patient walked 25 laps or 407.5 meters \par \par Chest CT (5/10/2019) revealed prior b/l lower lobe opacities and nodules have resolved. Right upper lobe 7 mm ground glass nodule may be new from prior study. 3-month follow up CT recommended for complete evaluation. \par \par Bloodwork (4/24/2019) revealed WBC-10.7; HGB-12.3; PLT- 265; Eosinophils- .02; Glucose 327; cholesterol 211; HDL 88; HGB- A1c 8.4; Vitamin D- 32.95

## 2019-05-21 NOTE — PHYSICAL EXAM
[General Appearance - Well Developed] : well developed [Normal Appearance] : normal appearance [Well Groomed] : well groomed [General Appearance - Well Nourished] : well nourished [No Deformities] : no deformities [General Appearance - In No Acute Distress] : no acute distress [Normal Conjunctiva] : the conjunctiva exhibited no abnormalities [Eyelids - No Xanthelasma] : the eyelids demonstrated no xanthelasmas [Normal Oropharynx] : normal oropharynx [II] : II [Neck Appearance] : the appearance of the neck was normal [Neck Cervical Mass (___cm)] : no neck mass was observed [Jugular Venous Distention Increased] : there was no jugular-venous distention [Thyroid Diffuse Enlargement] : the thyroid was not enlarged [Thyroid Nodule] : there were no palpable thyroid nodules [Heart Rate And Rhythm] : heart rate and rhythm were normal [Heart Sounds] : normal S1 and S2 [Murmurs] : no murmurs present [Respiration, Rhythm And Depth] : normal respiratory rhythm and effort [Exaggerated Use Of Accessory Muscles For Inspiration] : no accessory muscle use [Abdomen Soft] : soft [Abdomen Tenderness] : non-tender [Abdomen Mass (___ Cm)] : no abdominal mass palpated [Abnormal Walk] : normal gait [Gait - Sufficient For Exercise Testing] : the gait was sufficient for exercise testing [Nail Clubbing] : no clubbing of the fingernails [Cyanosis, Localized] : no localized cyanosis [Petechial Hemorrhages (___cm)] : no petechial hemorrhages [Skin Color & Pigmentation] : normal skin color and pigmentation [Skin Turgor] : normal skin turgor [] : no rash [Deep Tendon Reflexes (DTR)] : deep tendon reflexes were 2+ and symmetric [Sensation] : the sensory exam was normal to light touch and pinprick [No Focal Deficits] : no focal deficits [Oriented To Time, Place, And Person] : oriented to person, place, and time [Impaired Insight] : insight and judgment were intact [Affect] : the affect was normal [FreeTextEntry1] : I:E 1:3, very mild forced expiratory wheeze
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
## 2019-05-21 NOTE — ADDENDUM
[FreeTextEntry1] : Documented by Juno Laws acting as a scribe for Dr. Antonio Reyes on 5/21/2019\par \par All medical record entries made by the Scribe were at my, Dr. Antonio Reyes's, direction and personally dictated by me on 5/21/2019. I have reviewed the chart and agree that the record accurately reflects my personal performance of the history, physical exam, assessment and plan. I have also personally directed, reviewed, and agree with the discharge instructions. \par \par \par \par \par

## 2019-05-28 ENCOUNTER — RX CHANGE (OUTPATIENT)
Age: 69
End: 2019-05-28

## 2019-05-28 RX ORDER — MOMETASONE FUROATE AND FORMOTEROL FUMARATE DIHYDRATE 200; 5 UG/1; UG/1
200-5 AEROSOL RESPIRATORY (INHALATION)
Qty: 3 | Refills: 1 | Status: DISCONTINUED | COMMUNITY
Start: 2019-05-21 | End: 2019-05-28

## 2019-05-28 RX ORDER — ACLIDINIUM BROMIDE 400 UG/1
400 POWDER, METERED RESPIRATORY (INHALATION) TWICE DAILY
Qty: 3 | Refills: 1 | Status: DISCONTINUED | COMMUNITY
Start: 2019-05-21 | End: 2019-05-28

## 2019-05-28 RX ORDER — RANITIDINE 300 MG/1
300 TABLET ORAL
Qty: 90 | Refills: 1 | Status: DISCONTINUED | COMMUNITY
Start: 2019-05-21 | End: 2019-05-28

## 2019-06-03 ENCOUNTER — TRANSCRIPTION ENCOUNTER (OUTPATIENT)
Age: 69
End: 2019-06-03

## 2019-06-11 ENCOUNTER — APPOINTMENT (OUTPATIENT)
Dept: PULMONOLOGY | Facility: CLINIC | Age: 69
End: 2019-06-11

## 2019-06-14 ENCOUNTER — TRANSCRIPTION ENCOUNTER (OUTPATIENT)
Age: 69
End: 2019-06-14

## 2019-06-20 ENCOUNTER — APPOINTMENT (OUTPATIENT)
Dept: ENDOCRINOLOGY | Facility: CLINIC | Age: 69
End: 2019-06-20
Payer: COMMERCIAL

## 2019-06-20 VITALS
HEART RATE: 107 BPM | SYSTOLIC BLOOD PRESSURE: 136 MMHG | BODY MASS INDEX: 23.78 KG/M2 | DIASTOLIC BLOOD PRESSURE: 60 MMHG | OXYGEN SATURATION: 96 % | WEIGHT: 148 LBS | HEIGHT: 66 IN

## 2019-06-20 LAB — GLUCOSE BLDC GLUCOMTR-MCNC: 310

## 2019-06-20 PROCEDURE — 99215 OFFICE O/P EST HI 40 MIN: CPT | Mod: 25

## 2019-06-20 PROCEDURE — 82962 GLUCOSE BLOOD TEST: CPT

## 2019-06-25 ENCOUNTER — RX RENEWAL (OUTPATIENT)
Age: 69
End: 2019-06-25

## 2019-06-25 ENCOUNTER — TRANSCRIPTION ENCOUNTER (OUTPATIENT)
Age: 69
End: 2019-06-25

## 2019-07-02 RX ORDER — INSULIN LISPRO 100 U/ML
100 INJECTION, SOLUTION SUBCUTANEOUS
Qty: 1 | Refills: 1 | Status: ACTIVE | COMMUNITY
Start: 2019-07-02 | End: 1900-01-01

## 2019-07-02 RX ORDER — INSULIN ASPART 100 [IU]/ML
100 INJECTION, SOLUTION INTRAVENOUS; SUBCUTANEOUS
Qty: 1 | Refills: 2 | Status: DISCONTINUED | COMMUNITY
Start: 2019-06-25 | End: 2019-07-02

## 2019-07-09 ENCOUNTER — NON-APPOINTMENT (OUTPATIENT)
Age: 69
End: 2019-07-09

## 2019-07-09 ENCOUNTER — APPOINTMENT (OUTPATIENT)
Dept: PULMONOLOGY | Facility: CLINIC | Age: 69
End: 2019-07-09
Payer: MEDICAID

## 2019-07-09 VITALS
HEIGHT: 66 IN | DIASTOLIC BLOOD PRESSURE: 60 MMHG | OXYGEN SATURATION: 99 % | SYSTOLIC BLOOD PRESSURE: 128 MMHG | WEIGHT: 144 LBS | RESPIRATION RATE: 16 BRPM | HEART RATE: 94 BPM | BODY MASS INDEX: 23.14 KG/M2

## 2019-07-09 PROCEDURE — 99214 OFFICE O/P EST MOD 30 MIN: CPT | Mod: 25

## 2019-07-09 PROCEDURE — 94010 BREATHING CAPACITY TEST: CPT

## 2019-07-09 RX ORDER — FLUTICASONE FUROATE AND VILANTEROL TRIFENATATE 100; 25 UG/1; UG/1
100-25 POWDER RESPIRATORY (INHALATION)
Qty: 1 | Refills: 2 | Status: DISCONTINUED | COMMUNITY
Start: 2018-12-07 | End: 2019-07-09

## 2019-07-09 RX ORDER — TIOTROPIUM BROMIDE AND OLODATEROL 3.124; 2.736 UG/1; UG/1
2.5-2.5 SPRAY, METERED RESPIRATORY (INHALATION)
Qty: 3 | Refills: 1 | Status: DISCONTINUED | COMMUNITY
Start: 2019-05-28 | End: 2019-07-09

## 2019-07-19 ENCOUNTER — RX RENEWAL (OUTPATIENT)
Age: 69
End: 2019-07-19

## 2019-07-19 RX ORDER — PEN NEEDLE, DIABETIC 29 G X1/2"
32G X 4 MM NEEDLE, DISPOSABLE MISCELLANEOUS
Qty: 1 | Refills: 5 | Status: ACTIVE | COMMUNITY
Start: 2019-06-25 | End: 1900-01-01

## 2019-07-19 RX ORDER — LANCETS 30 GAUGE
EACH MISCELLANEOUS
Qty: 2 | Refills: 5 | Status: ACTIVE | COMMUNITY
Start: 2019-02-14 | End: 1900-01-01

## 2019-08-22 ENCOUNTER — FORM ENCOUNTER (OUTPATIENT)
Age: 69
End: 2019-08-22

## 2019-08-23 ENCOUNTER — APPOINTMENT (OUTPATIENT)
Dept: CT IMAGING | Facility: CLINIC | Age: 69
End: 2019-08-23
Payer: MEDICAID

## 2019-08-23 ENCOUNTER — OUTPATIENT (OUTPATIENT)
Dept: OUTPATIENT SERVICES | Facility: HOSPITAL | Age: 69
LOS: 1 days | End: 2019-08-23
Payer: MEDICAID

## 2019-08-23 DIAGNOSIS — J39.8 OTHER SPECIFIED DISEASES OF UPPER RESPIRATORY TRACT: ICD-10-CM

## 2019-08-23 PROCEDURE — 71250 CT THORAX DX C-: CPT

## 2019-08-23 PROCEDURE — 71250 CT THORAX DX C-: CPT | Mod: 26

## 2019-09-19 ENCOUNTER — APPOINTMENT (OUTPATIENT)
Dept: ENDOCRINOLOGY | Facility: CLINIC | Age: 69
End: 2019-09-19
Payer: MEDICAID

## 2019-09-19 VITALS
SYSTOLIC BLOOD PRESSURE: 136 MMHG | WEIGHT: 150 LBS | DIASTOLIC BLOOD PRESSURE: 70 MMHG | HEIGHT: 66 IN | BODY MASS INDEX: 24.11 KG/M2 | OXYGEN SATURATION: 98 % | HEART RATE: 94 BPM

## 2019-09-19 PROCEDURE — 99214 OFFICE O/P EST MOD 30 MIN: CPT

## 2019-10-01 NOTE — PHYSICAL EXAM
[Normal Appearance] : normal appearance [General Appearance - Well Developed] : well developed [Well Groomed] : well groomed [General Appearance - Well Nourished] : well nourished [No Deformities] : no deformities [General Appearance - In No Acute Distress] : no acute distress [Normal Conjunctiva] : the conjunctiva exhibited no abnormalities [Eyelids - No Xanthelasma] : the eyelids demonstrated no xanthelasmas [Normal Oropharynx] : normal oropharynx [Neck Appearance] : the appearance of the neck was normal [II] : II [Neck Cervical Mass (___cm)] : no neck mass was observed [Jugular Venous Distention Increased] : there was no jugular-venous distention [Thyroid Diffuse Enlargement] : the thyroid was not enlarged [Thyroid Nodule] : there were no palpable thyroid nodules [Heart Rate And Rhythm] : heart rate and rhythm were normal [Heart Sounds] : normal S1 and S2 [Murmurs] : no murmurs present [Respiration, Rhythm And Depth] : normal respiratory rhythm and effort [Exaggerated Use Of Accessory Muscles For Inspiration] : no accessory muscle use [Auscultation Breath Sounds / Voice Sounds] : lungs were clear to auscultation bilaterally [Abdomen Soft] : soft [Abdomen Tenderness] : non-tender [Abnormal Walk] : normal gait [Abdomen Mass (___ Cm)] : no abdominal mass palpated [Gait - Sufficient For Exercise Testing] : the gait was sufficient for exercise testing [Nail Clubbing] : no clubbing of the fingernails [Cyanosis, Localized] : no localized cyanosis [Petechial Hemorrhages (___cm)] : no petechial hemorrhages [Deep Tendon Reflexes (DTR)] : deep tendon reflexes were 2+ and symmetric [Sensation] : the sensory exam was normal to light touch and pinprick [No Focal Deficits] : no focal deficits [Oriented To Time, Place, And Person] : oriented to person, place, and time [Impaired Insight] : insight and judgment were intact [Affect] : the affect was normal [Skin Turgor] : normal skin turgor [] : no rash [Skin Color & Pigmentation] : normal skin color and pigmentation [Skin Lesions] : no skin lesions [No Venous Stasis] : no venous stasis [No Skin Ulcers] : no skin ulcer [No Xanthoma] : no  xanthoma was observed [FreeTextEntry1] : I:E 1:3, clear

## 2019-10-01 NOTE — HISTORY OF PRESENT ILLNESS
[FreeTextEntry1] : Mr. Chapman is a 69 year old male coming into the office today for a follow up re-evaluation. His chief complaint is insurance issues.\par -he reports feeling generally well\par -he notes having a cough when the temperature changes, especially in June\par -he reports he is sleeping well\par -he reports his energy level is good, and remains active outside in his garden\par -he reports he is using his inhalers regularly\par -He notes His bowels are regular \par -he is still on 5 mg of steroids\par -his daughter notes he has been having insurance issues\par -he denies any snoring, chest pain, chest pressure, diarrhea, constipation, dysphagia, dizziness, PND, leg swelling, leg pain, itchy eyes, itchy ears, heartburn, reflux, sour taste in the mouth

## 2019-10-01 NOTE — ASSESSMENT
[FreeTextEntry1] : Mr. Chapman is a 69 year old male  with a prior history of   diabetes mellitus, asthma for 10 years, though most prominent in the last year following PNA and bronchitis, abnormal CT scan of the chest who now comes in for a pulmonary re-evaluation.\par \par Problem 1: Severe Persistent Asthma (eosinophilic asthma)\par -The safety and efficacy of Nucala was established in three double-blind, randomized, placebo-controlled trials in patients with severe asthma. Compared to a placebo, patients with severe asthma receiving Nucala had fewer exacerbation requiring hospitalization and/or emergency department visits, and a longer time to first exacerbation. In addition, patients with severe asthma receiving Nucala or Fasenra experienced greater reductions in their daily maintenance oral corticosteroid dose, while maintaining asthma control compared with patients receiving placebo. Treatment with Nucala did not result in a significant improvement in lung function, as measured by the volume of air exhaled by patients in one second. The most common side effects include: headache, injection site reactions, back pain, weakness, and fatigue; hypersensitivity reactions can occur within hours or days including swelling of the face, mouth, and tongue, fainting, dizziness, hives, breathing problems, and rash; herpes zoster infections have occurred. The drug is a monoclonal antibody that inhibits interleukin -5 which helps regular eosinophils, a type of white blood cell that contributes to asthma. The over-production of eosinophils can cause inflammation in the lungs, increasing the frequency of asthma attacks. Patients must also take other medications, including high dose inhaled corticosteroids and at least one additional asthma drug \par - Continue Duoneb via the nebulizer TID-QID \par - Add Spiriva 1 inhalation QD\par - continue Tudorza 1 inhalation BID\par - Continue Singulair 10 mg QHS \par - Asthma is believed to be caused by inherited (genetic) and environmental factor, but its exact cause is unknown. Asthma may be triggered by allergens, lung infections, or irritants in the air. Asthma triggers are different for each person. \par -Inhaler technique reviewed as well as oral hygiene techniques reviewed with patient. Avoidance of cold air, extremes of temperature, rescue inhaler should be used before exercise. Order of medication reviewed with patient. Recommended use of a cool mist humidifier in the bedroom. \par \par Problem 1A: Corticosteroid- Dependent Asthma\par - Currently on prednisone 5 mg QD\par - Considering Dupixent- initiating paper work (not a candidate) \par - Information sheet given to the patient to be reviewed, this medication is never to be used without consulting the prescribing physician. Proper dietary restraint is necessary specifically salt containing foods, if any reaction may occur should be reported. \par \par Problem 2: R/o Tracheomalacia \par - Script given for dynamic CT scan to r/o the diagnosis of TBM\par  \par Problem 3: Allergy/ PND\par - Continue Flonase 1 sniff/nostril BID\par \par Problem 4: GERD\par - Continue Omeorazole 40 mg qAM before breakfast\par - continue Zantac 300 mg QHS\par - Things to avoid including overeating, spicy foods, tight clothing, eating within three hours of bed, this list is not all inclusive. \par - For treatment of reflux, possible options discussed including diet control, H2 blockers, PPIs, as well as coating motility agents discussed as treatment options. Timing of meals and proximity of last meal to sleep were discussed. If symptoms persist, a formal gastrointestinal evaluation is needed. \par \par Problem 5: R/o OSAS\par - Due to his EDS, snoring, elevated Mallampati class, he is being recommended for a home sleep study to r/o the diagnosis of OSAS.\par - Sleep apnea is associated with adverse clinical consequences which an affect most organ systems. Cardiovascular disease risk includes arrhythmias, atrial fibrillation, hypertension, coronary artery disease, and stroke. Metabolic disorders include diabetes type 2, non-alcoholic fatty liver disease. Mood disorder especially depression; and cognitive decline especially in the elderly. Associations with chronic reflux/Batista’s esophagus some but not all inclusive. \par -Reasons include arousal consistent with hypopnea; respiratory events most prominent in REM sleep or supine position; therefore sleep staging and body position are important for accurate diagnosis and estimation of AHI. \par \par Problem 6: Abnormal CT most c/w Inflammation \par - Pending dynamic CT scan of the chest \par - F/u chest CT scan in 6 months \par \par Problem 7: Health maintenance \par -s/p flu shot \par -recommended strep pneumonia vaccines: Prevnar-13 vaccine, followed by Pneumo vaccine 23 one year following\par -recommended early intervention for URIs\par -recommended regular osteoporosis evaluations\par -recommended early dermatological evaluations\par -recommended after the age of 50 to the age of 70, colonoscopy every 5 years\par \par \par f/u in 3 months with SPI and NiOx\par pt is encouraged to call or fax the office with any questions or concerns. \par Explained to the pt in full detail with demonstrations how to use the inhalers and inhaler hygiene. \par -Education provided to the PT regarding their visit and conditions.

## 2019-10-01 NOTE — ADDENDUM
[FreeTextEntry1] : Documented by Domingo Rodriguez acting as a scribe for Dr. Antonio Reyes on 07/09/2019.\par \par All medical record entries made by the Scribe were at my, Dr. Antonio Reyes's, direction and personally dictated by me on 07/09/2019. I have reviewed the chart and agree that the record accurately reflects my personal performance of the history, physical exam, assessment and plan. I have also personally directed, reviewed, and agree with the discharge instructions. \par

## 2019-10-01 NOTE — PROCEDURE
[FreeTextEntry1] : PFT revealed mild restrictive dysfunction, with a FEV1 of 1.99L, which is 72% of predicted, with a normal flow volume loop \par \par Patient was unable to complete Feno / NiOx test.

## 2019-10-01 NOTE — REASON FOR VISIT
[Family Member] : family member [Follow-Up] : a follow-up visit [FreeTextEntry1] : severe persistent corticosteroid dependent Asthma, SOB, allergy

## 2019-10-07 ENCOUNTER — RX RENEWAL (OUTPATIENT)
Age: 69
End: 2019-10-07

## 2019-10-07 ENCOUNTER — TRANSCRIPTION ENCOUNTER (OUTPATIENT)
Age: 69
End: 2019-10-07

## 2019-10-07 RX ORDER — BLOOD SUGAR DIAGNOSTIC
STRIP MISCELLANEOUS
Qty: 2 | Refills: 5 | Status: ACTIVE | COMMUNITY
Start: 2019-02-14 | End: 1900-01-01

## 2019-10-15 DIAGNOSIS — Z29.9 ENCOUNTER FOR PROPHYLACTIC MEASURES, UNSPECIFIED: ICD-10-CM

## 2019-10-15 RX ORDER — EPINEPHRINE 0.3 MG/.3ML
0.3 INJECTION INTRAMUSCULAR
Qty: 1 | Refills: 0 | Status: ACTIVE | COMMUNITY
Start: 2019-10-15 | End: 1900-01-01

## 2019-10-24 ENCOUNTER — APPOINTMENT (OUTPATIENT)
Dept: PULMONOLOGY | Facility: CLINIC | Age: 69
End: 2019-10-24
Payer: MEDICAID

## 2019-10-24 ENCOUNTER — NON-APPOINTMENT (OUTPATIENT)
Age: 69
End: 2019-10-24

## 2019-10-24 VITALS
WEIGHT: 150 LBS | BODY MASS INDEX: 24.11 KG/M2 | HEART RATE: 95 BPM | HEIGHT: 66 IN | SYSTOLIC BLOOD PRESSURE: 138 MMHG | OXYGEN SATURATION: 97 % | DIASTOLIC BLOOD PRESSURE: 60 MMHG | RESPIRATION RATE: 16 BRPM

## 2019-10-24 PROCEDURE — 94010 BREATHING CAPACITY TEST: CPT

## 2019-10-24 PROCEDURE — 96372 THER/PROPH/DIAG INJ SC/IM: CPT

## 2019-10-24 PROCEDURE — 99214 OFFICE O/P EST MOD 30 MIN: CPT | Mod: 25

## 2019-10-24 RX ORDER — MEPOLIZUMAB 100 MG/ML
100 INJECTION, POWDER, FOR SOLUTION SUBCUTANEOUS
Qty: 0 | Refills: 0 | Status: COMPLETED | OUTPATIENT
Start: 2019-10-24

## 2019-10-24 RX ADMIN — MEPOLIZUMAB 100 MG: 100 INJECTION, POWDER, FOR SOLUTION SUBCUTANEOUS at 00:00

## 2019-10-24 NOTE — ASSESSMENT
[FreeTextEntry1] : Mr. Chapman is a 69 year old male  with a prior history of   diabetes mellitus, asthma for 10 years/ eosinophilic asthma, though most prominent in the last year following PNA and bronchitis, abnormal CT scan of the chest who now comes in for a pulmonary re-evaluation. His asthma is currently active\par \par Problem 1: Severe Persistent Asthma (eosinophilic asthma) -acive\par -Nucala initiated today -(10/24/2019)\par - Continue Duoneb via the nebulizer TID-QID \par -add Utibron 1 inhalation BID\par -add Arnuity 200 \par - Continue Singulair 10 mg QHS \par - Asthma is believed to be caused by inherited (genetic) and environmental factor, but its exact cause is unknown. Asthma may be triggered by allergens, lung infections, or irritants in the air. Asthma triggers are different for each person. \par -Inhaler technique reviewed as well as oral hygiene techniques reviewed with patient. Avoidance of cold air, extremes of temperature, rescue inhaler should be used before exercise. Order of medication reviewed with patient. Recommended use of a cool mist humidifier in the bedroom. \par -The safety and efficacy of Nucala was established in three double-blind, randomized, placebo-controlled trials in patients with severe asthma. Compared to a placebo, patients with severe asthma receiving Nucala had fewer exacerbation requiring hospitalization and/or emergency department visits, and a longer time to first exacerbation. In addition, patients with severe asthma receiving Nucala or Fasenra experienced greater reductions in their daily maintenance oral corticosteroid dose, while maintaining asthma control compared with patients receiving placebo. Treatment with Nucala did not result in a significant improvement in lung function, as measured by the volume of air exhaled by patients in one second. The most common side effects include: headache, injection site reactions, back pain, weakness, and fatigue; hypersensitivity reactions can occur within hours or days including swelling of the face, mouth, and tongue, fainting, dizziness, hives, breathing problems, and rash; herpes zoster infections have occurred. The drug is a monoclonal antibody that inhibits interleukin -5 which helps regular eosinophils, a type of white blood cell that contributes to asthma. The over-production of eosinophils can cause inflammation in the lungs, increasing the frequency of asthma attacks. Patients must also take other medications, including high dose inhaled corticosteroids and at least one additional asthma drug \par \par Problem 1A: Corticosteroid- Dependent Asthma\par - Currently on prednisone 5 mg QD - begin to taper once on Nucala for 1 month\par - Considering Dupixent- initiating paper work (not a candidate) \par - Information sheet given to the patient to be reviewed, this medication is never to be used without consulting the prescribing physician. Proper dietary restraint is necessary specifically salt containing foods, if any reaction may occur should be reported. \par \par Problem 2: no tracheomalacia present\par - s/p Dynamic CT scan (-)\par  \par Problem 3: Allergy/ PND\par - Continue Flonase 1 sniff/nostril BID\par \par Problem 4: GERD\par - Continue Omeorazole 40 mg qAM before breakfast\par -add Pepcid 40 mg QHS \par - Things to avoid including overeating, spicy foods, tight clothing, eating within three hours of bed, this list is not all inclusive. \par - For treatment of reflux, possible options discussed including diet control, H2 blockers, PPIs, as well as coating motility agents discussed as treatment options. Timing of meals and proximity of last meal to sleep were discussed. If symptoms persist, a formal gastrointestinal evaluation is needed. \par \par Problem 5: R/o OSAS\par - Due to his EDS, snoring, elevated Mallampati class, he is being recommended for a home sleep study to r/o the diagnosis of OSAS.\par - Sleep apnea is associated with adverse clinical consequences which an affect most organ systems. Cardiovascular disease risk includes arrhythmias, atrial fibrillation, hypertension, coronary artery disease, and stroke. Metabolic disorders include diabetes type 2, non-alcoholic fatty liver disease. Mood disorder especially depression; and cognitive decline especially in the elderly. Associations with chronic reflux/Batista’s esophagus some but not all inclusive. \par -Reasons include arousal consistent with hypopnea; respiratory events most prominent in REM sleep or supine position; therefore sleep staging and body position are important for accurate diagnosis and estimation of AHI. \par \par Problem 6: Abnormal CT most c/w Inflammation -(resolved)\par - F/u chest CT scan September 2020 \par \par Problem 7: Health maintenance \par -s/p flu shot (2019)\par -recommended strep pneumonia vaccines: Prevnar-13 vaccine, followed by Pneumo vaccine 23 one year following\par -recommended early intervention for URIs\par -recommended regular osteoporosis evaluations\par -recommended early dermatological evaluations\par -recommended after the age of 50 to the age of 70, colonoscopy every 5 years\par \par \par f/u in 3 months with SPI and NiOx\par pt is encouraged to call or fax the office with any questions or concerns. \par Explained to the pt in full detail with demonstrations how to use the inhalers and inhaler hygiene. \par -Education provided to the PT regarding their visit and conditions.

## 2019-10-24 NOTE — HISTORY OF PRESENT ILLNESS
[FreeTextEntry1] : Mr. Chapman is a 69 year old male with a history of severe persistent corticosteroid dependent Asthma, SOB, allergy presenting to the office today for a follow up visit. His chief complaint is productive cough.\par -He states that he has generally been feeling well  although he has been bringing up some yellow mucus with his cough\par -he states that he has been having some chest pain\par -he reports that his leg often itches \par -he states that he was feeling generally well, so he stopped using his prednisone for about 1 week. after this week, he began to cough and restarted his prednisone (5 mg QD)\par -he denies any headaches, nausea, vomiting, fever, chills, sweats, chest pain, chest pressure, diarrhea, constipation, dysphagia, dizziness, leg swelling, leg pain, itchy eyes, itchy ears, heartburn, reflux, or sour taste in the mouth, palpitations.

## 2019-10-24 NOTE — REASON FOR VISIT
[Follow-Up] : a follow-up visit [Family Member] : family member [FreeTextEntry1] : severe persistent corticosteroid dependent Asthma, SOB, allergy

## 2019-10-24 NOTE — PROCEDURE
[FreeTextEntry1] : Dynamic Chest CT (August 23, 2019) reveals mild tracheobronchial secretions. No excessive tracheobronchial narrowing with dynamic expiration. Resolution prior right upper lobe ground glass nodule. There are atherosclerotic calcifications of the aorta and coronary arteries.\par \par PFT - spi reveals mild restrictive dysfunction ; FEV1 is 1.92 which is 69% of predicted, normal flow volume loop \par \par *Unable to complete FeNO*\par \par Blood work (August 25, 2019) reveals:\par WBC 8.2\par HGB 12.8\par HCT 39.4\par \par Eosinophil 2.9\par CMP wnl\par Total cholesterol 203\par HDL 76\par Triglycerides 106\par HgA1c 7.0\par Vitamin D 25-hydroxy 23.4 (low)

## 2019-10-24 NOTE — ADDENDUM
[FreeTextEntry1] : All medical record entries made by taiwo Sorto were at Dr. Antonio Reyes's direction and personally dictated by me on 10/24/2019. I have reviewed the chart and agree that the record accurately reflects my personal performance of the history, physical exam, assessment and plan. I have also personally directed, reviewed, and agree with the discharge instructions. \par

## 2019-10-24 NOTE — PHYSICAL EXAM
[General Appearance - Well Developed] : well developed [Normal Appearance] : normal appearance [Well Groomed] : well groomed [No Deformities] : no deformities [General Appearance - Well Nourished] : well nourished [General Appearance - In No Acute Distress] : no acute distress [Normal Conjunctiva] : the conjunctiva exhibited no abnormalities [Eyelids - No Xanthelasma] : the eyelids demonstrated no xanthelasmas [Normal Oropharynx] : normal oropharynx [Neck Appearance] : the appearance of the neck was normal [II] : II [Jugular Venous Distention Increased] : there was no jugular-venous distention [Neck Cervical Mass (___cm)] : no neck mass was observed [Thyroid Diffuse Enlargement] : the thyroid was not enlarged [Thyroid Nodule] : there were no palpable thyroid nodules [Heart Rate And Rhythm] : heart rate and rhythm were normal [Heart Sounds] : normal S1 and S2 [Murmurs] : no murmurs present [Respiration, Rhythm And Depth] : normal respiratory rhythm and effort [Exaggerated Use Of Accessory Muscles For Inspiration] : no accessory muscle use [Abdomen Soft] : soft [Abdomen Tenderness] : non-tender [Abdomen Mass (___ Cm)] : no abdominal mass palpated [Abnormal Walk] : normal gait [Gait - Sufficient For Exercise Testing] : the gait was sufficient for exercise testing [Cyanosis, Localized] : no localized cyanosis [Nail Clubbing] : no clubbing of the fingernails [Petechial Hemorrhages (___cm)] : no petechial hemorrhages [No Venous Stasis] : no venous stasis [Skin Lesions] : no skin lesions [No Skin Ulcers] : no skin ulcer [No Xanthoma] : no  xanthoma was observed [Sensation] : the sensory exam was normal to light touch and pinprick [Deep Tendon Reflexes (DTR)] : deep tendon reflexes were 2+ and symmetric [No Focal Deficits] : no focal deficits [Impaired Insight] : insight and judgment were intact [Oriented To Time, Place, And Person] : oriented to person, place, and time [Affect] : the affect was normal [Skin Color & Pigmentation] : normal skin color and pigmentation [Skin Turgor] : normal skin turgor [] : no rash [FreeTextEntry1] : I:E 1:3,  mild expiratory wheeze

## 2019-10-29 ENCOUNTER — APPOINTMENT (OUTPATIENT)
Dept: PULMONOLOGY | Facility: CLINIC | Age: 69
End: 2019-10-29

## 2019-11-01 ENCOUNTER — RX CHANGE (OUTPATIENT)
Age: 69
End: 2019-11-01

## 2019-11-01 ENCOUNTER — TRANSCRIPTION ENCOUNTER (OUTPATIENT)
Age: 69
End: 2019-11-01

## 2019-11-01 RX ORDER — ALOGLIPTIN 25 MG/1
25 TABLET, FILM COATED ORAL DAILY
Qty: 30 | Refills: 5 | Status: COMPLETED | COMMUNITY
Start: 2019-02-20 | End: 2019-11-01

## 2019-11-25 ENCOUNTER — APPOINTMENT (OUTPATIENT)
Dept: PULMONOLOGY | Facility: CLINIC | Age: 69
End: 2019-11-25
Payer: MEDICAID

## 2019-11-25 VITALS
SYSTOLIC BLOOD PRESSURE: 140 MMHG | OXYGEN SATURATION: 97 % | RESPIRATION RATE: 17 BRPM | WEIGHT: 156 LBS | BODY MASS INDEX: 25.07 KG/M2 | DIASTOLIC BLOOD PRESSURE: 70 MMHG | HEART RATE: 88 BPM | HEIGHT: 66 IN

## 2019-11-25 PROCEDURE — 96372 THER/PROPH/DIAG INJ SC/IM: CPT

## 2019-11-25 RX ORDER — MEPOLIZUMAB 100 MG/ML
100 INJECTION, POWDER, FOR SOLUTION SUBCUTANEOUS
Qty: 0 | Refills: 0 | Status: COMPLETED | OUTPATIENT
Start: 2019-11-25

## 2019-11-25 RX ADMIN — MEPOLIZUMAB 1 MG: 100 INJECTION, POWDER, FOR SOLUTION SUBCUTANEOUS at 00:00

## 2019-12-08 ENCOUNTER — RX RENEWAL (OUTPATIENT)
Age: 69
End: 2019-12-08

## 2019-12-09 ENCOUNTER — TRANSCRIPTION ENCOUNTER (OUTPATIENT)
Age: 69
End: 2019-12-09

## 2019-12-09 ENCOUNTER — MEDICATION RENEWAL (OUTPATIENT)
Age: 69
End: 2019-12-09

## 2019-12-30 ENCOUNTER — TRANSCRIPTION ENCOUNTER (OUTPATIENT)
Age: 69
End: 2019-12-30

## 2019-12-30 ENCOUNTER — NON-APPOINTMENT (OUTPATIENT)
Age: 69
End: 2019-12-30

## 2019-12-30 ENCOUNTER — APPOINTMENT (OUTPATIENT)
Dept: PULMONOLOGY | Facility: CLINIC | Age: 69
End: 2019-12-30
Payer: MEDICAID

## 2019-12-30 VITALS
SYSTOLIC BLOOD PRESSURE: 118 MMHG | BODY MASS INDEX: 24.27 KG/M2 | OXYGEN SATURATION: 98 % | RESPIRATION RATE: 17 BRPM | HEART RATE: 87 BPM | DIASTOLIC BLOOD PRESSURE: 70 MMHG | WEIGHT: 151 LBS | HEIGHT: 66 IN

## 2019-12-30 PROCEDURE — 94010 BREATHING CAPACITY TEST: CPT

## 2019-12-30 PROCEDURE — 96372 THER/PROPH/DIAG INJ SC/IM: CPT

## 2019-12-30 PROCEDURE — 99214 OFFICE O/P EST MOD 30 MIN: CPT | Mod: 25

## 2019-12-30 RX ORDER — LORATADINE 10 MG/1
10 TABLET ORAL
Qty: 90 | Refills: 1 | Status: ACTIVE | COMMUNITY
Start: 2019-12-30 | End: 1900-01-01

## 2019-12-30 RX ORDER — MEPOLIZUMAB 100 MG/ML
100 INJECTION, POWDER, FOR SOLUTION SUBCUTANEOUS
Refills: 0 | Status: COMPLETED | OUTPATIENT
Start: 2019-12-30

## 2019-12-30 RX ADMIN — MEPOLIZUMAB 1 MG: 100 INJECTION, POWDER, FOR SOLUTION SUBCUTANEOUS at 00:00

## 2019-12-30 NOTE — PHYSICAL EXAM
[General Appearance - Well Developed] : well developed [Normal Appearance] : normal appearance [Well Groomed] : well groomed [General Appearance - Well Nourished] : well nourished [No Deformities] : no deformities [General Appearance - In No Acute Distress] : no acute distress [Normal Conjunctiva] : the conjunctiva exhibited no abnormalities [Eyelids - No Xanthelasma] : the eyelids demonstrated no xanthelasmas [Normal Oropharynx] : normal oropharynx [III] : III [Neck Cervical Mass (___cm)] : no neck mass was observed [Neck Appearance] : the appearance of the neck was normal [Thyroid Nodule] : there were no palpable thyroid nodules [Jugular Venous Distention Increased] : there was no jugular-venous distention [Thyroid Diffuse Enlargement] : the thyroid was not enlarged [Murmurs] : no murmurs present [Heart Rate And Rhythm] : heart rate and rhythm were normal [Heart Sounds] : normal S1 and S2 [Exaggerated Use Of Accessory Muscles For Inspiration] : no accessory muscle use [Respiration, Rhythm And Depth] : normal respiratory rhythm and effort [Abdomen Soft] : soft [Abdomen Tenderness] : non-tender [Abnormal Walk] : normal gait [Abdomen Mass (___ Cm)] : no abdominal mass palpated [Gait - Sufficient For Exercise Testing] : the gait was sufficient for exercise testing [Nail Clubbing] : no clubbing of the fingernails [Petechial Hemorrhages (___cm)] : no petechial hemorrhages [Cyanosis, Localized] : no localized cyanosis [Skin Lesions] : no skin lesions [No Venous Stasis] : no venous stasis [No Skin Ulcers] : no skin ulcer [No Xanthoma] : no  xanthoma was observed [Sensation] : the sensory exam was normal to light touch and pinprick [Deep Tendon Reflexes (DTR)] : deep tendon reflexes were 2+ and symmetric [Oriented To Time, Place, And Person] : oriented to person, place, and time [No Focal Deficits] : no focal deficits [Affect] : the affect was normal [Impaired Insight] : insight and judgment were intact [Skin Color & Pigmentation] : normal skin color and pigmentation [] : no rash [Skin Turgor] : normal skin turgor [FreeTextEntry1] : I:E 1:3,  mild expiratory wheeze

## 2019-12-30 NOTE — ADDENDUM
[FreeTextEntry1] : Documented by Gavin Brody acting as a scribe for Dr. Antonio Reyes on 12/30/2019 \par \par All medical record entries made by the Scribe were at my, Dr. Antonio Reyes's, direction and personally dictated by me on 12/30/2019 . I have reviewed the chart and agree that the record accurately reflects my personal performance of the history, physical exam, assessment and plan. I have also personally directed, reviewed, and agree with the discharge instructions.\par

## 2019-12-30 NOTE — PROCEDURE
[FreeTextEntry1] : PFT revealed moderate restrictive dysfunction, with a FEV1 of  1.89 L, which is 69% of predicted, with a normal flow volume loop. \par \par \par

## 2019-12-30 NOTE — HISTORY OF PRESENT ILLNESS
[FreeTextEntry1] : Mr. Chapman is a 69 year old male with a history of severe persistent corticosteroid dependent Asthma, SOB, allergy presenting to the office today for a follow up visit. His chief complaint is productive cough.\par \par  Patient is overall better. \par - Last 2 weeks he had a cough and a cold \par - He is still on Prednisone 5mg \par - He c/o of some chest pain/pressure \par - He states mucus is white in color when he coughs\par - Some days he has itchy ears \par - He c/o of dry mouth and tongue especially in the night time. Off and on \par - Denies rash\par - C/o of itchiness in the legs \par - Denies hoarseness and heartburn \par - Vision is okay, Just had an appt which was normal \par - Blood sugar levels are normal \par - Patient will be leaving on 1/23 to go to [facility] until 4/30 \par \par \par denies any headaches, nausea, vomiting, fever, chills, sweats,  diarrhea, constipation, dysphagia, dizziness, sour taste in the mouth, leg swelling, leg pain, itchy eyes, heartburn, reflux, myalgias or arthralgias\par

## 2019-12-30 NOTE — ASSESSMENT
[FreeTextEntry1] : Mr. Chapman is a 69 year old male  with a prior history of   diabetes mellitus, asthma for 10 years/ eosinophilic asthma, though most prominent in the last year following PNA and bronchitis, abnormal CT scan of the chest who now comes in for a pulmonary re-evaluation. His asthma is currently active - but overall better ([facility] hiatus 1/23-4/30). \par \par Problem 1: Severe Persistent Asthma (eosinophilic asthma) -acive\par -Nucala #3 given today in office - ? Received in [place] \par - Continue Duoneb via the nebulizer TID-QID \par - continue Utibron 1 inhalation BID\par -continue Arnuity 200 \par - Continue Singulair 10 mg QHS \par - Asthma is believed to be caused by inherited (genetic) and environmental factor, but its exact cause is unknown. Asthma may be triggered by allergens, lung infections, or irritants in the air. Asthma triggers are different for each person. \par -Inhaler technique reviewed as well as oral hygiene techniques reviewed with patient. Avoidance of cold air, extremes of temperature, rescue inhaler should be used before exercise. Order of medication reviewed with patient. Recommended use of a cool mist humidifier in the bedroom. \par -The safety and efficacy of Nucala was established in three double-blind, randomized, placebo-controlled trials in patients with severe asthma. Compared to a placebo, patients with severe asthma receiving Nucala had fewer exacerbation requiring hospitalization and/or emergency department visits, and a longer time to first exacerbation. In addition, patients with severe asthma receiving Nucala or Fasenra experienced greater reductions in their daily maintenance oral corticosteroid dose, while maintaining asthma control compared with patients receiving placebo. Treatment with Nucala did not result in a significant improvement in lung function, as measured by the volume of air exhaled by patients in one second. The most common side effects include: headache, injection site reactions, back pain, weakness, and fatigue; hypersensitivity reactions can occur within hours or days including swelling of the face, mouth, and tongue, fainting, dizziness, hives, breathing problems, and rash; herpes zoster infections have occurred. The drug is a monoclonal antibody that inhibits interleukin -5 which helps regular eosinophils, a type of white blood cell that contributes to asthma. The over-production of eosinophils can cause inflammation in the lungs, increasing the frequency of asthma attacks. Patients must also take other medications, including high dose inhaled corticosteroids and at least one additional asthma drug \par \par Problem 1A: Corticosteroid- Dependent Asthma\par - Currently on prednisone 5 mg QD - begin to taper once on Nucala for 1 month\par - Considering Dupixent- initiating paper work (not a candidate) \par - Information sheet given to the patient to be reviewed, this medication is never to be used without consulting the prescribing physician. Proper dietary restraint is necessary specifically salt containing foods, if any reaction may occur should be reported. \par \par Problem 2: no tracheomalacia present\par - s/p Dynamic CT scan (-)\par  \par Problem 3: Allergy/ PND\par - Continue Flonase 1 sniff/nostril BID\par Environmental measures for allergies were encouraged including mattress and pillow covers, air purifier and environmental controls.\par \par \par Problem 4: GERD\par - Continue Omeorazole 40 mg qAM before breakfast\par -continue Pepcid 40 mg QHS \par - Things to avoid including overeating, spicy foods, tight clothing, eating within three hours of bed, this list is not all inclusive. \par - For treatment of reflux, possible options discussed including diet control, H2 blockers, PPIs, as well as coating motility agents discussed as treatment options. Timing of meals and proximity of last meal to sleep were discussed. If symptoms persist, a formal gastrointestinal evaluation is needed. \par \par Problem 5: R/o OSAS\par - Due to his EDS, snoring, elevated Mallampati class, he is being recommended for a home sleep study to r/o the diagnosis of OSAS.\par - Sleep apnea is associated with adverse clinical consequences which an affect most organ systems. Cardiovascular disease risk includes arrhythmias, atrial fibrillation, hypertension, coronary artery disease, and stroke. Metabolic disorders include diabetes type 2, non-alcoholic fatty liver disease. Mood disorder especially depression; and cognitive decline especially in the elderly. Associations with chronic reflux/Batista’s esophagus some but not all inclusive. \par -Reasons include arousal consistent with hypopnea; respiratory events most prominent in REM sleep or supine position; therefore sleep staging and body position are important for accurate diagnosis and estimation of AHI. \par \par Problem 6: Abnormal CT most c/w Inflammation -(resolved)\par - F/u chest CT scan September 2020 \par \par Problem 7: Health maintenance \par -s/p flu shot (2019)\par -recommended strep pneumonia vaccines: Prevnar-13 vaccine, followed by Pneumo vaccine 23 one year following\par -recommended early intervention for URIs\par -recommended regular osteoporosis evaluations\par -recommended early dermatological evaluations\par -recommended after the age of 50 to the age of 70, colonoscopy every 5 years\par \par \par f/u in 3 months with SPI and NiOx\par pt is encouraged to call or fax the office with any questions or concerns. \par Explained to the pt in full detail with demonstrations how to use the inhalers and inhaler hygiene. \par -Education provided to the PT regarding their visit and conditions.

## 2020-01-07 ENCOUNTER — APPOINTMENT (OUTPATIENT)
Dept: ENDOCRINOLOGY | Facility: CLINIC | Age: 70
End: 2020-01-07
Payer: MEDICAID

## 2020-01-07 ENCOUNTER — RX RENEWAL (OUTPATIENT)
Age: 70
End: 2020-01-07

## 2020-01-07 VITALS
WEIGHT: 160 LBS | BODY MASS INDEX: 25.71 KG/M2 | HEIGHT: 66 IN | OXYGEN SATURATION: 97 % | DIASTOLIC BLOOD PRESSURE: 74 MMHG | HEART RATE: 90 BPM | SYSTOLIC BLOOD PRESSURE: 124 MMHG

## 2020-01-07 VITALS — HEIGHT: 65.25 IN | BODY MASS INDEX: 26.42 KG/M2

## 2020-01-07 PROCEDURE — 83036 HEMOGLOBIN GLYCOSYLATED A1C: CPT | Mod: QW

## 2020-01-07 PROCEDURE — 99214 OFFICE O/P EST MOD 30 MIN: CPT

## 2020-01-07 PROCEDURE — 77085 DXA BONE DENSITY AXL VRT FX: CPT

## 2020-01-07 RX ORDER — LANCETS
EACH MISCELLANEOUS
Qty: 2 | Refills: 5 | Status: ACTIVE | COMMUNITY
Start: 2020-01-07 | End: 1900-01-01

## 2020-01-07 RX ORDER — BLOOD SUGAR DIAGNOSTIC
STRIP MISCELLANEOUS 4 TIMES DAILY
Qty: 2 | Refills: 4 | Status: ACTIVE | COMMUNITY
Start: 2020-01-07 | End: 1900-01-01

## 2020-01-08 ENCOUNTER — TRANSCRIPTION ENCOUNTER (OUTPATIENT)
Age: 70
End: 2020-01-08

## 2020-01-10 LAB
CREAT SPEC-SCNC: 57 MG/DL
HBA1C MFR BLD HPLC: 7.1
MICROALBUMIN 24H UR DL<=1MG/L-MCNC: <1.2 MG/DL
MICROALBUMIN/CREAT 24H UR-RTO: NORMAL MG/G

## 2020-01-14 ENCOUNTER — TRANSCRIPTION ENCOUNTER (OUTPATIENT)
Age: 70
End: 2020-01-14

## 2020-01-15 ENCOUNTER — RX RENEWAL (OUTPATIENT)
Age: 70
End: 2020-01-15

## 2020-01-21 ENCOUNTER — APPOINTMENT (OUTPATIENT)
Dept: PULMONOLOGY | Facility: CLINIC | Age: 70
End: 2020-01-21
Payer: MEDICAID

## 2020-01-21 VITALS
OXYGEN SATURATION: 87 % | SYSTOLIC BLOOD PRESSURE: 140 MMHG | DIASTOLIC BLOOD PRESSURE: 70 MMHG | HEART RATE: 97 BPM | RESPIRATION RATE: 16 BRPM | HEIGHT: 65 IN | WEIGHT: 146 LBS | BODY MASS INDEX: 24.32 KG/M2

## 2020-01-21 PROCEDURE — 96372 THER/PROPH/DIAG INJ SC/IM: CPT

## 2020-01-21 RX ORDER — MEPOLIZUMAB 100 MG/ML
100 INJECTION, POWDER, FOR SOLUTION SUBCUTANEOUS
Qty: 0 | Refills: 0 | Status: COMPLETED | OUTPATIENT
Start: 2020-01-20

## 2020-02-01 ENCOUNTER — RX RENEWAL (OUTPATIENT)
Age: 70
End: 2020-02-01

## 2020-05-04 ENCOUNTER — APPOINTMENT (OUTPATIENT)
Dept: PULMONOLOGY | Facility: CLINIC | Age: 70
End: 2020-05-04

## 2020-05-13 ENCOUNTER — APPOINTMENT (OUTPATIENT)
Dept: ENDOCRINOLOGY | Facility: CLINIC | Age: 70
End: 2020-05-13

## 2020-10-01 ENCOUNTER — TRANSCRIPTION ENCOUNTER (OUTPATIENT)
Age: 70
End: 2020-10-01

## 2020-10-07 ENCOUNTER — TRANSCRIPTION ENCOUNTER (OUTPATIENT)
Age: 70
End: 2020-10-07

## 2020-10-08 ENCOUNTER — TRANSCRIPTION ENCOUNTER (OUTPATIENT)
Age: 70
End: 2020-10-08

## 2020-10-16 ENCOUNTER — APPOINTMENT (OUTPATIENT)
Dept: PULMONOLOGY | Facility: CLINIC | Age: 70
End: 2020-10-16
Payer: MEDICAID

## 2020-10-16 VITALS
BODY MASS INDEX: 24.99 KG/M2 | OXYGEN SATURATION: 98 % | HEART RATE: 91 BPM | HEIGHT: 65 IN | DIASTOLIC BLOOD PRESSURE: 60 MMHG | SYSTOLIC BLOOD PRESSURE: 140 MMHG | TEMPERATURE: 97.2 F | WEIGHT: 150 LBS | RESPIRATION RATE: 16 BRPM

## 2020-10-16 PROCEDURE — 99214 OFFICE O/P EST MOD 30 MIN: CPT | Mod: 25

## 2020-10-16 PROCEDURE — 96372 THER/PROPH/DIAG INJ SC/IM: CPT

## 2020-10-16 RX ORDER — MEPOLIZUMAB 100 MG/ML
100 INJECTION, POWDER, FOR SOLUTION SUBCUTANEOUS
Qty: 0 | Refills: 0 | Status: COMPLETED | OUTPATIENT
Start: 2020-10-16

## 2020-10-16 RX ADMIN — MEPOLIZUMAB 1 MG: 100 INJECTION, POWDER, FOR SOLUTION SUBCUTANEOUS at 00:00

## 2020-10-16 NOTE — HISTORY OF PRESENT ILLNESS
[FreeTextEntry1] : Mr. Chapman is a 70 year old male with a history of severe persistent corticosteroid dependent Asthma, SOB, allergy presenting to the office today for a follow up visit. His chief complaint is \par \par -he notes generally feeling well\par -he notes intermittent diarrhea\par -he notes senses of smell and taste are good\par -he notes energy levels are high\par -he notes sleeping well\par -he notes sleeping 6 hours a night on average\par -he notes eating well\par -he denies visual issues, stable\par -he denies vocal hoarseness\par -he notes rare snore\par -he notes regular bowel movements \par -he notes cough issue 4/2020 now resolved, still on prednisone \par -he notes recent return from [place] 9/2020\par \par -denies any headaches, nausea, vomiting, fever, chills, sweats, chest pain, chest pressure, constipation, dysphagia, dizziness, leg swelling, leg pain, myalgias, arthralgias, itchy eyes, itchy ears, heartburn, reflux, or sour taste in the mouth.\par \par

## 2020-10-16 NOTE — ADDENDUM
[FreeTextEntry1] : Documented by [place] acting as a scribe for Dr. Antonio Reyes on 10/16/2020.\par \par All medical record entries made by the Scribe were at my, Dr. Antonio Reyes's, direction and personally dictated by me on 10/16/2020 . I have reviewed the chart and agree that the record accurately reflects my personal performance of the history, physical exam, assessment and plan. I have also personally directed, reviewed, and agree with the discharge instructions. \par

## 2020-10-16 NOTE — PHYSICAL EXAM
[General Appearance - Well Developed] : well developed [Normal Appearance] : normal appearance [Well Groomed] : well groomed [No Deformities] : no deformities [General Appearance - Well Nourished] : well nourished [General Appearance - In No Acute Distress] : no acute distress [Eyelids - No Xanthelasma] : the eyelids demonstrated no xanthelasmas [Normal Conjunctiva] : the conjunctiva exhibited no abnormalities [Normal Oropharynx] : normal oropharynx [III] : III [Neck Appearance] : the appearance of the neck was normal [Neck Cervical Mass (___cm)] : no neck mass was observed [Jugular Venous Distention Increased] : there was no jugular-venous distention [Heart Sounds] : normal S1 and S2 [Thyroid Diffuse Enlargement] : the thyroid was not enlarged [Thyroid Nodule] : there were no palpable thyroid nodules [Heart Rate And Rhythm] : heart rate and rhythm were normal [Murmurs] : no murmurs present [Respiration, Rhythm And Depth] : normal respiratory rhythm and effort [Exaggerated Use Of Accessory Muscles For Inspiration] : no accessory muscle use [Abdomen Tenderness] : non-tender [Abdomen Soft] : soft [Abnormal Walk] : normal gait [Abdomen Mass (___ Cm)] : no abdominal mass palpated [Gait - Sufficient For Exercise Testing] : the gait was sufficient for exercise testing [Cyanosis, Localized] : no localized cyanosis [Nail Clubbing] : no clubbing of the fingernails [Petechial Hemorrhages (___cm)] : no petechial hemorrhages [Skin Lesions] : no skin lesions [No Venous Stasis] : no venous stasis [No Skin Ulcers] : no skin ulcer [No Xanthoma] : no  xanthoma was observed [Sensation] : the sensory exam was normal to light touch and pinprick [Deep Tendon Reflexes (DTR)] : deep tendon reflexes were 2+ and symmetric [No Focal Deficits] : no focal deficits [Impaired Insight] : insight and judgment were intact [Oriented To Time, Place, And Person] : oriented to person, place, and time [Skin Color & Pigmentation] : normal skin color and pigmentation [Skin Turgor] : normal skin turgor [Affect] : the affect was normal [] : no rash [FreeTextEntry1] : ventral hernia

## 2020-10-16 NOTE — ASSESSMENT
[FreeTextEntry1] : Mr. Chapman is a 70 year old male  with a prior history of   diabetes mellitus, asthma for 10 years/ eosinophilic asthma, though most prominent in the last year following PNA and bronchitis, abnormal CT scan of the chest who now comes in for a pulmonary re-evaluation. His asthma is currently active - but overall better ([place] hiatus 1/23-4/30). stable overall\par \par Problem 1: Severe Persistent Asthma (eosinophilic asthma) (controlled)\par -continue Nucala- did not receive in [place]  (restart)\par - Continue Duoneb via the nebulizer TID-QID \par - continue Utibron 1 inhalation BID\par -continue Arnuity 200 \par - Continue Singulair 10 mg QHS \par - Asthma is believed to be caused by inherited (genetic) and environmental factor, but its exact cause is unknown. Asthma may be triggered by allergens, lung infections, or irritants in the air. Asthma triggers are different for each person. \par -Inhaler technique reviewed as well as oral hygiene techniques reviewed with patient. Avoidance of cold air, extremes of temperature, rescue inhaler should be used before exercise. Order of medication reviewed with patient. Recommended use of a cool mist humidifier in the bedroom. \par -The safety and efficacy of Nucala was established in three double-blind, randomized, placebo-controlled trials in patients with severe asthma. Compared to a placebo, patients with severe asthma receiving Nucala had fewer exacerbation requiring hospitalization and/or emergency department visits, and a longer time to first exacerbation. In addition, patients with severe asthma receiving Nucala or Fasenra experienced greater reductions in their daily maintenance oral corticosteroid dose, while maintaining asthma control compared with patients receiving placebo. Treatment with Nucala did not result in a significant improvement in lung function, as measured by the volume of air exhaled by patients in one second. The most common side effects include: headache, injection site reactions, back pain, weakness, and fatigue; hypersensitivity reactions can occur within hours or days including swelling of the face, mouth, and tongue, fainting, dizziness, hives, breathing problems, and rash; herpes zoster infections have occurred. The drug is a monoclonal antibody that inhibits interleukin -5 which helps regular eosinophils, a type of white blood cell that contributes to asthma. The over-production of eosinophils can cause inflammation in the lungs, increasing the frequency of asthma attacks. Patients must also take other medications, including high dose inhaled corticosteroids and at least one additional asthma drug \par \par Problem 1A: Corticosteroid- Dependent Asthma\par - Currently on prednisone 2.5 mg QOD\par - Considering Dupixent- initiating paper work (not a candidate) \par - Information sheet given to the patient to be reviewed, this medication is never to be used without consulting the prescribing physician. Proper dietary restraint is necessary specifically salt containing foods, if any reaction may occur should be reported. \par \par Problem 1B: Eosinophilic Asthma (5%, 400)\par -Candidate for Biologics (Fasenra, Nucala, Dupixent) \par -The safety and efficacy of Nucala was established in three double-blind, randomized, placebo-controlled trials in patients with severe asthma. Compared to a placebo, patients with severe asthma receiving Nucala had fewer exacerbation requiring hospitalization and/or emergency department visits, and a longer time to first exacerbation. In addition, patients with severe asthma receiving Nucala or Fasenra experienced greater reductions in their daily maintenance oral corticosteroid dose, while maintaining asthma control compared with patients receiving placebo. Treatment with Nucala did not result in a significant improvement in lung function, as measured by the volume of air exhaled by patients in one second. The most common side effects include: headache, injection site reactions, back pain, weakness, and fatigue; hypersensitivity reactions can occur within hours or days including swelling of the face, mouth, and tongue, fainting, dizziness, hives, breathing problems, and rash; herpes zoster infections have occurred. The drug is a monoclonal antibody that inhibits interleukin-5 which helps regular eosinophils, a type of white blood cell that contributes to asthma. The over-production of eosinophils can cause inflammation in the lungs, increasing the frequency of asthma attacks. Patients must also take other medications, including high dose inhaled corticosteroids and at least one additional asthma drug.\par \par Problem 2: no tracheomalacia present\par - s/p Dynamic CT scan (-)\par  \par Problem 3: Allergy/ PND\par - Continue Flonase 1 sniff/nostril BID\par Environmental measures for allergies were encouraged including mattress and pillow covers, air purifier and environmental controls.\par \par Problem 4: GERD\par - Continue Omeprazole 40 mg qAM before breakfast\par -continue Pepcid 40 mg QHS \par - Things to avoid including overeating, spicy foods, tight clothing, eating within three hours of bed, this list is not all inclusive. \par - For treatment of reflux, possible options discussed including diet control, H2 blockers, PPIs, as well as coating motility agents discussed as treatment options. Timing of meals and proximity of last meal to sleep were discussed. If symptoms persist, a formal gastrointestinal evaluation is needed. \par \par Problem 5: R/o OSAS\par - Due to his EDS, snoring, elevated Mallampati class, he is being recommended for a home sleep study to r/o the diagnosis of OSAS.\par - Sleep apnea is associated with adverse clinical consequences which an affect most organ systems. Cardiovascular disease risk includes arrhythmias, atrial fibrillation, hypertension, coronary artery disease, and stroke. Metabolic disorders include diabetes type 2, non-alcoholic fatty liver disease. Mood disorder especially depression; and cognitive decline especially in the elderly. Associations with chronic reflux/Batista’s esophagus some but not all inclusive. \par -Reasons include arousal consistent with hypopnea; respiratory events most prominent in REM sleep or supine position; therefore sleep staging and body position are important for accurate diagnosis and estimation of AHI. \par \par Problem 6: Abnormal CT most c/w Inflammation -(resolved)\par - F/u chest CT scan September 2020 (overdue) \par \par Problem 7: Health maintenance \par -s/p flu shot (2020)\par -recommended strep pneumonia vaccines: Prevnar-13 vaccine, followed by Pneumo vaccine 23 one year following\par -recommended early intervention for URIs\par -recommended regular osteoporosis evaluations\par -recommended early dermatological evaluations\par -recommended after the age of 50 to the age of 70, colonoscopy every 5 years\par \par \par f/u in 3 months with SPI and NiOx\par pt is encouraged to call or fax the office with any questions or concerns. \par Explained to the pt in full detail with demonstrations how to use the inhalers and inhaler hygiene. \par -Education provided to the PT regarding their visit and conditions.

## 2020-10-19 ENCOUNTER — TRANSCRIPTION ENCOUNTER (OUTPATIENT)
Age: 70
End: 2020-10-19

## 2020-11-10 ENCOUNTER — APPOINTMENT (OUTPATIENT)
Dept: ENDOCRINOLOGY | Facility: CLINIC | Age: 70
End: 2020-11-10
Payer: MEDICAID

## 2020-11-10 ENCOUNTER — TRANSCRIPTION ENCOUNTER (OUTPATIENT)
Age: 70
End: 2020-11-10

## 2020-11-10 VITALS
BODY MASS INDEX: 25.16 KG/M2 | SYSTOLIC BLOOD PRESSURE: 136 MMHG | WEIGHT: 151 LBS | OXYGEN SATURATION: 98 % | DIASTOLIC BLOOD PRESSURE: 64 MMHG | TEMPERATURE: 97.9 F | HEART RATE: 98 BPM | HEIGHT: 65 IN

## 2020-11-10 PROCEDURE — 99072 ADDL SUPL MATRL&STAF TM PHE: CPT

## 2020-11-10 PROCEDURE — 99214 OFFICE O/P EST MOD 30 MIN: CPT | Mod: 25

## 2020-11-10 RX ORDER — METFORMIN HYDROCHLORIDE 1000 MG/1
1000 TABLET, COATED ORAL
Qty: 180 | Refills: 1 | Status: ACTIVE | COMMUNITY
Start: 2018-12-06 | End: 1900-01-01

## 2020-11-10 NOTE — HISTORY OF PRESENT ILLNESS
[FreeTextEntry1] : This is a 70 y.o. man w/ DM2, HTN, asthma here for f/u  visit for DM2.\par \par He was hospitalized 11/2018 w/ CAP.\par \par He has DM2 > 15 years.\par \par Hba1c 7.1 1/2020. It was 8.3 10/2020.\par \par He is currently on Metformin 1000/500mg PO BID. He was on alogliptin but he stopped it last week 2/2 itching.\par \par He is on prednisone. He is currently on 2.5mg PO every other day.\par \par He checks FS' s BID:\par AM: 112-138\par 2-3H after meals: 133-310\par \par He eats 3 meals a day: \par Bkfst: Chipote w/ black tea\par Lunch: [place] w/ rice, vegetables\par Dinner: similar to lunch\par Lunch and dinner are his biggest meals. He snacks on a couple of biscuits a day. He'll have sweets on special occasions.  He'll eat a few fruit a day.\par \par He exercises for 30 min a day on the treadmill.  Weight has been stable since last visit.\par \par He has been feeling overall ok. Reports good energy level. No c/o CP or SOB.\par \par He last saw optho 12/2019. No retinopathy. Has occ. sx of neuropathy at night.\par \par DEXA done 1/2020 showed normal BMD in spine and hip, w/ decreased in forearm. T score -2.3. \par He takes CA and Vit D. Vit D 13.1 10/2020. He started 50,000u qwk last week.\par

## 2020-11-10 NOTE — ASSESSMENT
[FreeTextEntry1] : This is a 70 y.o. man w/ DM2, HTN, asthma here for f/u pt. visit for DM2.\par \par DM2: Currently on Metformin 1500mg and Aloglipitin. He recently stopped the alogliptin 2/2 itch. Hba1c 8.3 10/2020. Will increase Metformin to 1g PO BID. Will stop Alogliptin and restart Januvia 100mg PO daily.\par He is still on prednisone but down to 2.5mg QOD. \par Instructed to check FS's at least BID- qam and 1-2h after dinner. He was counseled on lifestyle modification through diet and exercise.\par UTD w/ optho appt 12/2019. Has sx of neuropathy. Instructed to f/u w/ podiatry.\par Normal urine microalb today 10/2020.\par \par HTN: Controlled on current regimen.\par \par HLD:  Cont. statin.\par \par Osteopenia: DEXA done 1/2020 showed normal BMD in spine and hip, w/ decreased in forearm. T score -2.3. \par He takes CA and Vit D. Vit D 13.1 10/2020. He started 50,000u qwk last week. Will check PTH at next visit once Vit d repleted. RepeaT dexa 1/2022.\par \par \par RTC 3-4 mths\par \par \par

## 2020-11-10 NOTE — PHYSICAL EXAM
[Alert] : alert [Well Nourished] : well nourished [Healthy Appearance] : healthy appearance [No Acute Distress] : no acute distress [Normal Sclera/Conjunctiva] : normal sclera/conjunctiva [Normal Hearing] : hearing was normal [No Neck Mass] : no neck mass was observed [No LAD] : no lymphadenopathy [No Respiratory Distress] : no respiratory distress [No Accessory Muscle Use] : no accessory muscle use [Clear to Auscultation] : lungs were clear to auscultation bilaterally [Normal S1, S2] : normal S1 and S2 [Normal Rate] : heart rate was normal [Regular Rhythm] : with a regular rhythm [No Edema] : no peripheral edema [Normal Bowel Sounds] : normal bowel sounds [Not Tender] : non-tender [Soft] : abdomen soft [No CVA Tenderness] : no ~M costovertebral angle tenderness [No Stigmata of Cushings Syndrome] : no stigmata of Cushings Syndrome [Normal Gait] : normal gait [No Involuntary Movements] : no involuntary movements were seen [Right Foot Was Examined] : right foot ~C was examined [Left Foot Was Examined] : left foot ~C was examined [Normal] : normal [Full ROM] : with full range of motion [No Tremors] : no tremors [Oriented x3] : oriented to person, place, and time [Normal Affect] : the affect was normal [Diminished Throughout Both Feet] : diminished tactile sensation with monofilament testing throughout both feet

## 2020-11-12 ENCOUNTER — OUTPATIENT (OUTPATIENT)
Dept: OUTPATIENT SERVICES | Facility: HOSPITAL | Age: 70
LOS: 1 days | End: 2020-11-12
Payer: COMMERCIAL

## 2020-11-12 ENCOUNTER — APPOINTMENT (OUTPATIENT)
Dept: CT IMAGING | Facility: CLINIC | Age: 70
End: 2020-11-12
Payer: MEDICAID

## 2020-11-12 DIAGNOSIS — Z00.8 ENCOUNTER FOR OTHER GENERAL EXAMINATION: ICD-10-CM

## 2020-11-12 PROCEDURE — 71250 CT THORAX DX C-: CPT

## 2020-11-12 PROCEDURE — 71250 CT THORAX DX C-: CPT | Mod: 26

## 2020-11-13 ENCOUNTER — TRANSCRIPTION ENCOUNTER (OUTPATIENT)
Age: 70
End: 2020-11-13

## 2020-11-13 ENCOUNTER — APPOINTMENT (OUTPATIENT)
Dept: PULMONOLOGY | Facility: CLINIC | Age: 70
End: 2020-11-13
Payer: MEDICAID

## 2020-11-13 VITALS
HEIGHT: 65 IN | OXYGEN SATURATION: 98 % | SYSTOLIC BLOOD PRESSURE: 120 MMHG | DIASTOLIC BLOOD PRESSURE: 60 MMHG | RESPIRATION RATE: 16 BRPM | WEIGHT: 151 LBS | TEMPERATURE: 97.3 F | BODY MASS INDEX: 25.16 KG/M2 | HEART RATE: 85 BPM

## 2020-11-13 PROCEDURE — 96372 THER/PROPH/DIAG INJ SC/IM: CPT

## 2020-11-13 PROCEDURE — 99072 ADDL SUPL MATRL&STAF TM PHE: CPT

## 2020-11-13 RX ORDER — MEPOLIZUMAB 100 MG/ML
100 INJECTION, POWDER, FOR SOLUTION SUBCUTANEOUS
Qty: 0 | Refills: 0 | Status: COMPLETED | OUTPATIENT
Start: 2020-11-13

## 2020-11-16 ENCOUNTER — NON-APPOINTMENT (OUTPATIENT)
Age: 70
End: 2020-11-16

## 2020-12-11 ENCOUNTER — APPOINTMENT (OUTPATIENT)
Dept: PULMONOLOGY | Facility: CLINIC | Age: 70
End: 2020-12-11

## 2020-12-16 ENCOUNTER — APPOINTMENT (OUTPATIENT)
Dept: PULMONOLOGY | Facility: CLINIC | Age: 70
End: 2020-12-16

## 2020-12-24 ENCOUNTER — APPOINTMENT (OUTPATIENT)
Dept: PULMONOLOGY | Facility: CLINIC | Age: 70
End: 2020-12-24
Payer: MEDICAID

## 2020-12-24 VITALS
TEMPERATURE: 97.6 F | BODY MASS INDEX: 24.99 KG/M2 | DIASTOLIC BLOOD PRESSURE: 64 MMHG | HEIGHT: 65 IN | RESPIRATION RATE: 16 BRPM | OXYGEN SATURATION: 97 % | WEIGHT: 150 LBS | SYSTOLIC BLOOD PRESSURE: 138 MMHG | HEART RATE: 99 BPM

## 2020-12-24 PROCEDURE — 96372 THER/PROPH/DIAG INJ SC/IM: CPT

## 2020-12-24 PROCEDURE — 99072 ADDL SUPL MATRL&STAF TM PHE: CPT

## 2020-12-24 RX ORDER — MEPOLIZUMAB 100 MG/ML
100 INJECTION, POWDER, FOR SOLUTION SUBCUTANEOUS
Qty: 0 | Refills: 0 | Status: COMPLETED | OUTPATIENT
Start: 2020-12-24

## 2021-01-12 NOTE — PROGRESS NOTE ADULT - PROBLEM SELECTOR PLAN 5
[TextBox_4] : History by mother. \par History of prenatal hydronephrosis.  Kidney/bladder ultrasound (1/27/20) demonstrated normal sonographic appearance of the kidneys and urinary bladder.  Repeat ultrasound was completed at [facility] (2/24/20) which demonstrated mild left-sided hydronephrosis and mild fullness of the right renal collecting system. Upon review of the images he has bilateral grade 1 hydronephrosis. Not on antibiotic suppression. No associated signs or symptoms. No aggravating or relieving factors. Insidious onset. No history of UTI, genital infections or other urologic issues.  [facility] (6/15/20) did not demonstrate vesicoureteral reflux.\par \par At his last visit, renal/bladder ultrasounds demonstrated right grade 1 and left grade 3 hydronephrosis.  He returns today for reexamination and repeat in-office kidney/bladder ultrasounds.  No reported interval urologic issues since his last visit. No antibiotics.
zocor
zocor

## 2021-01-22 ENCOUNTER — APPOINTMENT (OUTPATIENT)
Dept: PULMONOLOGY | Facility: CLINIC | Age: 71
End: 2021-01-22
Payer: MEDICAID

## 2021-01-22 ENCOUNTER — TRANSCRIPTION ENCOUNTER (OUTPATIENT)
Age: 71
End: 2021-01-22

## 2021-01-22 VITALS
BODY MASS INDEX: 24.11 KG/M2 | SYSTOLIC BLOOD PRESSURE: 126 MMHG | DIASTOLIC BLOOD PRESSURE: 66 MMHG | WEIGHT: 150 LBS | OXYGEN SATURATION: 97 % | RESPIRATION RATE: 16 BRPM | HEART RATE: 102 BPM | TEMPERATURE: 97.3 F | HEIGHT: 66 IN

## 2021-01-22 PROCEDURE — 99072 ADDL SUPL MATRL&STAF TM PHE: CPT

## 2021-01-22 PROCEDURE — 99214 OFFICE O/P EST MOD 30 MIN: CPT | Mod: 25

## 2021-01-22 PROCEDURE — 96372 THER/PROPH/DIAG INJ SC/IM: CPT

## 2021-01-22 RX ORDER — PANTOPRAZOLE 40 MG/1
40 TABLET, DELAYED RELEASE ORAL
Qty: 90 | Refills: 1 | Status: ACTIVE | COMMUNITY
Start: 2019-05-28 | End: 1900-01-01

## 2021-01-22 RX ORDER — SITAGLIPTIN 100 MG/1
100 TABLET, FILM COATED ORAL
Qty: 90 | Refills: 1 | Status: ACTIVE | COMMUNITY
Start: 2019-11-01 | End: 1900-01-01

## 2021-01-22 RX ORDER — MEPOLIZUMAB 100 MG/ML
100 INJECTION, POWDER, FOR SOLUTION SUBCUTANEOUS
Qty: 0 | Refills: 0 | Status: COMPLETED | OUTPATIENT
Start: 2021-01-22

## 2021-01-22 RX ADMIN — MEPOLIZUMAB 1 MG: 100 INJECTION, POWDER, FOR SOLUTION SUBCUTANEOUS at 00:00

## 2021-01-22 NOTE — HISTORY OF PRESENT ILLNESS
[FreeTextEntry1] : Mr. Chapman is a 70 year old male with a history of severe persistent corticosteroid dependent Asthma, SOB, allergy presenting to the office today for a follow up visit. His chief complaint is \par \par -he notes generally feeling well\par -he notes intermittent constipation\par -he notes intermittent dry eyes, worse in the morning\par -he notes mild dizziness episodes\par -he notes exercising regularly on treadmill \par -he notes regular bowel movements \par -he notes intermittent urticaria localized to foot\par -he notes blood sugar stable and controlled\par -he notes mild intermittent cough, stable\par -he notes mild mucus production but able to expectorate and clear\par \par -denies any chest pain, chest pressure, diarrhea, constipation, dysphagia, sour taste in the mouth, leg swelling, leg pain, myalgias, arthralgias, itchy eyes, itchy ears, heartburn, or reflux.\par \par

## 2021-01-22 NOTE — ASSESSMENT
[FreeTextEntry1] : Mr. Chapman is a 70 year old male  with a prior history of   diabetes mellitus, asthma for 10 years/ eosinophilic asthma, though most prominent in the last year following PNA and bronchitis, abnormal CT scan of the chest who now comes in for a pulmonary follow up. His asthma is currently active - but overall better ([place] hiatus 1/23-4/30). stable overall\par \par Problem 1: Severe Persistent Asthma (eosinophilic asthma) (controlled)\par -continue Nucala- did not receive in [place]  (in place since 10/2020) \par -add Spiriva at 2 inhalations QAM\par - Continue Duoneb via the nebulizer TID-QID \par - continue Utibron 1 inhalation BID\par -continue Arnuity 200 \par - Continue Singulair 10 mg QHS \par - Asthma is believed to be caused by inherited (genetic) and environmental factor, but its exact cause is unknown. Asthma may be triggered by allergens, lung infections, or irritants in the air. Asthma triggers are different for each person. \par -Inhaler technique reviewed as well as oral hygiene techniques reviewed with patient. Avoidance of cold air, extremes of temperature, rescue inhaler should be used before exercise. Order of medication reviewed with patient. Recommended use of a cool mist humidifier in the bedroom. \par -The safety and efficacy of Nucala was established in three double-blind, randomized, placebo-controlled trials in patients with severe asthma. Compared to a placebo, patients with severe asthma receiving Nucala had fewer exacerbation requiring hospitalization and/or emergency department visits, and a longer time to first exacerbation. In addition, patients with severe asthma receiving Nucala or Fasenra experienced greater reductions in their daily maintenance oral corticosteroid dose, while maintaining asthma control compared with patients receiving placebo. Treatment with Nucala did not result in a significant improvement in lung function, as measured by the volume of air exhaled by patients in one second. The most common side effects include: headache, injection site reactions, back pain, weakness, and fatigue; hypersensitivity reactions can occur within hours or days including swelling of the face, mouth, and tongue, fainting, dizziness, hives, breathing problems, and rash; herpes zoster infections have occurred. The drug is a monoclonal antibody that inhibits interleukin -5 which helps regular eosinophils, a type of white blood cell that contributes to asthma. The over-production of eosinophils can cause inflammation in the lungs, increasing the frequency of asthma attacks. Patients must also take other medications, including high dose inhaled corticosteroids and at least one additional asthma drug \par \par Problem 1A: Corticosteroid- Dependent Asthma\par - Currently on prednisone 2.5 mg QOD\par - Considering Dupixent- initiating paper work (not a candidate) \par - Information sheet given to the patient to be reviewed, this medication is never to be used without consulting the prescribing physician. Proper dietary restraint is necessary specifically salt containing foods, if any reaction may occur should be reported. \par \par Problem 1B: Eosinophilic Asthma (5%, 400)\par -Candidate for Biologics (Fasenra, Nucala, Dupixent) \par -on Nucala (month 3) \par -The safety and efficacy of Nucala was established in three double-blind, randomized, placebo-controlled trials in patients with severe asthma. Compared to a placebo, patients with severe asthma receiving Nucala had fewer exacerbation requiring hospitalization and/or emergency department visits, and a longer time to first exacerbation. In addition, patients with severe asthma receiving Nucala or Fasenra experienced greater reductions in their daily maintenance oral corticosteroid dose, while maintaining asthma control compared with patients receiving placebo. Treatment with Nucala did not result in a significant improvement in lung function, as measured by the volume of air exhaled by patients in one second. The most common side effects include: headache, injection site reactions, back pain, weakness, and fatigue; hypersensitivity reactions can occur within hours or days including swelling of the face, mouth, and tongue, fainting, dizziness, hives, breathing problems, and rash; herpes zoster infections have occurred. The drug is a monoclonal antibody that inhibits interleukin-5 which helps regular eosinophils, a type of white blood cell that contributes to asthma. The over-production of eosinophils can cause inflammation in the lungs, increasing the frequency of asthma attacks. Patients must also take other medications, including high dose inhaled corticosteroids and at least one additional asthma drug.\par \par Problem 2: no tracheomalacia present\par - s/p Dynamic CT scan (-)\par  \par Problem 3: Allergy/ PND\par - Continue Flonase 1 sniff/nostril BID\par Environmental measures for allergies were encouraged including mattress and pillow covers, air purifier and environmental controls.\par \par Problem 4: GERD\par - Continue Omeprazole 40 mg qAM before breakfast\par -continue Pepcid 40 mg QHS \par - Things to avoid including overeating, spicy foods, tight clothing, eating within three hours of bed, this list is not all inclusive. \par - For treatment of reflux, possible options discussed including diet control, H2 blockers, PPIs, as well as coating motility agents discussed as treatment options. Timing of meals and proximity of last meal to sleep were discussed. If symptoms persist, a formal gastrointestinal evaluation is needed. \par \par Problem 5: R/o OSAS\par - Due to his EDS, snoring, elevated Mallampati class, he is being recommended for a home sleep study to r/o the diagnosis of OSAS.\par - Sleep apnea is associated with adverse clinical consequences which an affect most organ systems. Cardiovascular disease risk includes arrhythmias, atrial fibrillation, hypertension, coronary artery disease, and stroke. Metabolic disorders include diabetes type 2, non-alcoholic fatty liver disease. Mood disorder especially depression; and cognitive decline especially in the elderly. Associations with chronic reflux/Batista’s esophagus some but not all inclusive. \par -Reasons include arousal consistent with hypopnea; respiratory events most prominent in REM sleep or supine position; therefore sleep staging and body position are important for accurate diagnosis and estimation of AHI. \par \par Problem 6: Abnormal CT most c/w Inflammation -(resolved)\par - s/p chest CT scan (last 11/2020)- clear; no follow up needed \par -images reviewed and discussed with patient in detail \par \par Problem 7: Health maintenance \par -s/p flu shot (2020)\par -recommended strep pneumonia vaccines: Prevnar-13 vaccine, followed by Pneumo vaccine 23 one year following\par -recommended early intervention for URIs\par -recommended regular osteoporosis evaluations\par -recommended early dermatological evaluations\par -recommended after the age of 50 to the age of 70, colonoscopy every 5 years\par \par \par f/u in 3 months with SPI and NiOx\par pt is encouraged to call or fax the office with any questions or concerns. \par Explained to the pt in full detail with demonstrations how to use the inhalers and inhaler hygiene. \par -Education provided to the PT regarding their visit and conditions.

## 2021-01-22 NOTE — ADDENDUM
[FreeTextEntry1] : Documented by [place] acting as a scribe for Dr. Antonio Reyes on 01/22/2021.\par \par All medical record entries made by the Scribe were at my, Dr. Antonio Reyes's, direction and personally dictated by me on 01/22/2021 . I have reviewed the chart and agree that the record accurately reflects my personal performance of the history, physical exam, assessment and plan. I have also personally directed, reviewed, and agree with the discharge instructions. \par

## 2021-01-22 NOTE — PHYSICAL EXAM
[General Appearance - Well Developed] : well developed [Normal Appearance] : normal appearance [Well Groomed] : well groomed [General Appearance - Well Nourished] : well nourished [No Deformities] : no deformities [General Appearance - In No Acute Distress] : no acute distress [Normal Conjunctiva] : the conjunctiva exhibited no abnormalities [Eyelids - No Xanthelasma] : the eyelids demonstrated no xanthelasmas [Normal Oropharynx] : normal oropharynx [III] : III [Neck Appearance] : the appearance of the neck was normal [Neck Cervical Mass (___cm)] : no neck mass was observed [Jugular Venous Distention Increased] : there was no jugular-venous distention [Thyroid Diffuse Enlargement] : the thyroid was not enlarged [Thyroid Nodule] : there were no palpable thyroid nodules [Heart Rate And Rhythm] : heart rate and rhythm were normal [Heart Sounds] : normal S1 and S2 [Murmurs] : no murmurs present [Respiration, Rhythm And Depth] : normal respiratory rhythm and effort [Exaggerated Use Of Accessory Muscles For Inspiration] : no accessory muscle use [Abdomen Soft] : soft [Abdomen Tenderness] : non-tender [Abdomen Mass (___ Cm)] : no abdominal mass palpated [Abnormal Walk] : normal gait [Gait - Sufficient For Exercise Testing] : the gait was sufficient for exercise testing [Nail Clubbing] : no clubbing of the fingernails [Cyanosis, Localized] : no localized cyanosis [Petechial Hemorrhages (___cm)] : no petechial hemorrhages [No Venous Stasis] : no venous stasis [Skin Lesions] : no skin lesions [No Skin Ulcers] : no skin ulcer [No Xanthoma] : no  xanthoma was observed [Deep Tendon Reflexes (DTR)] : deep tendon reflexes were 2+ and symmetric [Sensation] : the sensory exam was normal to light touch and pinprick [No Focal Deficits] : no focal deficits [Oriented To Time, Place, And Person] : oriented to person, place, and time [Impaired Insight] : insight and judgment were intact [Affect] : the affect was normal [Skin Color & Pigmentation] : normal skin color and pigmentation [Skin Turgor] : normal skin turgor [] : no rash [FreeTextEntry1] : ventral hernia

## 2021-01-26 ENCOUNTER — APPOINTMENT (OUTPATIENT)
Dept: ENDOCRINOLOGY | Facility: CLINIC | Age: 71
End: 2021-01-26
Payer: MEDICAID

## 2021-01-26 ENCOUNTER — RESULT CHARGE (OUTPATIENT)
Age: 71
End: 2021-01-26

## 2021-01-26 VITALS
WEIGHT: 150 LBS | TEMPERATURE: 97.8 F | HEART RATE: 104 BPM | BODY MASS INDEX: 24.11 KG/M2 | SYSTOLIC BLOOD PRESSURE: 130 MMHG | DIASTOLIC BLOOD PRESSURE: 64 MMHG | HEIGHT: 66 IN | OXYGEN SATURATION: 98 %

## 2021-01-26 DIAGNOSIS — E11.65 TYPE 2 DIABETES MELLITUS WITH HYPERGLYCEMIA: ICD-10-CM

## 2021-01-26 LAB
GLUCOSE BLDC GLUCOMTR-MCNC: 145
HBA1C MFR BLD HPLC: 7.6

## 2021-01-26 PROCEDURE — 82962 GLUCOSE BLOOD TEST: CPT

## 2021-01-26 PROCEDURE — 83036 HEMOGLOBIN GLYCOSYLATED A1C: CPT | Mod: QW

## 2021-01-26 PROCEDURE — 99214 OFFICE O/P EST MOD 30 MIN: CPT

## 2021-01-26 PROCEDURE — 99072 ADDL SUPL MATRL&STAF TM PHE: CPT

## 2021-01-27 PROBLEM — E11.65 TYPE 2 DIABETES MELLITUS, UNCONTROLLED: Status: ACTIVE | Noted: 2019-02-16

## 2021-02-02 ENCOUNTER — TRANSCRIPTION ENCOUNTER (OUTPATIENT)
Age: 71
End: 2021-02-02

## 2021-02-19 ENCOUNTER — APPOINTMENT (OUTPATIENT)
Dept: PULMONOLOGY | Facility: CLINIC | Age: 71
End: 2021-02-19
Payer: MEDICAID

## 2021-02-19 VITALS
TEMPERATURE: 97.9 F | OXYGEN SATURATION: 97 % | HEIGHT: 66 IN | SYSTOLIC BLOOD PRESSURE: 130 MMHG | BODY MASS INDEX: 24.62 KG/M2 | HEART RATE: 104 BPM | RESPIRATION RATE: 16 BRPM | DIASTOLIC BLOOD PRESSURE: 79 MMHG | WEIGHT: 153.2 LBS

## 2021-02-19 PROCEDURE — 99072 ADDL SUPL MATRL&STAF TM PHE: CPT

## 2021-02-19 PROCEDURE — 96372 THER/PROPH/DIAG INJ SC/IM: CPT

## 2021-02-19 RX ORDER — MEPOLIZUMAB 100 MG/ML
100 INJECTION, POWDER, FOR SOLUTION SUBCUTANEOUS
Qty: 0 | Refills: 0 | Status: COMPLETED | OUTPATIENT
Start: 2021-02-19

## 2021-03-19 ENCOUNTER — APPOINTMENT (OUTPATIENT)
Dept: PULMONOLOGY | Facility: CLINIC | Age: 71
End: 2021-03-19
Payer: MEDICAID

## 2021-03-19 VITALS
TEMPERATURE: 97.4 F | RESPIRATION RATE: 17 BRPM | HEIGHT: 66 IN | OXYGEN SATURATION: 97 % | SYSTOLIC BLOOD PRESSURE: 130 MMHG | HEART RATE: 92 BPM | WEIGHT: 151 LBS | DIASTOLIC BLOOD PRESSURE: 70 MMHG | BODY MASS INDEX: 24.27 KG/M2

## 2021-03-19 PROCEDURE — 96372 THER/PROPH/DIAG INJ SC/IM: CPT

## 2021-03-19 PROCEDURE — 99072 ADDL SUPL MATRL&STAF TM PHE: CPT

## 2021-03-19 RX ORDER — MEPOLIZUMAB 100 MG/ML
100 INJECTION, POWDER, FOR SOLUTION SUBCUTANEOUS
Qty: 0 | Refills: 0 | Status: COMPLETED | OUTPATIENT
Start: 2021-03-19

## 2021-04-08 DIAGNOSIS — Z01.812 ENCOUNTER FOR PREPROCEDURAL LABORATORY EXAMINATION: ICD-10-CM

## 2021-04-22 ENCOUNTER — APPOINTMENT (OUTPATIENT)
Dept: PULMONOLOGY | Facility: CLINIC | Age: 71
End: 2021-04-22
Payer: MEDICAID

## 2021-04-22 VITALS
SYSTOLIC BLOOD PRESSURE: 130 MMHG | DIASTOLIC BLOOD PRESSURE: 65 MMHG | BODY MASS INDEX: 24.27 KG/M2 | TEMPERATURE: 96.4 F | WEIGHT: 151 LBS | HEART RATE: 101 BPM | OXYGEN SATURATION: 98 % | RESPIRATION RATE: 16 BRPM | HEIGHT: 66 IN

## 2021-04-22 PROCEDURE — 94618 PULMONARY STRESS TESTING: CPT

## 2021-04-22 PROCEDURE — 99072 ADDL SUPL MATRL&STAF TM PHE: CPT

## 2021-04-22 PROCEDURE — 99214 OFFICE O/P EST MOD 30 MIN: CPT | Mod: 25

## 2021-04-22 PROCEDURE — 95012 NITRIC OXIDE EXP GAS DETER: CPT

## 2021-04-22 PROCEDURE — 94727 GAS DIL/WSHOT DETER LNG VOL: CPT

## 2021-04-22 PROCEDURE — 94729 DIFFUSING CAPACITY: CPT

## 2021-04-22 PROCEDURE — 96372 THER/PROPH/DIAG INJ SC/IM: CPT

## 2021-04-22 PROCEDURE — ZZZZZ: CPT

## 2021-04-22 PROCEDURE — 94010 BREATHING CAPACITY TEST: CPT

## 2021-04-22 RX ORDER — MEPOLIZUMAB 100 MG/ML
100 INJECTION, POWDER, FOR SOLUTION SUBCUTANEOUS
Qty: 0 | Refills: 0 | Status: COMPLETED | OUTPATIENT
Start: 2021-04-22

## 2021-04-22 RX ADMIN — MEPOLIZUMAB 1 MG: 100 INJECTION, POWDER, FOR SOLUTION SUBCUTANEOUS at 00:00

## 2021-04-22 NOTE — PHYSICAL EXAM

## 2021-04-22 NOTE — HISTORY OF PRESENT ILLNESS
[FreeTextEntry1] : Mr. Chapman is a 71 year old male with a history of severe persistent corticosteroid dependent Asthma, SOB, allergy presenting to the office today for a follow up visit. His chief complaint is \par -he notes feeling good in general\par -he notes slight chest pressure\par -he notes intermittent reflux after meals\par -he notes occasional dry mouth in the morning\par -he notes sleeping 6 hours minimum per night\par -he notes memory and concentration is normal\par -he notes eating well\par -he notes his energy level is 6-7/10\par -he notes nocturia at approximately 3:30 am\par -he notes asthma is quiet\par -s/p COVID-19 vaccine x2 Moderna - sore arm\par -he denies problems with his medication\par -he notes his blood pressure is under control\par \par \par patient denies any headaches, nausea, vomiting, fever, chills, sweats, chest pain, chest pressure, palpitations, coughing, wheezing, fatigue, diarrhea, constipation, dysphagia, myalgias, dizziness, leg swelling, leg pain, itchy eyes, itchy ears, heartburn, or sour taste in the mouth

## 2021-04-22 NOTE — ASSESSMENT
[FreeTextEntry1] : Mr. Chapman is a 71 year old male  with a prior history of   diabetes mellitus, asthma for 10 years/ eosinophilic asthma, though most prominent in the last year following PNA and bronchitis, abnormal CT scan of the chest who now comes in for a pulmonary follow up. His asthma is currently active - but overall better ([place] hiatus 1/23-4/30). stable overall\par \par Problem 1: Severe Persistent Asthma (eosinophilic asthma) (controlled)\par -continue Nucala- did not receive in [place]  (in place since 10/2020) \par - Continue Duoneb via the nebulizer TID-QID \par - change Utibron 1 inhalation BID to Stiolto 2 Puffs BID \par -continue Arnuity 200 \par - Continue Singulair 10 mg QHS \par - Asthma is believed to be caused by inherited (genetic) and environmental factor, but its exact cause is unknown. Asthma may be triggered by allergens, lung infections, or irritants in the air. Asthma triggers are different for each person. \par -Inhaler technique reviewed as well as oral hygiene techniques reviewed with patient. Avoidance of cold air, extremes of temperature, rescue inhaler should be used before exercise. Order of medication reviewed with patient. Recommended use of a cool mist humidifier in the bedroom. \par -The safety and efficacy of Nucala was established in three double-blind, randomized, placebo-controlled trials in patients with severe asthma. Compared to a placebo, patients with severe asthma receiving Nucala had fewer exacerbation requiring hospitalization and/or emergency department visits, and a longer time to first exacerbation. In addition, patients with severe asthma receiving Nucala or Fasenra experienced greater reductions in their daily maintenance oral corticosteroid dose, while maintaining asthma control compared with patients receiving placebo. Treatment with Nucala did not result in a significant improvement in lung function, as measured by the volume of air exhaled by patients in one second. The most common side effects include: headache, injection site reactions, back pain, weakness, and fatigue; hypersensitivity reactions can occur within hours or days including swelling of the face, mouth, and tongue, fainting, dizziness, hives, breathing problems, and rash; herpes zoster infections have occurred. The drug is a monoclonal antibody that inhibits interleukin -5 which helps regular eosinophils, a type of white blood cell that contributes to asthma. The over-production of eosinophils can cause inflammation in the lungs, increasing the frequency of asthma attacks. Patients must also take other medications, including high dose inhaled corticosteroids and at least one additional asthma drug \par \par Problem 1A: Corticosteroid- Dependent Asthma\par - Currently on prednisone 2.5 mg QOD\par - Considering Dupixent- initiating paper work (not a candidate) \par - Information sheet given to the patient to be reviewed, this medication is never to be used without consulting the prescribing physician. Proper dietary restraint is necessary specifically salt containing foods, if any reaction may occur should be reported. \par \par Problem 1B: Eosinophilic Asthma (5%, 400)\par -Candidate for Biologics (Fasenra, Nucala, Dupixent) \par -on Nucala (monthly)\par -The safety and efficacy of Nucala was established in three double-blind, randomized, placebo-controlled trials in patients with severe asthma. Compared to a placebo, patients with severe asthma receiving Nucala had fewer exacerbation requiring hospitalization and/or emergency department visits, and a longer time to first exacerbation. In addition, patients with severe asthma receiving Nucala or Fasenra experienced greater reductions in their daily maintenance oral corticosteroid dose, while maintaining asthma control compared with patients receiving placebo. Treatment with Nucala did not result in a significant improvement in lung function, as measured by the volume of air exhaled by patients in one second. The most common side effects include: headache, injection site reactions, back pain, weakness, and fatigue; hypersensitivity reactions can occur within hours or days including swelling of the face, mouth, and tongue, fainting, dizziness, hives, breathing problems, and rash; herpes zoster infections have occurred. The drug is a monoclonal antibody that inhibits interleukin-5 which helps regular eosinophils, a type of white blood cell that contributes to asthma. The over-production of eosinophils can cause inflammation in the lungs, increasing the frequency of asthma attacks. Patients must also take other medications, including high dose inhaled corticosteroids and at least one additional asthma drug.\par \par Problem 2: no tracheomalacia present\par - s/p Dynamic CT scan (-)\par  \par Problem 3: Allergy/ PND\par - Continue Flonase 1 sniff/nostril BID\par Environmental measures for allergies were encouraged including mattress and pillow covers, air purifier and environmental controls.\par \par Problem 4: GERD\par - Continue Omeprazole 40 mg qAM before breakfast\par -continue Pepcid 40 mg QHS \par - Things to avoid including overeating, spicy foods, tight clothing, eating within three hours of bed, this list is not all inclusive. \par - For treatment of reflux, possible options discussed including diet control, H2 blockers, PPIs, as well as coating motility agents discussed as treatment options. Timing of meals and proximity of last meal to sleep were discussed. If symptoms persist, a formal gastrointestinal evaluation is needed. \par \par Problem 5: R/o OSAS\par - Due to his EDS, snoring, elevated Mallampati class, he is being recommended for a home sleep study to r/o the diagnosis of OSAS.\par - Sleep apnea is associated with adverse clinical consequences which an affect most organ systems. Cardiovascular disease risk includes arrhythmias, atrial fibrillation, hypertension, coronary artery disease, and stroke. Metabolic disorders include diabetes type 2, non-alcoholic fatty liver disease. Mood disorder especially depression; and cognitive decline especially in the elderly. Associations with chronic reflux/Batista’s esophagus some but not all inclusive. \par -Reasons include arousal consistent with hypopnea; respiratory events most prominent in REM sleep or supine position; therefore sleep staging and body position are important for accurate diagnosis and estimation of AHI. \par \par Problem 6: Abnormal CT most c/w Inflammation -(resolved)\par - s/p chest CT scan (last 11/2020)- clear; no follow up needed \par -images reviewed and discussed with patient in detail \par \par Problem 7: Health maintenance \par - S/p Covid 19 vaccine (Moderna) x2\par -s/p flu shot (2020)\par -recommended strep pneumonia vaccines: Prevnar-13 vaccine, followed by Pneumo vaccine 23 one year following\par -recommended early intervention for URIs\par -recommended regular osteoporosis evaluations\par -recommended early dermatological evaluations\par -recommended after the age of 50 to the age of 70, colonoscopy every 5 years\par \par \par f/u in 3 months with SPI and NiOx\par pt is encouraged to call or fax the office with any questions or concerns. \par Explained to the pt in full detail with demonstrations how to use the inhalers and inhaler hygiene. \par -Education provided to the PT regarding their visit and conditions.

## 2021-04-22 NOTE — ADDENDUM
[FreeTextEntry1] : Documented by [place] acting as a scribe for Dr. Antonio Reyes on (04/22/2021).\par \par All medical record entries made by the Scribe were at my, Dr. Antonio Reyes's, direction and personally dictated by me on (04/22/2021). I have reviewed the chart and agree that the record accurately reflects my personal performance of the history, physical exam, assessment and plan. I have also personally directed, reviewed, and agree with the discharge instructions.\par

## 2021-04-22 NOTE — PROCEDURE
[FreeTextEntry1] : Full PFT revealed mild restrictive and mild obstructive dysfunction, with a FEV1 of 1.81L, which is 60% of predicted, mild reduced lung volumes, and a diffusion of 20.2, which is 113% of predicted, with a normal flow volume loop \par \par Feno was 114; a normal value being less than 25. Fractional exhaled nitric oxide (FENO) is regarded as a simple, noninvasive method for assessing eosinophilic airway inflammation. Produced by a variety of cells within the lung, nitric oxide (NO) concentrations are generally low in healthy individuals. However, high concentrations of NO appear to be involved in nonspecific host defense mechanisms and chronic inflammatory  diseases such as asthma. The American Thoracic Society (ATS) therefore recommended using FENO to aid in the diagnosis and monitoring of eosinophilic airway inflammation and asthma, and for identifying steroid responsive individuals whose chronic respiratory symptoms may be caused by airway inflammation \par \par 6 minute walk test reveals a low saturation of 97% with very slight dyspnea or fatigue; walked 603.1 meters.\par \par -Images and procedures reviewed in detail and discussed with patient. \par

## 2021-05-20 ENCOUNTER — APPOINTMENT (OUTPATIENT)
Dept: PULMONOLOGY | Facility: CLINIC | Age: 71
End: 2021-05-20
Payer: MEDICAID

## 2021-05-20 VITALS
BODY MASS INDEX: 24.11 KG/M2 | RESPIRATION RATE: 17 BRPM | DIASTOLIC BLOOD PRESSURE: 70 MMHG | OXYGEN SATURATION: 98 % | SYSTOLIC BLOOD PRESSURE: 140 MMHG | TEMPERATURE: 96.8 F | HEIGHT: 66 IN | HEART RATE: 86 BPM | WEIGHT: 150 LBS

## 2021-05-20 PROCEDURE — 96372 THER/PROPH/DIAG INJ SC/IM: CPT

## 2021-05-20 RX ORDER — MEPOLIZUMAB 100 MG/ML
100 INJECTION, POWDER, FOR SOLUTION SUBCUTANEOUS
Qty: 0 | Refills: 0 | Status: COMPLETED | OUTPATIENT
Start: 2021-05-20

## 2021-06-07 ENCOUNTER — APPOINTMENT (OUTPATIENT)
Dept: ENDOCRINOLOGY | Facility: CLINIC | Age: 71
End: 2021-06-07

## 2021-06-08 ENCOUNTER — APPOINTMENT (OUTPATIENT)
Dept: ENDOCRINOLOGY | Facility: CLINIC | Age: 71
End: 2021-06-08
Payer: MEDICAID

## 2021-06-08 VITALS
HEART RATE: 98 BPM | SYSTOLIC BLOOD PRESSURE: 130 MMHG | HEIGHT: 66 IN | WEIGHT: 150 LBS | BODY MASS INDEX: 24.11 KG/M2 | OXYGEN SATURATION: 98 % | DIASTOLIC BLOOD PRESSURE: 60 MMHG

## 2021-06-08 DIAGNOSIS — R09.89 OTHER SPECIFIED SYMPTOMS AND SIGNS INVOLVING THE CIRCULATORY AND RESPIRATORY SYSTEMS: ICD-10-CM

## 2021-06-08 LAB
GLUCOSE BLDC GLUCOMTR-MCNC: 149
HBA1C MFR BLD HPLC: 7

## 2021-06-08 PROCEDURE — 99214 OFFICE O/P EST MOD 30 MIN: CPT | Mod: 25

## 2021-06-08 PROCEDURE — 83036 HEMOGLOBIN GLYCOSYLATED A1C: CPT | Mod: QW

## 2021-06-08 PROCEDURE — 82962 GLUCOSE BLOOD TEST: CPT

## 2021-06-08 RX ORDER — HYDROCORTISONE 0.5 G/100G
0.5 CREAM TOPICAL TWICE DAILY
Qty: 1 | Refills: 0 | Status: ACTIVE | COMMUNITY
Start: 2021-06-08 | End: 1900-01-01

## 2021-06-14 ENCOUNTER — TRANSCRIPTION ENCOUNTER (OUTPATIENT)
Age: 71
End: 2021-06-14

## 2021-06-15 LAB
CREAT SPEC-SCNC: 57 MG/DL
MICROALBUMIN 24H UR DL<=1MG/L-MCNC: <1.2 MG/DL
MICROALBUMIN/CREAT 24H UR-RTO: NORMAL MG/G

## 2021-06-17 ENCOUNTER — APPOINTMENT (OUTPATIENT)
Dept: PULMONOLOGY | Facility: CLINIC | Age: 71
End: 2021-06-17
Payer: MEDICAID

## 2021-06-17 VITALS
OXYGEN SATURATION: 98 % | HEIGHT: 66 IN | TEMPERATURE: 97.4 F | SYSTOLIC BLOOD PRESSURE: 140 MMHG | RESPIRATION RATE: 16 BRPM | WEIGHT: 148 LBS | DIASTOLIC BLOOD PRESSURE: 70 MMHG | BODY MASS INDEX: 23.78 KG/M2

## 2021-06-17 PROCEDURE — 96372 THER/PROPH/DIAG INJ SC/IM: CPT

## 2021-06-17 RX ORDER — MEPOLIZUMAB 100 MG/ML
100 INJECTION, POWDER, FOR SOLUTION SUBCUTANEOUS
Qty: 0 | Refills: 0 | Status: COMPLETED | OUTPATIENT
Start: 2021-06-17

## 2021-06-18 ENCOUNTER — APPOINTMENT (OUTPATIENT)
Dept: ULTRASOUND IMAGING | Facility: CLINIC | Age: 71
End: 2021-06-18
Payer: MEDICAID

## 2021-06-18 ENCOUNTER — OUTPATIENT (OUTPATIENT)
Dept: OUTPATIENT SERVICES | Facility: HOSPITAL | Age: 71
LOS: 1 days | End: 2021-06-18
Payer: MEDICAID

## 2021-06-18 ENCOUNTER — TRANSCRIPTION ENCOUNTER (OUTPATIENT)
Age: 71
End: 2021-06-18

## 2021-06-18 DIAGNOSIS — Z00.8 ENCOUNTER FOR OTHER GENERAL EXAMINATION: ICD-10-CM

## 2021-06-18 PROCEDURE — 93925 LOWER EXTREMITY STUDY: CPT | Mod: 26

## 2021-06-18 PROCEDURE — 93925 LOWER EXTREMITY STUDY: CPT

## 2021-07-14 ENCOUNTER — NON-APPOINTMENT (OUTPATIENT)
Age: 71
End: 2021-07-14

## 2021-07-14 ENCOUNTER — APPOINTMENT (OUTPATIENT)
Dept: OPHTHALMOLOGY | Facility: CLINIC | Age: 71
End: 2021-07-14
Payer: MEDICAID

## 2021-07-14 PROCEDURE — 92004 COMPRE OPH EXAM NEW PT 1/>: CPT

## 2021-07-15 ENCOUNTER — APPOINTMENT (OUTPATIENT)
Dept: PULMONOLOGY | Facility: CLINIC | Age: 71
End: 2021-07-15
Payer: MEDICAID

## 2021-07-15 ENCOUNTER — NON-APPOINTMENT (OUTPATIENT)
Age: 71
End: 2021-07-15

## 2021-07-15 VITALS
OXYGEN SATURATION: 98 % | HEIGHT: 66 IN | SYSTOLIC BLOOD PRESSURE: 130 MMHG | BODY MASS INDEX: 23.78 KG/M2 | HEART RATE: 89 BPM | TEMPERATURE: 97.5 F | RESPIRATION RATE: 16 BRPM | WEIGHT: 148 LBS | DIASTOLIC BLOOD PRESSURE: 80 MMHG

## 2021-07-15 PROCEDURE — 96372 THER/PROPH/DIAG INJ SC/IM: CPT

## 2021-07-15 RX ORDER — MEPOLIZUMAB 100 MG/ML
100 INJECTION, POWDER, FOR SOLUTION SUBCUTANEOUS
Qty: 0 | Refills: 0 | Status: COMPLETED | OUTPATIENT
Start: 2021-07-15

## 2021-07-27 ENCOUNTER — APPOINTMENT (OUTPATIENT)
Dept: CARDIOLOGY | Facility: CLINIC | Age: 71
End: 2021-07-27
Payer: MEDICAID

## 2021-07-27 VITALS
HEIGHT: 66 IN | BODY MASS INDEX: 24.09 KG/M2 | SYSTOLIC BLOOD PRESSURE: 145 MMHG | HEART RATE: 96 BPM | OXYGEN SATURATION: 100 % | WEIGHT: 149.91 LBS | DIASTOLIC BLOOD PRESSURE: 67 MMHG

## 2021-07-27 DIAGNOSIS — I25.10 ATHEROSCLEROTIC HEART DISEASE OF NATIVE CORONARY ARTERY W/OUT ANGINA PECTORIS: ICD-10-CM

## 2021-07-27 DIAGNOSIS — M79.606 PAIN IN LEG, UNSPECIFIED: ICD-10-CM

## 2021-07-27 DIAGNOSIS — Z86.39 PERSONAL HISTORY OF OTHER ENDOCRINE, NUTRITIONAL AND METABOLIC DISEASE: ICD-10-CM

## 2021-07-27 PROCEDURE — 99205 OFFICE O/P NEW HI 60 MIN: CPT

## 2021-07-29 NOTE — REVIEW OF SYSTEMS
[Negative] : Heme/Lymph [FreeTextEntry5] : see HPI [FreeTextEntry6] : see HPI [FreeTextEntry9] : see HPI

## 2021-07-29 NOTE — PHYSICAL EXAM
[Heart Rate And Rhythm] : heart rate and rhythm were normal [Heart Sounds] : normal S1 and S2 [Murmurs] : no murmurs present [Respiration, Rhythm And Depth] : normal respiratory rhythm and effort [Auscultation Breath Sounds / Voice Sounds] : lungs were clear to auscultation bilaterally [Bowel Sounds] : normal bowel sounds [Abdomen Soft] : soft [Abdomen Tenderness] : non-tender [Abnormal Walk] : normal gait [Cyanosis, Localized] : no localized cyanosis [] : no rash [No Skin Ulcers] : no skin ulcer [Oriented To Time, Place, And Person] : oriented to person, place, and time [FreeTextEntry1] : No LE Edema, Bounding Bilateral Palpable DP and PT Pulse. Visible reticular veins

## 2021-07-29 NOTE — HISTORY OF PRESENT ILLNESS
[FreeTextEntry1] : 7/27/21\par \par 70 y/o M w/ PMHX HTN HLD, DM, Asthma who reports L knee pain on ambulation or when climbing stairs.\par Denies claudication, rest pain or LE ulcerations.\par \par Reports had itchiness to legs when taking Januvia.\par Now off Januvia denies pruritus. \par \par  in 11/2020\par Noted to have calcific plaque in LAD on CT in 11/2020\par LE arterial duplex in 6/2021 showed no hemodynamically significant stenosis, however calcific plaque was noted.\par \par Reports he had prior chest discomfort, currently asymptomatic. No SOB.\par Primary MD / Cardiologist is Henrique Leyva\par \par Medications:\par Norvasc 5 mg daily\par Atorvastatin 40 mg daily\par Prednisone 2.5 mg every other day\par Inhalers\par Nucala injection monthly\par Omeprazole\par Famotidine\par Metformin\par Singulair

## 2021-07-29 NOTE — ASSESSMENT
[FreeTextEntry1] : Assessment:\par 1. Mild PAD\par     Bounding Distal Pulses\par     Asymptomatic\par 2. Leg Pruritus resolved off Januvia\par 3. Coronary Artery Calcifications on CT\par     Affecting LAD territory \par 4. Uncontrolled HLD\par 5. HTN\par 6. DM\par \par Plan:\par 1. Start ASA  81 mg daily with food.\par 2. Repeat labs including LDL and TSH.\par     If LDL is elevated will consider augmenting regimen.\par 3. Continue Lipitor 40 mg QHS for now.\par 4. Medical management for mild asymptomatic PAD.\par 5. Carotid Artery Duplex ordered for uncontrolled HLD and CAD.\par 6. Informed patient and patient's son to notify their Cardiologist (Dr. Espinosa) about his chest discomfort and coronary calcifications on CT. Patient's son took a picture to show Cardiologist.\par 7. If symptoms recur or worsen notify MD and seek immediate medical attention.\par 8. LE Venous duplex ordered for concern for prior leg discomfort, to rule out DVT (less likely).\par 9. Orthopedic follow-up for L knee discomfort.\par 10. Call with questions. Will call with test results.

## 2021-08-02 LAB
25(OH)D3 SERPL-MCNC: 46.5 NG/ML
ALBUMIN SERPL ELPH-MCNC: 4.8 G/DL
ALP BLD-CCNC: 61 U/L
ALT SERPL-CCNC: 15 U/L
ANION GAP SERPL CALC-SCNC: 12 MMOL/L
AST SERPL-CCNC: 16 U/L
BASOPHILS # BLD AUTO: 0.03 K/UL
BASOPHILS NFR BLD AUTO: 0.3 %
BILIRUB SERPL-MCNC: 0.6 MG/DL
BUN SERPL-MCNC: 13 MG/DL
CALCIUM SERPL-MCNC: 9.4 MG/DL
CHLORIDE SERPL-SCNC: 100 MMOL/L
CHOLEST SERPL-MCNC: 112 MG/DL
CO2 SERPL-SCNC: 28 MMOL/L
CREAT SERPL-MCNC: 0.93 MG/DL
EOSINOPHIL # BLD AUTO: 0.05 K/UL
EOSINOPHIL NFR BLD AUTO: 0.6 %
ESTIMATED AVERAGE GLUCOSE: 160 MG/DL
GLUCOSE SERPL-MCNC: 116 MG/DL
HBA1C MFR BLD HPLC: 7.2 %
HCT VFR BLD CALC: 38.1 %
HDLC SERPL-MCNC: 65 MG/DL
HGB BLD-MCNC: 12 G/DL
IMM GRANULOCYTES NFR BLD AUTO: 0.2 %
LDLC SERPL CALC-MCNC: 30 MG/DL
LYMPHOCYTES # BLD AUTO: 2.41 K/UL
LYMPHOCYTES NFR BLD AUTO: 26.5 %
MAN DIFF?: NORMAL
MCHC RBC-ENTMCNC: 26.3 PG
MCHC RBC-ENTMCNC: 31.5 GM/DL
MCV RBC AUTO: 83.4 FL
MONOCYTES # BLD AUTO: 0.9 K/UL
MONOCYTES NFR BLD AUTO: 9.9 %
NEUTROPHILS # BLD AUTO: 5.68 K/UL
NEUTROPHILS NFR BLD AUTO: 62.5 %
NONHDLC SERPL-MCNC: 47 MG/DL
PLATELET # BLD AUTO: 215 K/UL
POTASSIUM SERPL-SCNC: 4.1 MMOL/L
PROT SERPL-MCNC: 7 G/DL
RBC # BLD: 4.57 M/UL
RBC # FLD: 13.3 %
SODIUM SERPL-SCNC: 140 MMOL/L
TRIGL SERPL-MCNC: 87 MG/DL
TSH SERPL-ACNC: 1.58 UIU/ML
WBC # FLD AUTO: 9.09 K/UL

## 2021-08-09 ENCOUNTER — APPOINTMENT (OUTPATIENT)
Dept: ULTRASOUND IMAGING | Facility: CLINIC | Age: 71
End: 2021-08-09
Payer: MEDICAID

## 2021-08-09 ENCOUNTER — OUTPATIENT (OUTPATIENT)
Dept: OUTPATIENT SERVICES | Facility: HOSPITAL | Age: 71
LOS: 1 days | End: 2021-08-09
Payer: MEDICAID

## 2021-08-09 DIAGNOSIS — E78.5 HYPERLIPIDEMIA, UNSPECIFIED: ICD-10-CM

## 2021-08-09 PROCEDURE — 93970 EXTREMITY STUDY: CPT

## 2021-08-09 PROCEDURE — 93970 EXTREMITY STUDY: CPT | Mod: 26

## 2021-08-09 PROCEDURE — 93880 EXTRACRANIAL BILAT STUDY: CPT

## 2021-08-09 PROCEDURE — 93880 EXTRACRANIAL BILAT STUDY: CPT | Mod: 26

## 2021-08-24 ENCOUNTER — APPOINTMENT (OUTPATIENT)
Dept: PULMONOLOGY | Facility: CLINIC | Age: 71
End: 2021-08-24
Payer: MEDICAID

## 2021-08-24 VITALS
HEIGHT: 65 IN | TEMPERATURE: 97.2 F | SYSTOLIC BLOOD PRESSURE: 140 MMHG | WEIGHT: 143 LBS | OXYGEN SATURATION: 98 % | HEART RATE: 99 BPM | BODY MASS INDEX: 23.82 KG/M2 | DIASTOLIC BLOOD PRESSURE: 76 MMHG | RESPIRATION RATE: 16 BRPM

## 2021-08-24 PROCEDURE — 96372 THER/PROPH/DIAG INJ SC/IM: CPT

## 2021-08-24 PROCEDURE — 99214 OFFICE O/P EST MOD 30 MIN: CPT | Mod: 25

## 2021-08-24 RX ORDER — ATORVASTATIN CALCIUM 40 MG/1
40 TABLET, FILM COATED ORAL
Qty: 30 | Refills: 0 | Status: ACTIVE | COMMUNITY
Start: 2021-03-18

## 2021-08-24 RX ORDER — CALCIUM CARBONATE/VITAMIN D3 600 MG-10
600-400 TABLET ORAL
Qty: 60 | Refills: 0 | Status: ACTIVE | COMMUNITY
Start: 2021-03-18

## 2021-08-24 RX ORDER — CARBOXYMETHYLCELLULOSE SODIUM 5 MG/ML
0.5 SOLUTION/ DROPS OPHTHALMIC
Qty: 15 | Refills: 0 | Status: ACTIVE | COMMUNITY
Start: 2021-03-18

## 2021-08-24 RX ORDER — GLIPIZIDE 5 MG/1
5 TABLET, FILM COATED, EXTENDED RELEASE ORAL
Qty: 30 | Refills: 0 | Status: ACTIVE | COMMUNITY
Start: 2021-05-04

## 2021-08-24 RX ORDER — OMEPRAZOLE 20 MG/1
20 CAPSULE, DELAYED RELEASE ORAL
Qty: 30 | Refills: 0 | Status: ACTIVE | COMMUNITY
Start: 2021-03-18

## 2021-08-24 RX ORDER — MEPOLIZUMAB 100 MG/ML
100 INJECTION, POWDER, FOR SOLUTION SUBCUTANEOUS
Qty: 0 | Refills: 0 | Status: COMPLETED | OUTPATIENT
Start: 2021-08-24

## 2021-08-24 RX ORDER — NEOMYCIN AND POLYMYXIN B SULFATES AND HYDROCORTISONE OTIC 10; 3.5; 1 MG/ML; MG/ML; [USP'U]/ML
3.5-10000-1 SUSPENSION AURICULAR (OTIC)
Qty: 10 | Refills: 0 | Status: ACTIVE | COMMUNITY
Start: 2021-03-18

## 2021-08-24 RX ADMIN — MEPOLIZUMAB 0 MG: 100 INJECTION, POWDER, FOR SOLUTION SUBCUTANEOUS at 00:00

## 2021-08-24 NOTE — ASSESSMENT
[FreeTextEntry1] : Mr. Chapman is a 71 year old male  with a prior history of   diabetes mellitus, asthma for 10 years/ eosinophilic asthma, though most prominent in the last year following PNA and bronchitis, abnormal CT scan of the chest who now comes in for a pulmonary follow up. His asthma is currently active - but overall better ([place] hiatus 1/23-4/30). stable overall\par \par Problem 1: Severe Persistent Asthma (eosinophilic asthma) (controlled)\par -continue Nucala- did not receive in [place]  (in place since 10/2020) \par - Continue Duoneb via the nebulizer TID-QID \par -Add Breo Ellipta 200 at 1 inhalation QD\par -Add Spiriva respimat  2.5 qDay \par -continue Arnuity 200 \par - Continue Singulair 10 mg QHS \par - Asthma is believed to be caused by inherited (genetic) and environmental factor, but its exact cause is unknown. Asthma may be triggered by allergens, lung infections, or irritants in the air. Asthma triggers are different for each person. \par -Inhaler technique reviewed as well as oral hygiene techniques reviewed with patient. Avoidance of cold air, extremes of temperature, rescue inhaler should be used before exercise. Order of medication reviewed with patient. Recommended use of a cool mist humidifier in the bedroom. \par -The safety and efficacy of Nucala was established in three double-blind, randomized, placebo-controlled trials in patients with severe asthma. Compared to a placebo, patients with severe asthma receiving Nucala had fewer exacerbation requiring hospitalization and/or emergency department visits, and a longer time to first exacerbation. In addition, patients with severe asthma receiving Nucala or Fasenra experienced greater reductions in their daily maintenance oral corticosteroid dose, while maintaining asthma control compared with patients receiving placebo. Treatment with Nucala did not result in a significant improvement in lung function, as measured by the volume of air exhaled by patients in one second. The most common side effects include: headache, injection site reactions, back pain, weakness, and fatigue; hypersensitivity reactions can occur within hours or days including swelling of the face, mouth, and tongue, fainting, dizziness, hives, breathing problems, and rash; herpes zoster infections have occurred. The drug is a monoclonal antibody that inhibits interleukin -5 which helps regular eosinophils, a type of white blood cell that contributes to asthma. The over-production of eosinophils can cause inflammation in the lungs, increasing the frequency of asthma attacks. Patients must also take other medications, including high dose inhaled corticosteroids and at least one additional asthma drug \par \par Problem 1A: Corticosteroid- Dependent Asthma\par - Currently on prednisone 2.5 mg QOD\par - Considering Dupixent- initiating paper work (not a candidate) \par - Information sheet given to the patient to be reviewed, this medication is never to be used without consulting the prescribing physician. Proper dietary restraint is necessary specifically salt containing foods, if any reaction may occur should be reported. \par \par Problem 1B: Eosinophilic Asthma (5%, 400)\par -Candidate for Biologics (Fasenra, Nucala, Dupixent) \par -on Nucala (monthly)\par -The safety and efficacy of Nucala was established in three double-blind, randomized, placebo-controlled trials in patients with severe asthma. Compared to a placebo, patients with severe asthma receiving Nucala had fewer exacerbation requiring hospitalization and/or emergency department visits, and a longer time to first exacerbation. In addition, patients with severe asthma receiving Nucala or Fasenra experienced greater reductions in their daily maintenance oral corticosteroid dose, while maintaining asthma control compared with patients receiving placebo. Treatment with Nucala did not result in a significant improvement in lung function, as measured by the volume of air exhaled by patients in one second. The most common side effects include: headache, injection site reactions, back pain, weakness, and fatigue; hypersensitivity reactions can occur within hours or days including swelling of the face, mouth, and tongue, fainting, dizziness, hives, breathing problems, and rash; herpes zoster infections have occurred. The drug is a monoclonal antibody that inhibits interleukin-5 which helps regular eosinophils, a type of white blood cell that contributes to asthma. The over-production of eosinophils can cause inflammation in the lungs, increasing the frequency of asthma attacks. Patients must also take other medications, including high dose inhaled corticosteroids and at least one additional asthma drug.\par \par Problem 2: no tracheomalacia present\par - s/p Dynamic CT scan (-)\par  \par Problem 3: Allergy/ PND\par - Continue Flonase 1 sniff/nostril BID\par Environmental measures for allergies were encouraged including mattress and pillow covers, air purifier and environmental controls.\par \par Problem 4: GERD\par - Continue Omeprazole 40 mg qAM before breakfast\par -continue Pepcid 40 mg QHS \par - Things to avoid including overeating, spicy foods, tight clothing, eating within three hours of bed, this list is not all inclusive. \par - For treatment of reflux, possible options discussed including diet control, H2 blockers, PPIs, as well as coating motility agents discussed as treatment options. Timing of meals and proximity of last meal to sleep were discussed. If symptoms persist, a formal gastrointestinal evaluation is needed. \par \par Problem 5: R/o OSAS\par - Due to his EDS, snoring, elevated Mallampati class, he is being recommended for a home sleep study to r/o the diagnosis of OSAS.\par - Sleep apnea is associated with adverse clinical consequences which an affect most organ systems. Cardiovascular disease risk includes arrhythmias, atrial fibrillation, hypertension, coronary artery disease, and stroke. Metabolic disorders include diabetes type 2, non-alcoholic fatty liver disease. Mood disorder especially depression; and cognitive decline especially in the elderly. Associations with chronic reflux/Batista’s esophagus some but not all inclusive. \par -Reasons include arousal consistent with hypopnea; respiratory events most prominent in REM sleep or supine position; therefore sleep staging and body position are important for accurate diagnosis and estimation of AHI. \par \par Problem 6: Abnormal CT most c/w Inflammation -(resolved)\par - s/p chest CT scan (last 11/2020)- clear; no follow up needed \par -images reviewed and discussed with patient in detail \par \par Problem 7: Health maintenance \par - S/p Covid 19 vaccine (Moderna) x2\par -COVID-19 booster shot was discussed at length\par -s/p flu shot (2020)\par -recommended strep pneumonia vaccines: Prevnar-13 vaccine, followed by Pneumo vaccine 23 one year following\par -recommended early intervention for URIs\par -recommended regular osteoporosis evaluations\par -recommended early dermatological evaluations\par -recommended after the age of 50 to the age of 70, colonoscopy every 5 years\par \par \par f/u in 3 months with SPI and NiOx\par pt is encouraged to call or fax the office with any questions or concerns. \par Explained to the pt in full detail with demonstrations how to use the inhalers and inhaler hygiene. \par -Education provided to the PT regarding their visit and conditions.

## 2021-08-24 NOTE — ADDENDUM
[FreeTextEntry1] : Documented by Domingo Robbins acting as a scribe for Dr. Antonio Reyes on (08/24/2021).\par \par All medical record entries made by the Scribe were at my, Dr. Antonio Reyes's, direction and personally dictated by me on (08/24/2021). I have reviewed the chart and agree that the record accurately reflects my personal performance of the history, physical exam, assessment and plan. I have also personally directed, reviewed, and agree with the discharge instructions.\par

## 2021-08-24 NOTE — HISTORY OF PRESENT ILLNESS
[FreeTextEntry1] : Mr. Chapman is a 71 year old male with a history of severe persistent corticosteroid dependent Asthma, SOB, allergy presenting to the office today for a follow up visit. His chief complaint is \par -he notes feeling better in general\par -he notes some coughing in the morning\par -he notes doing some gardening work for exercise\par -he denies anything is holding him back from exercise\par -his son notes some mood swings which he thinks could be a side effect of the Rx\par -s/p COVID-19 vaccine\par -he notes receiving nucala every month\par \par patient denies any headaches, nausea, vomiting, fever, chills, sweats, chest pain, chest pressure, palpitations, wheezing, fatigue, diarrhea, constipation, dysphagia, myalgias, dizziness, leg swelling, leg pain, itchy eyes, itchy ears, heartburn, reflux or sour taste in the mouth

## 2021-09-24 ENCOUNTER — APPOINTMENT (OUTPATIENT)
Dept: PULMONOLOGY | Facility: CLINIC | Age: 71
End: 2021-09-24
Payer: MEDICAID

## 2021-09-24 PROCEDURE — 96372 THER/PROPH/DIAG INJ SC/IM: CPT

## 2021-09-24 RX ORDER — MEPOLIZUMAB 100 MG/ML
100 INJECTION, POWDER, FOR SOLUTION SUBCUTANEOUS
Qty: 0 | Refills: 0 | Status: COMPLETED | OUTPATIENT
Start: 2021-09-24

## 2021-10-22 ENCOUNTER — APPOINTMENT (OUTPATIENT)
Dept: PULMONOLOGY | Facility: CLINIC | Age: 71
End: 2021-10-22
Payer: MEDICAID

## 2021-10-22 VITALS
SYSTOLIC BLOOD PRESSURE: 130 MMHG | HEART RATE: 79 BPM | OXYGEN SATURATION: 99 % | TEMPERATURE: 97.5 F | DIASTOLIC BLOOD PRESSURE: 62 MMHG | RESPIRATION RATE: 16 BRPM | BODY MASS INDEX: 24.16 KG/M2 | HEIGHT: 65 IN | WEIGHT: 145 LBS

## 2021-10-22 PROCEDURE — 96372 THER/PROPH/DIAG INJ SC/IM: CPT

## 2021-10-22 RX ORDER — MEPOLIZUMAB 100 MG/ML
100 INJECTION, POWDER, FOR SOLUTION SUBCUTANEOUS
Qty: 0 | Refills: 0 | Status: COMPLETED | OUTPATIENT
Start: 2021-10-22

## 2021-11-16 ENCOUNTER — APPOINTMENT (OUTPATIENT)
Dept: ENDOCRINOLOGY | Facility: CLINIC | Age: 71
End: 2021-11-16
Payer: MEDICAID

## 2021-11-16 VITALS
HEIGHT: 65 IN | HEART RATE: 96 BPM | SYSTOLIC BLOOD PRESSURE: 140 MMHG | OXYGEN SATURATION: 97 % | WEIGHT: 150 LBS | TEMPERATURE: 97.9 F | DIASTOLIC BLOOD PRESSURE: 80 MMHG | BODY MASS INDEX: 24.99 KG/M2

## 2021-11-16 DIAGNOSIS — E78.5 HYPERLIPIDEMIA, UNSPECIFIED: ICD-10-CM

## 2021-11-16 LAB
GLUCOSE BLDC GLUCOMTR-MCNC: 144
HBA1C MFR BLD HPLC: 7.3

## 2021-11-16 PROCEDURE — 99214 OFFICE O/P EST MOD 30 MIN: CPT | Mod: 25

## 2021-11-16 PROCEDURE — 83036 HEMOGLOBIN GLYCOSYLATED A1C: CPT | Mod: QW

## 2021-11-16 PROCEDURE — 82962 GLUCOSE BLOOD TEST: CPT

## 2021-11-16 RX ORDER — ERGOCALCIFEROL 1.25 MG/1
1.25 MG CAPSULE, LIQUID FILLED ORAL
Qty: 12 | Refills: 0 | Status: DISCONTINUED | COMMUNITY
Start: 2019-01-25 | End: 2021-11-16

## 2021-11-16 RX ORDER — MULTIVIT-MIN/FOLIC/VIT K/LYCOP 400-300MCG
25 MCG TABLET ORAL DAILY
Qty: 90 | Refills: 3 | Status: ACTIVE | COMMUNITY
Start: 2021-11-16 | End: 1900-01-01

## 2021-11-18 ENCOUNTER — NON-APPOINTMENT (OUTPATIENT)
Age: 71
End: 2021-11-18

## 2021-11-18 ENCOUNTER — APPOINTMENT (OUTPATIENT)
Dept: PULMONOLOGY | Facility: CLINIC | Age: 71
End: 2021-11-18
Payer: MEDICAID

## 2021-11-18 VITALS
BODY MASS INDEX: 24.32 KG/M2 | DIASTOLIC BLOOD PRESSURE: 60 MMHG | OXYGEN SATURATION: 98 % | HEIGHT: 65 IN | WEIGHT: 146 LBS | RESPIRATION RATE: 16 BRPM | TEMPERATURE: 97.4 F | SYSTOLIC BLOOD PRESSURE: 130 MMHG | HEART RATE: 83 BPM

## 2021-11-18 DIAGNOSIS — Z23 ENCOUNTER FOR IMMUNIZATION: ICD-10-CM

## 2021-11-18 PROCEDURE — 99214 OFFICE O/P EST MOD 30 MIN: CPT | Mod: 25

## 2021-11-18 PROCEDURE — 94010 BREATHING CAPACITY TEST: CPT

## 2021-11-18 PROCEDURE — 90662 IIV NO PRSV INCREASED AG IM: CPT

## 2021-11-18 PROCEDURE — G0008: CPT

## 2021-11-18 PROCEDURE — 96372 THER/PROPH/DIAG INJ SC/IM: CPT

## 2021-11-18 RX ORDER — DICLOFENAC SODIUM 1% 10 MG/G
1 GEL TOPICAL
Qty: 100 | Refills: 0 | Status: ACTIVE | COMMUNITY
Start: 2021-10-27

## 2021-11-18 RX ORDER — MEPOLIZUMAB 100 MG/ML
100 INJECTION, POWDER, FOR SOLUTION SUBCUTANEOUS
Qty: 0 | Refills: 0 | Status: COMPLETED | OUTPATIENT
Start: 2021-11-18

## 2021-11-18 RX ADMIN — MEPOLIZUMAB 1 MG: 100 INJECTION, POWDER, FOR SOLUTION SUBCUTANEOUS at 00:00

## 2021-11-18 NOTE — ASSESSMENT
[FreeTextEntry1] : Mr. Chapman is a 71 year old male  with a prior history of   diabetes mellitus, asthma for 10 years/ eosinophilic asthma, though most prominent in the last year following PNA and bronchitis, abnormal CT scan of the chest who now comes in for a pulmonary follow up. His asthma is currently active - but overall better ([place] hiatus 1/23-4/30). stable overall despite pharmacy issue\par \par Problem 1: Severe Persistent Asthma (eosinophilic asthma) (controlled)\par -continue Nucala- did not receive in [place]  (in place since 10/2020) \par - Continue Duoneb via the nebulizer TID-QID \par -continue Breo Ellipta 200 at 1 inhalation QD\par -continue Spiriva respimat  2.5 qDay \par -continue Arnuity 200 \par - Continue Singulair 10 mg QHS \par - Asthma is believed to be caused by inherited (genetic) and environmental factor, but its exact cause is unknown. Asthma may be triggered by allergens, lung infections, or irritants in the air. Asthma triggers are different for each person. \par -Inhaler technique reviewed as well as oral hygiene techniques reviewed with patient. Avoidance of cold air, extremes of temperature, rescue inhaler should be used before exercise. Order of medication reviewed with patient. Recommended use of a cool mist humidifier in the bedroom. \par -The safety and efficacy of Nucala was established in three double-blind, randomized, placebo-controlled trials in patients with severe asthma. Compared to a placebo, patients with severe asthma receiving Nucala had fewer exacerbation requiring hospitalization and/or emergency department visits, and a longer time to first exacerbation. In addition, patients with severe asthma receiving Nucala or Fasenra experienced greater reductions in their daily maintenance oral corticosteroid dose, while maintaining asthma control compared with patients receiving placebo. Treatment with Nucala did not result in a significant improvement in lung function, as measured by the volume of air exhaled by patients in one second. The most common side effects include: headache, injection site reactions, back pain, weakness, and fatigue; hypersensitivity reactions can occur within hours or days including swelling of the face, mouth, and tongue, fainting, dizziness, hives, breathing problems, and rash; herpes zoster infections have occurred. The drug is a monoclonal antibody that inhibits interleukin -5 which helps regular eosinophils, a type of white blood cell that contributes to asthma. The over-production of eosinophils can cause inflammation in the lungs, increasing the frequency of asthma attacks. Patients must also take other medications, including high dose inhaled corticosteroids and at least one additional asthma drug \par \par Problem 1A: Corticosteroid- Dependent Asthma\par - Currently on prednisone 2.5 mg QOD\par - Considering Dupixent- initiating paper work (not a candidate) \par - Information sheet given to the patient to be reviewed, this medication is never to be used without consulting the prescribing physician. Proper dietary restraint is necessary specifically salt containing foods, if any reaction may occur should be reported. \par \par Problem 1B: Eosinophilic Asthma (5%, 400)\par -Candidate for Biologics (Fasenra, Nucala, Dupixent) \par -on Nucala (monthly)\par -The safety and efficacy of Nucala was established in three double-blind, randomized, placebo-controlled trials in patients with severe asthma. Compared to a placebo, patients with severe asthma receiving Nucala had fewer exacerbation requiring hospitalization and/or emergency department visits, and a longer time to first exacerbation. In addition, patients with severe asthma receiving Nucala or Fasenra experienced greater reductions in their daily maintenance oral corticosteroid dose, while maintaining asthma control compared with patients receiving placebo. Treatment with Nucala did not result in a significant improvement in lung function, as measured by the volume of air exhaled by patients in one second. The most common side effects include: headache, injection site reactions, back pain, weakness, and fatigue; hypersensitivity reactions can occur within hours or days including swelling of the face, mouth, and tongue, fainting, dizziness, hives, breathing problems, and rash; herpes zoster infections have occurred. The drug is a monoclonal antibody that inhibits interleukin-5 which helps regular eosinophils, a type of white blood cell that contributes to asthma. The over-production of eosinophils can cause inflammation in the lungs, increasing the frequency of asthma attacks. Patients must also take other medications, including high dose inhaled corticosteroids and at least one additional asthma drug.\par \par Problem 2: no tracheomalacia present\par - s/p Dynamic CT scan (-)\par  \par Problem 3: Allergy/ PND\par - Continue Flonase 1 sniff/nostril BID\par Environmental measures for allergies were encouraged including mattress and pillow covers, air purifier and environmental controls.\par \par Problem 4: GERD\par - Continue Omeprazole 40 mg qAM before breakfast\par -continue Pepcid 40 mg QHS \par - Things to avoid including overeating, spicy foods, tight clothing, eating within three hours of bed, this list is not all inclusive. \par - For treatment of reflux, possible options discussed including diet control, H2 blockers, PPIs, as well as coating motility agents discussed as treatment options. Timing of meals and proximity of last meal to sleep were discussed. If symptoms persist, a formal gastrointestinal evaluation is needed. \par \par Problem 5: R/o OSAS\par - Due to his EDS, snoring, elevated Mallampati class, he is being recommended for a home sleep study to r/o the diagnosis of OSAS.\par - Sleep apnea is associated with adverse clinical consequences which an affect most organ systems. Cardiovascular disease risk includes arrhythmias, atrial fibrillation, hypertension, coronary artery disease, and stroke. Metabolic disorders include diabetes type 2, non-alcoholic fatty liver disease. Mood disorder especially depression; and cognitive decline especially in the elderly. Associations with chronic reflux/Batista’s esophagus some but not all inclusive. \par -Reasons include arousal consistent with hypopnea; respiratory events most prominent in REM sleep or supine position; therefore sleep staging and body position are important for accurate diagnosis and estimation of AHI. \par \par Problem 6: Abnormal CT most c/w Inflammation -(resolved)\par - s/p chest CT scan (last 11/2020)- clear; no follow up needed \par -images reviewed and discussed with patient in detail \par \par Problem 7: Health maintenance \par - S/p Covid 19 vaccine (Moderna) x3\par -Covid 19 vaccine discussed at length with patient and the patient was recommended to wait for the booster containing the delta variant \par -COVID-19 booster shot was discussed at length\par -s/p flu shot (2021)\par -recommended strep pneumonia vaccines: Prevnar-13 vaccine, followed by Pneumo vaccine 23 one year following\par -recommended early intervention for URIs\par -recommended regular osteoporosis evaluations\par -recommended early dermatological evaluations\par -recommended after the age of 50 to the age of 70, colonoscopy every 5 years\par \par \par f/u in 3 months with SPI and NiOx\par pt is encouraged to call or fax the office with any questions or concerns. \par Explained to the pt in full detail with demonstrations how to use the inhalers and inhaler hygiene. \par -Education provided to the PT regarding their visit and conditions.

## 2021-11-18 NOTE — HISTORY OF PRESENT ILLNESS
[FreeTextEntry1] : Mr. Chapman is a 71 year old male with a history of severe persistent corticosteroid dependent Asthma, SOB, allergy presenting to the office today for a follow up visit. His chief complaint is \par -he notes he has been walking using a treadmill\par -he notes he has been sleeping well\par -he notes he has been getting 6 hours of sleep\par -he notes his weight has been stable\par -no new medications, vitamins, or supplements \par -he notes the pharmacy did not give him Breo recently\par -he notes his energy level is good\par -he notes his appetite is normal\par -s/p COVID-19 vaccine x2 (3/2021)\par \par -patient denies any headaches, nausea, vomiting, fever, chills, sweats, chest pain, chest pressure, palpitations, coughing, wheezing, fatigue, diarrhea, constipation, dysphagia, myalgias, dizziness, leg swelling, leg pain, itchy eyes, itchy ears, heartburn, reflux or sour taste in the mouth

## 2021-11-18 NOTE — ADDENDUM
[FreeTextEntry1] : Documented by Domingo Robbins acting as a scribe for Dr. Antonio Reyes on 11/18/2021\par \par All medical record entries made by the scribe were at my, Dr. Antonio Reyes's, direction and personally dictated by me on 11/18/2021. I have reviewed the chart and agree that the record accurately reflects my personal performance of the history, physical exam, assessment, and plan. I have also personally directed, reviewed, and agree with the discharge instructions.

## 2021-11-18 NOTE — PROCEDURE
[FreeTextEntry1] : PFT revealed mild restrictive dysfunction, with a FEV1 of 1.87L, which is 73% of predicted, with a normal flow volume loop \par \par FENO was unable to be performed; a normal value being less than 25. Fractional exhaled nitric oxide (FENO) is regarded as a simple, noninvasive method for assessing eosinophilic airway inflammation. Produced by a variety of cells within the lung, nitric oxide (NO) concentrations are generally low in healthy individuals. However, high concentrations of NO appear to be involved in nonspecific host defense mechanisms and chronic inflammatory  diseases such as asthma. The American Thor[employer] Society (ATS) therefore recommended using FENO to aid in the diagnosis and monitoring of eosinophilic airway inflammation and asthma, and for identifying steroid responsive individuals whose chronic respiratory symptoms may be caused by airway inflammation

## 2021-12-16 ENCOUNTER — APPOINTMENT (OUTPATIENT)
Dept: PULMONOLOGY | Facility: CLINIC | Age: 71
End: 2021-12-16
Payer: MEDICAID

## 2021-12-16 VITALS
WEIGHT: 145 LBS | SYSTOLIC BLOOD PRESSURE: 130 MMHG | TEMPERATURE: 97.8 F | RESPIRATION RATE: 16 BRPM | OXYGEN SATURATION: 98 % | BODY MASS INDEX: 24.16 KG/M2 | HEART RATE: 83 BPM | HEIGHT: 65 IN | DIASTOLIC BLOOD PRESSURE: 83 MMHG

## 2021-12-16 PROCEDURE — 96372 THER/PROPH/DIAG INJ SC/IM: CPT

## 2021-12-16 RX ORDER — MEPOLIZUMAB 100 MG/ML
100 INJECTION, POWDER, FOR SOLUTION SUBCUTANEOUS
Qty: 0 | Refills: 0 | Status: COMPLETED | OUTPATIENT
Start: 2021-12-16

## 2021-12-27 RX ORDER — ALBUTEROL SULFATE 90 UG/1
108 (90 BASE) AEROSOL, METERED RESPIRATORY (INHALATION) EVERY 6 HOURS
Qty: 1 | Refills: 1 | Status: ACTIVE | COMMUNITY
Start: 2021-12-27 | End: 1900-01-01

## 2021-12-28 ENCOUNTER — APPOINTMENT (OUTPATIENT)
Dept: PULMONOLOGY | Facility: CLINIC | Age: 71
End: 2021-12-28
Payer: MEDICAID

## 2021-12-28 VITALS
HEART RATE: 93 BPM | RESPIRATION RATE: 16 BRPM | SYSTOLIC BLOOD PRESSURE: 140 MMHG | DIASTOLIC BLOOD PRESSURE: 80 MMHG | HEIGHT: 65 IN | BODY MASS INDEX: 24.16 KG/M2 | WEIGHT: 145 LBS | OXYGEN SATURATION: 98 % | TEMPERATURE: 97.4 F

## 2021-12-28 PROCEDURE — 99214 OFFICE O/P EST MOD 30 MIN: CPT

## 2021-12-28 RX ORDER — MUCUS CLEARING DEVICE
EACH MISCELLANEOUS
Qty: 1 | Refills: 0 | Status: ACTIVE | OUTPATIENT
Start: 2021-12-28

## 2021-12-28 RX ORDER — PROMETHAZINE HYDROCHLORIDE AND DEXTROMETHORPHAN HYDROBROMIDE ORAL SOLUTION 15; 6.25 MG/5ML; MG/5ML
6.25-15 SOLUTION ORAL
Qty: 480 | Refills: 1 | Status: ACTIVE | COMMUNITY
Start: 2021-12-28 | End: 1900-01-01

## 2021-12-28 RX ORDER — AMOXICILLIN 500 MG/1
500 CAPSULE ORAL
Qty: 21 | Refills: 0 | Status: DISCONTINUED | COMMUNITY
Start: 2021-04-27 | End: 2021-12-28

## 2021-12-28 NOTE — ADDENDUM
[FreeTextEntry1] : Documented by Domingo Robbins acting as a scribe for Dr. Antonio Reyes on 12/28/2021\par \par All medical record entries made by the scribe were at my, Dr. Antonio Reyes's, direction and personally dictated by me on 12/28/2021. I have reviewed the chart and agree that the record accurately reflects my personal performance of the history, physical exam, assessment, and plan. I have also personally directed, reviewed, and agree with the discharge instructions.

## 2021-12-28 NOTE — ASSESSMENT
[FreeTextEntry1] : Mr. Chapman is a 71 year old male  with a prior history of   diabetes mellitus, asthma for 10 years/ eosinophilic asthma, though most prominent in the last year following PNA and bronchitis, abnormal CT scan of the chest who now comes in for a pulmonary follow up. His asthma is currently active - but overall better ([place] hiatus 1/23-4/30).  - active asthma 12/2021\par \par Problem 1: Severe Persistent Asthma (eosinophilic asthma) (active)\par -continue Nucala- did not receive in [place]  (in place since 10/2020) \par - Continue Duoneb via the nebulizer TID-QID \par -continue Breo Ellipta 200 at 1 inhalation QD\par -continue Spiriva respimat  2.5 qDay \par -continue Arnuity 200 \par - Continue Singulair 10 mg QHS \par -Add Aerobika - mucus clearance device \par - Asthma is believed to be caused by inherited (genetic) and environmental factor, but its exact cause is unknown. Asthma may be triggered by allergens, lung infections, or irritants in the air. Asthma triggers are different for each person. \par -Inhaler technique reviewed as well as oral hygiene techniques reviewed with patient. Avoidance of cold air, extremes of temperature, rescue inhaler should be used before exercise. Order of medication reviewed with patient. Recommended use of a cool mist humidifier in the bedroom. \par -The safety and efficacy of Nucala was established in three double-blind, randomized, placebo-controlled trials in patients with severe asthma. Compared to a placebo, patients with severe asthma receiving Nucala had fewer exacerbation requiring hospitalization and/or emergency department visits, and a longer time to first exacerbation. In addition, patients with severe asthma receiving Nucala or Fasenra experienced greater reductions in their daily maintenance oral corticosteroid dose, while maintaining asthma control compared with patients receiving placebo. Treatment with Nucala did not result in a significant improvement in lung function, as measured by the volume of air exhaled by patients in one second. The most common side effects include: headache, injection site reactions, back pain, weakness, and fatigue; hypersensitivity reactions can occur within hours or days including swelling of the face, mouth, and tongue, fainting, dizziness, hives, breathing problems, and rash; herpes zoster infections have occurred. The drug is a monoclonal antibody that inhibits interleukin -5 which helps regular eosinophils, a type of white blood cell that contributes to asthma. The over-production of eosinophils can cause inflammation in the lungs, increasing the frequency of asthma attacks. Patients must also take other medications, including high dose inhaled corticosteroids and at least one additional asthma drug \par \par Problem 1A: Corticosteroid- Dependent Asthma\par - Currently on prednisone 2.5 mg QOD\par - Considering Dupixent- initiating paper work (not a candidate) \par - Information sheet given to the patient to be reviewed, this medication is never to be used without consulting the prescribing physician. Proper dietary restraint is necessary specifically salt containing foods, if any reaction may occur should be reported. \par \par Problem 1B: Eosinophilic Asthma (5%, 400)\par -Candidate for Biologics (Fasenra, Nucala, Dupixent) \par -on Nucala (monthly)\par -The safety and efficacy of Nucala was established in three double-blind, randomized, placebo-controlled trials in patients with severe asthma. Compared to a placebo, patients with severe asthma receiving Nucala had fewer exacerbation requiring hospitalization and/or emergency department visits, and a longer time to first exacerbation. In addition, patients with severe asthma receiving Nucala or Fasenra experienced greater reductions in their daily maintenance oral corticosteroid dose, while maintaining asthma control compared with patients receiving placebo. Treatment with Nucala did not result in a significant improvement in lung function, as measured by the volume of air exhaled by patients in one second. The most common side effects include: headache, injection site reactions, back pain, weakness, and fatigue; hypersensitivity reactions can occur within hours or days including swelling of the face, mouth, and tongue, fainting, dizziness, hives, breathing problems, and rash; herpes zoster infections have occurred. The drug is a monoclonal antibody that inhibits interleukin-5 which helps regular eosinophils, a type of white blood cell that contributes to asthma. The over-production of eosinophils can cause inflammation in the lungs, increasing the frequency of asthma attacks. Patients must also take other medications, including high dose inhaled corticosteroids and at least one additional asthma drug.\par \par Problem 2: no tracheomalacia present\par - s/p Dynamic CT scan (-)\par  \par Problem 3: Allergy/ PND\par - Continue Flonase 1 sniff/nostril BID\par Environmental measures for allergies were encouraged including mattress and pillow covers, air purifier and environmental controls.\par \par Problem 4: GERD\par - Continue Omeprazole 40 mg qAM before breakfast\par -continue Pepcid 40 mg QHS \par - Things to avoid including overeating, spicy foods, tight clothing, eating within three hours of bed, this list is not all inclusive. \par - For treatment of reflux, possible options discussed including diet control, H2 blockers, PPIs, as well as coating motility agents discussed as treatment options. Timing of meals and proximity of last meal to sleep were discussed. If symptoms persist, a formal gastrointestinal evaluation is needed. \par \par Problem 5: R/o OSAS\par - Due to his EDS, snoring, elevated Mallampati class, he is being recommended for a home sleep study to r/o the diagnosis of OSAS.\par - Sleep apnea is associated with adverse clinical consequences which an affect most organ systems. Cardiovascular disease risk includes arrhythmias, atrial fibrillation, hypertension, coronary artery disease, and stroke. Metabolic disorders include diabetes type 2, non-alcoholic fatty liver disease. Mood disorder especially depression; and cognitive decline especially in the elderly. Associations with chronic reflux/Batista’s esophagus some but not all inclusive. \par -Reasons include arousal consistent with hypopnea; respiratory events most prominent in REM sleep or supine position; therefore sleep staging and body position are important for accurate diagnosis and estimation of AHI. \par \par Problem 6: Abnormal CT most c/w Inflammation -(resolved)\par - s/p chest CT scan (last 11/2020)- clear; no follow up needed \par -images reviewed and discussed with patient in detail \par \par Problem 7: Health maintenance \par - S/p Covid 19 vaccine (Moderna) x3\par -Covid 19 vaccine discussed at length with patient and the patient was recommended to wait for the booster containing the delta variant \par -COVID-19 booster shot was discussed at length\par -s/p flu shot (2021)\par -recommended strep pneumonia vaccines: Prevnar-13 vaccine, followed by Pneumo vaccine 23 one year following\par -recommended early intervention for URIs\par -recommended regular osteoporosis evaluations\par -recommended early dermatological evaluations\par -recommended after the age of 50 to the age of 70, colonoscopy every 5 years\par \par \par f/u in 3 months with SPI and NiOx\par pt is encouraged to call or fax the office with any questions or concerns. \par Explained to the pt in full detail with demonstrations how to use the inhalers and inhaler hygiene. \par -Education provided to the PT regarding their visit and conditions.

## 2021-12-28 NOTE — PHYSICAL EXAM
[No Acute Distress] : no acute distress [Normal Oropharynx] : normal oropharynx [III] : Mallampati Class: III [Normal Appearance] : normal appearance [No Neck Mass] : no neck mass [Normal Rate/Rhythm] : normal rate/rhythm [Normal S1, S2] : normal s1, s2 [No Murmurs] : no murmurs [No Resp Distress] : no resp distress [Clear to Auscultation Bilaterally] : clear to auscultation bilaterally [No Abnormalities] : no abnormalities [Benign] : benign [Normal Gait] : normal gait [No Clubbing] : no clubbing [No Cyanosis] : no cyanosis [No Edema] : no edema [FROM] : FROM [Normal Color/ Pigmentation] : normal color/ pigmentation [No Focal Deficits] : no focal deficits [Oriented x3] : oriented x3 [Normal Affect] : normal affect [TextBox_68] : I:E 1:3; mild expiratory wheeze

## 2021-12-28 NOTE — HISTORY OF PRESENT ILLNESS
[FreeTextEntry1] : Mr. Chapman is a 71 year old male with a history of severe persistent corticosteroid dependent Asthma, SOB, allergy presenting to the office today for a follow up visit. His chief complaint is \par -he notes feeling chest congestion occasionally\par -he notes it does not happen every day\par -he notes the congestion is intermittent and can happen at any time\par -he denies any visual issues \par -he notes his sense of smell and taste are normal\par -he notes he is sleeping well\par -he notes his bowels are regular \par -he denies any hoarseness\par -no new medications, vitamins, or supplements \par -he denies traveling\par -he notes his blood sugar has been good\par -he notes he has been getting the Nucala\par -he notes he has started taking Mucinex recently\par -he notes he was recently given Erythromycin\par -he notes he is using the nebulizer\par \par -patient denies any headaches, nausea, vomiting, fever, chills, sweats, chest pain, chest pressure, palpitations, coughing, wheezing, fatigue, diarrhea, constipation, dysphagia, myalgias, dizziness, leg swelling, leg pain, itchy eyes, itchy ears, heartburn, reflux or sour taste in the mouth

## 2021-12-28 NOTE — PROCEDURE
[FreeTextEntry1] : PFT (11/18/2021) revealed normal flows, with a FEV1 of 1.87L, which is 73% of predicted, with a normal flow volume loop\par \par FENO (11/18/2021) was unable to be performed; a normal value being less than 25. Fractional exhaled nitric oxide (FENO) is regarded as a simple, noninvasive method for assessing eosinophilic airway inflammation. Produced by a variety of cells within the lung, nitric oxide (NO) concentrations are generally low in healthy individuals. However, high concentrations of NO appear to be involved in nonspecific host defense mechanisms and chronic inflammatory  diseases such as asthma. The American Thoracic Society (ATS) therefore recommended using FENO to aid in the diagnosis and monitoring of eosinophilic airway inflammation and asthma, and for identifying steroid responsive individuals whose chronic respiratory symptoms may be caused by airway inflammation \par \par -Images and procedures reviewed in detail and discussed with patient.

## 2022-01-13 ENCOUNTER — APPOINTMENT (OUTPATIENT)
Dept: PULMONOLOGY | Facility: CLINIC | Age: 72
End: 2022-01-13
Payer: MEDICAID

## 2022-01-13 VITALS
SYSTOLIC BLOOD PRESSURE: 134 MMHG | BODY MASS INDEX: 23.38 KG/M2 | RESPIRATION RATE: 16 BRPM | OXYGEN SATURATION: 98 % | HEIGHT: 66 IN | DIASTOLIC BLOOD PRESSURE: 78 MMHG | HEART RATE: 90 BPM | TEMPERATURE: 97.8 F | WEIGHT: 145.5 LBS

## 2022-01-13 PROCEDURE — 96372 THER/PROPH/DIAG INJ SC/IM: CPT

## 2022-01-13 RX ORDER — MEPOLIZUMAB 100 MG/ML
100 INJECTION, POWDER, FOR SOLUTION SUBCUTANEOUS
Qty: 0 | Refills: 0 | Status: COMPLETED | OUTPATIENT
Start: 2022-01-13

## 2022-02-10 ENCOUNTER — APPOINTMENT (OUTPATIENT)
Dept: PULMONOLOGY | Facility: CLINIC | Age: 72
End: 2022-02-10
Payer: MEDICAID

## 2022-02-10 VITALS
WEIGHT: 143 LBS | HEIGHT: 65 IN | DIASTOLIC BLOOD PRESSURE: 70 MMHG | OXYGEN SATURATION: 98 % | BODY MASS INDEX: 23.82 KG/M2 | TEMPERATURE: 97.8 F | SYSTOLIC BLOOD PRESSURE: 134 MMHG | RESPIRATION RATE: 16 BRPM | HEART RATE: 93 BPM

## 2022-02-10 PROCEDURE — 96372 THER/PROPH/DIAG INJ SC/IM: CPT

## 2022-02-10 RX ORDER — MEPOLIZUMAB 100 MG/ML
100 INJECTION, POWDER, FOR SOLUTION SUBCUTANEOUS
Qty: 0 | Refills: 0 | Status: COMPLETED | OUTPATIENT
Start: 2022-02-09

## 2022-02-14 ENCOUNTER — NON-APPOINTMENT (OUTPATIENT)
Age: 72
End: 2022-02-14

## 2022-02-14 ENCOUNTER — APPOINTMENT (OUTPATIENT)
Dept: OPHTHALMOLOGY | Facility: CLINIC | Age: 72
End: 2022-02-14
Payer: MEDICAID

## 2022-02-14 PROCEDURE — 68761 CLOSE TEAR DUCT OPENING: CPT | Mod: E2,E4

## 2022-02-14 PROCEDURE — 95060 OPH MUCOUS MEMBRANE TESTS: CPT

## 2022-03-14 ENCOUNTER — APPOINTMENT (OUTPATIENT)
Dept: PULMONOLOGY | Facility: CLINIC | Age: 72
End: 2022-03-14
Payer: MEDICAID

## 2022-03-14 ENCOUNTER — NON-APPOINTMENT (OUTPATIENT)
Age: 72
End: 2022-03-14

## 2022-03-14 VITALS
DIASTOLIC BLOOD PRESSURE: 70 MMHG | OXYGEN SATURATION: 96 % | BODY MASS INDEX: 25.16 KG/M2 | HEIGHT: 65 IN | HEART RATE: 106 BPM | WEIGHT: 151 LBS | RESPIRATION RATE: 16 BRPM | TEMPERATURE: 97.8 F | SYSTOLIC BLOOD PRESSURE: 148 MMHG

## 2022-03-14 PROCEDURE — 99214 OFFICE O/P EST MOD 30 MIN: CPT | Mod: 25

## 2022-03-14 PROCEDURE — 96372 THER/PROPH/DIAG INJ SC/IM: CPT

## 2022-03-14 PROCEDURE — 94010 BREATHING CAPACITY TEST: CPT

## 2022-03-14 PROCEDURE — 95012 NITRIC OXIDE EXP GAS DETER: CPT

## 2022-03-14 RX ORDER — MEPOLIZUMAB 100 MG/ML
100 INJECTION, POWDER, FOR SOLUTION SUBCUTANEOUS
Qty: 0 | Refills: 0 | Status: COMPLETED | OUTPATIENT
Start: 2022-03-14

## 2022-03-14 RX ADMIN — MEPOLIZUMAB 1 MG: 100 INJECTION, POWDER, FOR SOLUTION SUBCUTANEOUS at 00:00

## 2022-03-14 NOTE — PROCEDURE
[FreeTextEntry1] : PFT revealed moderate restrictive dysfunction, with a FEV1 of 1.44L, which is 56% of predicted, with a normal flow volume loop\par  \par Feno was 265; a normal value being less than 25. Fractional exhaled nitric oxide (FENO) is regarded as a simple, noninvasive method for assessing eosinophilic airway inflammation. Produced by a variety of cells within the lung, nitric oxide (NO) concentrations are generally low in healthy individuals. However, high concentrations of NO appear to be involved in nonspecific host defense mechanisms and chronic inflammatory  diseases such as asthma. The American Thoracic Society (ATS) therefore recommended using FENO to aid in the diagnosis and monitoring of eosinophilic airway inflammation and asthma, and for identifying steroid responsive individuals whose chronic respiratory symptoms may be caused by airway inflammation

## 2022-03-14 NOTE — ADDENDUM
[FreeTextEntry1] : Documented by Corwin Rodriguez acting as a scribe for Dr. Antonio Reyes on  (03/14/2022).\par \par All medical record entries made by the Scribe were at my, Dr. Antonio Reyes's, direction and personally dictated by me on  (03/14/2022). I have reviewed the chart and agree that the record accurately reflects my personal performance of the history, physical exam, assessment and plan. I have also personally directed, reviewed, and agree with the discharge instructions. \par

## 2022-03-14 NOTE — ASSESSMENT
[FreeTextEntry1] : Mr. Chapman is a 71 year old male  with a prior history of   diabetes mellitus, asthma for 10 years/ eosinophilic asthma, though most prominent in the last year following PNA and bronchitis, abnormal CT scan of the chest who now comes in for a pulmonary follow up. His asthma is currently active - but overall better ([place] hiatus 1/23-4/30).  - active asthma 12/2021- now stable \par \par Problem 1: Severe Persistent Asthma (eosinophilic asthma)  (quiet)\par -continue Nucala- did not receive in [place]  (in place since 10/2020) \par - Continue Duoneb via the nebulizer TID-QID \par -continue Breo Ellipta 200 at 1 inhalation QD\par -continue Spiriva respimat  2.5 qDay \par -continue Arnuity 200 \par - Continue Singulair 10 mg QHS \par -Add Aerobika - mucus clearance device \par - Asthma is believed to be caused by inherited (genetic) and environmental factor, but its exact cause is unknown. Asthma may be triggered by allergens, lung infections, or irritants in the air. Asthma triggers are different for each person. \par -Inhaler technique reviewed as well as oral hygiene techniques reviewed with patient. Avoidance of cold air, extremes of temperature, rescue inhaler should be used before exercise. Order of medication reviewed with patient. Recommended use of a cool mist humidifier in the bedroom. \par -The safety and efficacy of Nucala was established in three double-blind, randomized, placebo-controlled trials in patients with severe asthma. Compared to a placebo, patients with severe asthma receiving Nucala had fewer exacerbation requiring hospitalization and/or emergency department visits, and a longer time to first exacerbation. In addition, patients with severe asthma receiving Nucala or Fasenra experienced greater reductions in their daily maintenance oral corticosteroid dose, while maintaining asthma control compared with patients receiving placebo. Treatment with Nucala did not result in a significant improvement in lung function, as measured by the volume of air exhaled by patients in one second. The most common side effects include: headache, injection site reactions, back pain, weakness, and fatigue; hypersensitivity reactions can occur within hours or days including swelling of the face, mouth, and tongue, fainting, dizziness, hives, breathing problems, and rash; herpes zoster infections have occurred. The drug is a monoclonal antibody that inhibits interleukin -5 which helps regular eosinophils, a type of white blood cell that contributes to asthma. The over-production of eosinophils can cause inflammation in the lungs, increasing the frequency of asthma attacks. Patients must also take other medications, including high dose inhaled corticosteroids and at least one additional asthma drug \par \par Problem 1A: Corticosteroid- Dependent Asthma\par - Currently on prednisone 2.5 mg QOD\par - Considering Dupixent- initiating paper work (not a candidate) \par - Information sheet given to the patient to be reviewed, this medication is never to be used without consulting the prescribing physician. Proper dietary restraint is necessary specifically salt containing foods, if any reaction may occur should be reported. \par \par Problem 1B: Eosinophilic Asthma (5%, 400)\par -Candidate for Biologics (Fasenra, Nucala, Dupixent) \par -on Nucala (monthly)\par -The safety and efficacy of Nucala was established in three double-blind, randomized, placebo-controlled trials in patients with severe asthma. Compared to a placebo, patients with severe asthma receiving Nucala had fewer exacerbation requiring hospitalization and/or emergency department visits, and a longer time to first exacerbation. In addition, patients with severe asthma receiving Nucala or Fasenra experienced greater reductions in their daily maintenance oral corticosteroid dose, while maintaining asthma control compared with patients receiving placebo. Treatment with Nucala did not result in a significant improvement in lung function, as measured by the volume of air exhaled by patients in one second. The most common side effects include: headache, injection site reactions, back pain, weakness, and fatigue; hypersensitivity reactions can occur within hours or days including swelling of the face, mouth, and tongue, fainting, dizziness, hives, breathing problems, and rash; herpes zoster infections have occurred. The drug is a monoclonal antibody that inhibits interleukin-5 which helps regular eosinophils, a type of white blood cell that contributes to asthma. The over-production of eosinophils can cause inflammation in the lungs, increasing the frequency of asthma attacks. Patients must also take other medications, including high dose inhaled corticosteroids and at least one additional asthma drug.\par \par Problem 2: no tracheomalacia present\par - s/p Dynamic CT scan (-)\par  \par Problem 3: Allergy/ PND\par - Continue Flonase 1 sniff/nostril BID\par Environmental measures for allergies were encouraged including mattress and pillow covers, air purifier and environmental controls.\par \par Problem 4: GERD\par - Continue Omeprazole 40 mg qAM before breakfast\par -continue Pepcid 40 mg QHS \par - Things to avoid including overeating, spicy foods, tight clothing, eating within three hours of bed, this list is not all inclusive. \par - For treatment of reflux, possible options discussed including diet control, H2 blockers, PPIs, as well as coating motility agents discussed as treatment options. Timing of meals and proximity of last meal to sleep were discussed. If symptoms persist, a formal gastrointestinal evaluation is needed. \par \par Problem 5: R/o OSAS\par - Due to his EDS, snoring, elevated Mallampati class, he is being recommended for a home sleep study to r/o the diagnosis of OSAS.\par - Sleep apnea is associated with adverse clinical consequences which an affect most organ systems. Cardiovascular disease risk includes arrhythmias, atrial fibrillation, hypertension, coronary artery disease, and stroke. Metabolic disorders include diabetes type 2, non-alcoholic fatty liver disease. Mood disorder especially depression; and cognitive decline especially in the elderly. Associations with chronic reflux/Batista’s esophagus some but not all inclusive. \par -Reasons include arousal consistent with hypopnea; respiratory events most prominent in REM sleep or supine position; therefore sleep staging and body position are important for accurate diagnosis and estimation of AHI. \par \par Problem 6: Abnormal CT most c/w Inflammation -(resolved)\par - s/p chest CT scan (last 11/2020)- clear; no follow up needed \par -images reviewed and discussed with patient in detail \par \par Problem 7: Health maintenance \par - S/p Covid 19 vaccine (Moderna) x3\par -Covid 19 vaccine discussed at length with patient and the patient was recommended to wait for the booster containing the delta variant \par -COVID-19 booster shot was discussed at length\par -s/p flu shot (2021)\par -recommended strep pneumonia vaccines: Prevnar-13 vaccine, followed by Pneumo vaccine 23 one year following\par -recommended early intervention for URIs\par -recommended regular osteoporosis evaluations\par -recommended early dermatological evaluations\par -recommended after the age of 50 to the age of 70, colonoscopy every 5 years\par \par \par f/u in 3 months with SPI and NiOx\par pt is encouraged to call or fax the office with any questions or concerns. \par Explained to the pt in full detail with demonstrations how to use the inhalers and inhaler hygiene. \par -Education provided to the PT regarding their visit and conditions.

## 2022-03-14 NOTE — PHYSICAL EXAM
[No Acute Distress] : no acute distress [Normal Oropharynx] : normal oropharynx [III] : Mallampati Class: III [Normal Appearance] : normal appearance [No Neck Mass] : no neck mass [Normal Rate/Rhythm] : normal rate/rhythm [Normal S1, S2] : normal s1, s2 [No Murmurs] : no murmurs [No Resp Distress] : no resp distress [Clear to Auscultation Bilaterally] : clear to auscultation bilaterally [No Abnormalities] : no abnormalities [Benign] : benign [Normal Gait] : normal gait [No Clubbing] : no clubbing [No Cyanosis] : no cyanosis [No Edema] : no edema [FROM] : FROM [Normal Color/ Pigmentation] : normal color/ pigmentation [No Focal Deficits] : no focal deficits [Oriented x3] : oriented x3 [Normal Affect] : normal affect [TextBox_68] : I:E 1:3; clear

## 2022-03-14 NOTE — REASON FOR VISIT
[Follow-Up] : a follow-up visit [Family Member] : family member [Active TB] : active TB [FreeTextEntry1] : severe persistent corticosteroid dependent Asthma, SOB, allergy

## 2022-04-13 ENCOUNTER — APPOINTMENT (OUTPATIENT)
Dept: PULMONOLOGY | Facility: CLINIC | Age: 72
End: 2022-04-13
Payer: MEDICAID

## 2022-04-13 PROCEDURE — 96372 THER/PROPH/DIAG INJ SC/IM: CPT

## 2022-04-13 RX ORDER — MEPOLIZUMAB 100 MG/ML
100 INJECTION, POWDER, FOR SOLUTION SUBCUTANEOUS
Qty: 0 | Refills: 0 | Status: COMPLETED | OUTPATIENT
Start: 2022-04-13

## 2022-04-25 NOTE — ED CDU PROVIDER SUBSEQUENT DAY NOTE - MEDICAL DECISION MAKING DETAILS
Body Location Override (Optional - Billing Will Still Be Based On Selected Body Map Location If Applicable): R & L parietal scalp Detail Level: Detailed Size Of Lesion: 5mm Morphology Per Location (Optional): pink papules Body Location Override (Optional - Billing Will Still Be Based On Selected Body Map Location If Applicable): R flank Size Of Lesion: 5x3.5mm Morphology Per Location (Optional): square-shaped brown macule Body Location Override (Optional - Billing Will Still Be Based On Selected Body Map Location If Applicable): R lower abdomen Size Of Lesion: 6x5mm Morphology Per Location (Optional): brown macule Body Location Override (Optional - Billing Will Still Be Based On Selected Body Map Location If Applicable): L paraspinal upper back Size Of Lesion: 7x5mm Morphology Per Location (Optional): pink papule, with a white scar in the central portion questionably recurrent nevus Body Location Override (Optional - Billing Will Still Be Based On Selected Body Map Location If Applicable): L scapular Dan[place]: 67yo M hx of reactive airway dz htn pw asthma exacerbaiton tx with nebs mag steroids in obs for 24hrs had ct chest non con - showed atelectasis --pt feels better sat 95% on ra but still wheezing sat 88% overnight--will need admission; pulmonary consult + for enterovirus Size Of Lesion: 4mm Morphology Per Location (Optional): triangular-shaped macule Body Location Override (Optional - Billing Will Still Be Based On Selected Body Map Location If Applicable): L lower back Size Of Lesion: 8x6mm Morphology Per Location (Optional): brown to medium brown macule Body Location Override (Optional - Billing Will Still Be Based On Selected Body Map Location If Applicable): R medial buttock Size Of Lesion: 6x4mm Morphology Per Location (Optional): brown macule, with skin-colored round follicle at 3 o’clock Body Location Override (Optional - Billing Will Still Be Based On Selected Body Map Location If Applicable): R lateral medial buttock Morphology Per Location (Optional): brown macule, with a darker brown center

## 2022-05-11 ENCOUNTER — APPOINTMENT (OUTPATIENT)
Dept: PULMONOLOGY | Facility: CLINIC | Age: 72
End: 2022-05-11
Payer: MEDICAID

## 2022-05-11 PROCEDURE — 96372 THER/PROPH/DIAG INJ SC/IM: CPT

## 2022-05-11 RX ORDER — MEPOLIZUMAB 100 MG/ML
100 INJECTION, POWDER, FOR SOLUTION SUBCUTANEOUS
Qty: 0 | Refills: 0 | Status: COMPLETED | OUTPATIENT
Start: 2022-05-11

## 2022-06-08 ENCOUNTER — APPOINTMENT (OUTPATIENT)
Dept: PULMONOLOGY | Facility: CLINIC | Age: 72
End: 2022-06-08
Payer: MEDICAID

## 2022-06-08 VITALS
BODY MASS INDEX: 24.16 KG/M2 | HEIGHT: 65 IN | WEIGHT: 145 LBS | SYSTOLIC BLOOD PRESSURE: 132 MMHG | OXYGEN SATURATION: 98 % | HEART RATE: 90 BPM | RESPIRATION RATE: 16 BRPM | TEMPERATURE: 98.1 F | DIASTOLIC BLOOD PRESSURE: 70 MMHG

## 2022-06-08 PROCEDURE — 96372 THER/PROPH/DIAG INJ SC/IM: CPT

## 2022-06-08 RX ORDER — MEPOLIZUMAB 100 MG/ML
100 INJECTION, POWDER, FOR SOLUTION SUBCUTANEOUS
Qty: 0 | Refills: 0 | Status: COMPLETED | OUTPATIENT
Start: 2022-06-08

## 2022-06-20 ENCOUNTER — APPOINTMENT (OUTPATIENT)
Dept: ENDOCRINOLOGY | Facility: CLINIC | Age: 72
End: 2022-06-20

## 2022-06-29 ENCOUNTER — APPOINTMENT (OUTPATIENT)
Dept: RADIOLOGY | Facility: CLINIC | Age: 72
End: 2022-06-29

## 2022-06-29 ENCOUNTER — OUTPATIENT (OUTPATIENT)
Dept: OUTPATIENT SERVICES | Facility: HOSPITAL | Age: 72
LOS: 1 days | End: 2022-06-29
Payer: MEDICAID

## 2022-06-29 DIAGNOSIS — M81.8 OTHER OSTEOPOROSIS WITHOUT CURRENT PATHOLOGICAL FRACTURE: ICD-10-CM

## 2022-06-29 DIAGNOSIS — Z00.8 ENCOUNTER FOR OTHER GENERAL EXAMINATION: ICD-10-CM

## 2022-06-29 DIAGNOSIS — Z00.00 ENCOUNTER FOR GENERAL ADULT MEDICAL EXAMINATION WITHOUT ABNORMAL FINDINGS: ICD-10-CM

## 2022-06-29 PROCEDURE — 77080 DXA BONE DENSITY AXIAL: CPT | Mod: 26

## 2022-06-29 PROCEDURE — 77080 DXA BONE DENSITY AXIAL: CPT

## 2022-07-06 ENCOUNTER — APPOINTMENT (OUTPATIENT)
Dept: PULMONOLOGY | Facility: CLINIC | Age: 72
End: 2022-07-06

## 2022-07-06 ENCOUNTER — NON-APPOINTMENT (OUTPATIENT)
Age: 72
End: 2022-07-06

## 2022-07-06 ENCOUNTER — APPOINTMENT (OUTPATIENT)
Dept: ENDOCRINOLOGY | Facility: CLINIC | Age: 72
End: 2022-07-06

## 2022-07-06 VITALS
BODY MASS INDEX: 24.66 KG/M2 | HEART RATE: 100 BPM | HEIGHT: 65 IN | OXYGEN SATURATION: 98 % | WEIGHT: 148 LBS | TEMPERATURE: 97.5 F | SYSTOLIC BLOOD PRESSURE: 130 MMHG | DIASTOLIC BLOOD PRESSURE: 60 MMHG | RESPIRATION RATE: 16 BRPM

## 2022-07-06 VITALS
WEIGHT: 148 LBS | HEIGHT: 66 IN | BODY MASS INDEX: 23.78 KG/M2 | DIASTOLIC BLOOD PRESSURE: 80 MMHG | OXYGEN SATURATION: 98 % | SYSTOLIC BLOOD PRESSURE: 126 MMHG | HEART RATE: 91 BPM | TEMPERATURE: 97.3 F

## 2022-07-06 DIAGNOSIS — E78.5 HYPERLIPIDEMIA, UNSPECIFIED: ICD-10-CM

## 2022-07-06 DIAGNOSIS — I10 ESSENTIAL (PRIMARY) HYPERTENSION: ICD-10-CM

## 2022-07-06 DIAGNOSIS — M85.80 OTHER SPECIFIED DISORDERS OF BONE DENSITY AND STRUCTURE, UNSPECIFIED SITE: ICD-10-CM

## 2022-07-06 DIAGNOSIS — E11.9 TYPE 2 DIABETES MELLITUS W/OUT COMPLICATIONS: ICD-10-CM

## 2022-07-06 PROCEDURE — 94010 BREATHING CAPACITY TEST: CPT

## 2022-07-06 PROCEDURE — 82962 GLUCOSE BLOOD TEST: CPT

## 2022-07-06 PROCEDURE — 83036 HEMOGLOBIN GLYCOSYLATED A1C: CPT | Mod: QW

## 2022-07-06 PROCEDURE — 99214 OFFICE O/P EST MOD 30 MIN: CPT | Mod: 25

## 2022-07-06 PROCEDURE — 96372 THER/PROPH/DIAG INJ SC/IM: CPT

## 2022-07-06 RX ORDER — ISOPROPYL ALCOHOL 70 ML/100ML
70 SWAB TOPICAL
Qty: 60 | Refills: 0 | Status: ACTIVE | COMMUNITY
Start: 2022-02-24

## 2022-07-06 RX ORDER — IBUPROFEN 600 MG/1
600 TABLET, FILM COATED ORAL
Qty: 16 | Refills: 0 | Status: ACTIVE | COMMUNITY
Start: 2022-06-20

## 2022-07-06 RX ORDER — MEPOLIZUMAB 100 MG/ML
100 INJECTION, POWDER, FOR SOLUTION SUBCUTANEOUS
Qty: 0 | Refills: 0 | Status: COMPLETED | OUTPATIENT
Start: 2022-07-06

## 2022-07-06 RX ADMIN — MEPOLIZUMAB 0 MG: 100 INJECTION, POWDER, FOR SOLUTION SUBCUTANEOUS at 00:00

## 2022-07-06 NOTE — PHYSICAL EXAM
[No Acute Distress] : no acute distress [Normal Oropharynx] : normal oropharynx [Normal Appearance] : normal appearance [No Neck Mass] : no neck mass [Normal Rate/Rhythm] : normal rate/rhythm [Normal S1, S2] : normal s1, s2 [No Murmurs] : no murmurs [No Resp Distress] : no resp distress [Clear to Auscultation Bilaterally] : clear to auscultation bilaterally [No Abnormalities] : no abnormalities [Benign] : benign [Normal Gait] : normal gait [No Clubbing] : no clubbing [No Cyanosis] : no cyanosis [No Edema] : no edema [FROM] : FROM [Normal Color/ Pigmentation] : normal color/ pigmentation [No Focal Deficits] : no focal deficits [Oriented x3] : oriented x3 [Normal Affect] : normal affect [II] : Mallampati Class: II [TextBox_68] : I:E 1:3; clear

## 2022-07-06 NOTE — ADDENDUM
[FreeTextEntry1] : Documented by Jony Robbins acting as a scribe for Dr. Antonio Reyes on 07/06/2022.\par \par All medical record entries made by the Scribe were at my, Dr. Antonio Reyes's, direction and personally dictated by me on 07/06/2022. I have reviewed the chart and agree that the record accurately reflects my personal performance of the history, physical exam, assessment and plan. I have also personally directed, reviewed, and agree with the discharge instructions.		 
You can access the FollowMyHealth Patient Portal offered by [facility] by registering at the following website: [email or web address]. By joining [facility] FollowMyHealth portal, you will also be able to view your health information using other applications (apps) compatible with our system.

## 2022-07-06 NOTE — PROCEDURE
[FreeTextEntry1] : Feno was unable to be performed; a normal value being less than 25. Fractional exhaled nitric oxide (FENO) is regarded as a simple, noninvasive method for assessing eosinophilic airway inflammation. Produced by a variety of cells within the lung, nitric oxide (NO) concentrations are generally low in healthy individuals. However, high concentrations of NO appear to be involved in nonspecific host defense mechanisms and chronic inflammatory  diseases such as asthma. The American Thoracic Society (ATS) therefore recommended using FENO to aid in the diagnosis and monitoring of eosinophilic airway inflammation and asthma, and for identifying steroid responsive individuals whose chronic respiratory symptoms may be caused by airway inflammation \par \par PFT revealed very mild restrictive dysfunction, with a FEV1 of 1.96L, which is 78% of predicted, with a normal flow volume loop

## 2022-07-06 NOTE — REASON FOR VISIT
[Active TB] : active TB [Follow-Up] : a follow-up visit [Family Member] : family member [FreeTextEntry1] : severe persistent corticosteroid dependent Asthma, SOB, allergy

## 2022-07-06 NOTE — ASSESSMENT
[FreeTextEntry1] : Mr. Chapman is a 72 year old male  with a prior history of   diabetes mellitus, asthma for 10 years/ eosinophilic asthma, though most prominent in the last year following PNA and bronchitis, abnormal CT scan of the chest who now comes in for a pulmonary follow up. His asthma is currently active - but overall better ([place] hiatus 1/23-4/30).  - active asthma 12/2021- now stable - off steroids\par \par Problem 1: Severe Persistent Asthma (eosinophilic asthma)  (quiet)\par -continue Nucala- did not receive in [place]  (in place since 10/2020) \par - Continue Duoneb via the nebulizer TID-QID \par -continue Breo Ellipta 200 at 1 inhalation QD\par -continue Spiriva respimat  2.5 qDay \par -continue Arnuity 200 \par - Continue Singulair 10 mg QHS \par -Add Aerobika - mucus clearance device \par - Asthma is believed to be caused by inherited (genetic) and environmental factor, but its exact cause is unknown. Asthma may be triggered by allergens, lung infections, or irritants in the air. Asthma triggers are different for each person. \par -Inhaler technique reviewed as well as oral hygiene techniques reviewed with patient. Avoidance of cold air, extremes of temperature, rescue inhaler should be used before exercise. Order of medication reviewed with patient. Recommended use of a cool mist humidifier in the bedroom. \par -The safety and efficacy of Nucala was established in three double-blind, randomized, placebo-controlled trials in patients with severe asthma. Compared to a placebo, patients with severe asthma receiving Nucala had fewer exacerbation requiring hospitalization and/or emergency department visits, and a longer time to first exacerbation. In addition, patients with severe asthma receiving Nucala or Fasenra experienced greater reductions in their daily maintenance oral corticosteroid dose, while maintaining asthma control compared with patients receiving placebo. Treatment with Nucala did not result in a significant improvement in lung function, as measured by the volume of air exhaled by patients in one second. The most common side effects include: headache, injection site reactions, back pain, weakness, and fatigue; hypersensitivity reactions can occur within hours or days including swelling of the face, mouth, and tongue, fainting, dizziness, hives, breathing problems, and rash; herpes zoster infections have occurred. The drug is a monoclonal antibody that inhibits interleukin -5 which helps regular eosinophils, a type of white blood cell that contributes to asthma. The over-production of eosinophils can cause inflammation in the lungs, increasing the frequency of asthma attacks. Patients must also take other medications, including high dose inhaled corticosteroids and at least one additional asthma drug \par \par Problem 1A: Corticosteroid- Dependent Asthma\par - s/p on prednisone 2.5 mg QOD (off since 3/2022)\par - Considering Dupixent- initiating paper work (not a candidate) \par - Information sheet given to the patient to be reviewed, this medication is never to be used without consulting the prescribing physician. Proper dietary restraint is necessary specifically salt containing foods, if any reaction may occur should be reported. \par \par Problem 1B: Eosinophilic Asthma (5%, 400)\par -Candidate for Biologics (Fasenra, Nucala, Dupixent) \par -on Nucala (monthly)\par -The safety and efficacy of Nucala was established in three double-blind, randomized, placebo-controlled trials in patients with severe asthma. Compared to a placebo, patients with severe asthma receiving Nucala had fewer exacerbation requiring hospitalization and/or emergency department visits, and a longer time to first exacerbation. In addition, patients with severe asthma receiving Nucala or Fasenra experienced greater reductions in their daily maintenance oral corticosteroid dose, while maintaining asthma control compared with patients receiving placebo. Treatment with Nucala did not result in a significant improvement in lung function, as measured by the volume of air exhaled by patients in one second. The most common side effects include: headache, injection site reactions, back pain, weakness, and fatigue; hypersensitivity reactions can occur within hours or days including swelling of the face, mouth, and tongue, fainting, dizziness, hives, breathing problems, and rash; herpes zoster infections have occurred. The drug is a monoclonal antibody that inhibits interleukin-5 which helps regular eosinophils, a type of white blood cell that contributes to asthma. The over-production of eosinophils can cause inflammation in the lungs, increasing the frequency of asthma attacks. Patients must also take other medications, including high dose inhaled corticosteroids and at least one additional asthma drug.\par \par Problem 2: no tracheomalacia present\par - s/p Dynamic CT scan (-)\par  \par Problem 3: Allergy/ PND\par - Continue Flonase 1 sniff/nostril BID\par Environmental measures for allergies were encouraged including mattress and pillow covers, air purifier and environmental controls.\par \par Problem 4: GERD\par - Continue Omeprazole 40 mg qAM before breakfast\par -continue Pepcid 40 mg QHS \par - Things to avoid including overeating, spicy foods, tight clothing, eating within three hours of bed, this list is not all inclusive. \par - For treatment of reflux, possible options discussed including diet control, H2 blockers, PPIs, as well as coating motility agents discussed as treatment options. Timing of meals and proximity of last meal to sleep were discussed. If symptoms persist, a formal gastrointestinal evaluation is needed. \par \par Problem 5: R/o OSAS\par - Due to his EDS, snoring, elevated Mallampati class, he is being recommended for a home sleep study to r/o the diagnosis of OSAS.\par - Sleep apnea is associated with adverse clinical consequences which an affect most organ systems. Cardiovascular disease risk includes arrhythmias, atrial fibrillation, hypertension, coronary artery disease, and stroke. Metabolic disorders include diabetes type 2, non-alcoholic fatty liver disease. Mood disorder especially depression; and cognitive decline especially in the elderly. Associations with chronic reflux/Batista’s esophagus some but not all inclusive. \par -Reasons include arousal consistent with hypopnea; respiratory events most prominent in REM sleep or supine position; therefore sleep staging and body position are important for accurate diagnosis and estimation of AHI. \par \par Problem 6: Abnormal CT most c/w Inflammation -(resolved)\par - s/p chest CT scan (last 11/2020)- clear; no follow up needed \par -images reviewed and discussed with patient in detail \par \par Problem 7: Health maintenance \par - S/p Covid 19 vaccine (Moderna) x3\par -Covid 19 vaccine discussed at length with patient and the patient was recommended to wait for the booster containing the delta variant \par -COVID-19 booster shot was discussed at length\par -s/p flu shot (2021)\par -recommended strep pneumonia vaccines: Prevnar-13 vaccine, followed by Pneumo vaccine 23 one year following\par -recommended early intervention for URIs\par -recommended regular osteoporosis evaluations\par -recommended early dermatological evaluations\par -recommended after the age of 50 to the age of 70, colonoscopy every 5 years\par \par \par f/u in 3 months with SPI and NiOx\par pt is encouraged to call or fax the office with any questions or concerns. \par Explained to the pt in full detail with demonstrations how to use the inhalers and inhaler hygiene. \par -Education provided to the PT regarding their visit and conditions.

## 2022-07-06 NOTE — HISTORY OF PRESENT ILLNESS
[FreeTextEntry1] : Mr. Chapman is a 72 year old male with a history of severe persistent corticosteroid dependent Asthma, SOB, allergy presenting to the office today for a follow up visit. His chief complaint is \par -he notes feeling okay\par -he notes exercising  (gardening)\par -he notes feeling fit\par -he notes knee pains at times\par -he notes that he has stopped prednisone\par -s/p root canal\par -he notes some leg and back pain at times\par \par -patient denies any headaches, nausea, vomiting, fever, chills, sweats, chest pain, chest pressure, palpitations, coughing, wheezing, fatigue, diarrhea, constipation, dysphagia, myalgias, dizziness, leg swelling, leg pain, itchy eyes, itchy ears, heartburn, reflux or sour taste in the mouth

## 2022-07-07 LAB
GLUCOSE BLDC GLUCOMTR-MCNC: 138
HBA1C MFR BLD HPLC: 7.1

## 2022-08-03 ENCOUNTER — APPOINTMENT (OUTPATIENT)
Dept: PULMONOLOGY | Facility: CLINIC | Age: 72
End: 2022-08-03

## 2022-08-03 VITALS
HEIGHT: 66 IN | RESPIRATION RATE: 16 BRPM | WEIGHT: 146 LBS | HEART RATE: 87 BPM | DIASTOLIC BLOOD PRESSURE: 70 MMHG | TEMPERATURE: 97.2 F | BODY MASS INDEX: 23.46 KG/M2 | SYSTOLIC BLOOD PRESSURE: 132 MMHG | OXYGEN SATURATION: 98 %

## 2022-08-03 PROCEDURE — 96372 THER/PROPH/DIAG INJ SC/IM: CPT

## 2022-08-03 RX ORDER — MEPOLIZUMAB 100 MG/ML
100 INJECTION, POWDER, FOR SOLUTION SUBCUTANEOUS
Qty: 0 | Refills: 0 | Status: COMPLETED | OUTPATIENT
Start: 2022-08-03

## 2022-08-03 RX ADMIN — MEPOLIZUMAB 1 MG: 100 INJECTION, POWDER, FOR SOLUTION SUBCUTANEOUS at 00:00

## 2022-08-15 ENCOUNTER — NON-APPOINTMENT (OUTPATIENT)
Age: 72
End: 2022-08-15

## 2022-08-15 ENCOUNTER — TRANSCRIPTION ENCOUNTER (OUTPATIENT)
Age: 72
End: 2022-08-15

## 2022-08-18 ENCOUNTER — NON-APPOINTMENT (OUTPATIENT)
Age: 72
End: 2022-08-18

## 2022-08-26 ENCOUNTER — TRANSCRIPTION ENCOUNTER (OUTPATIENT)
Age: 72
End: 2022-08-26

## 2022-08-29 ENCOUNTER — APPOINTMENT (OUTPATIENT)
Dept: PULMONOLOGY | Facility: CLINIC | Age: 72
End: 2022-08-29

## 2022-08-29 DIAGNOSIS — Z11.59 ENCOUNTER FOR SCREENING FOR OTHER VIRAL DISEASES: ICD-10-CM

## 2022-08-29 LAB
RAPID RVP RESULT: DETECTED
SARS-COV-2 RNA PNL RESP NAA+PROBE: DETECTED

## 2022-08-29 PROCEDURE — 99214 OFFICE O/P EST MOD 30 MIN: CPT | Mod: 95

## 2022-08-29 RX ORDER — PROMETHAZINE HYDROCHLORIDE AND DEXTROMETHORPHAN HYDROBROMIDE ORAL SOLUTION 15; 6.25 MG/5ML; MG/5ML
6.25-15 SOLUTION ORAL
Qty: 473 | Refills: 1 | Status: ACTIVE | COMMUNITY
Start: 2022-08-29 | End: 1900-01-01

## 2022-08-29 NOTE — ADDENDUM
[FreeTextEntry1] : Documented by Cris Dixon acting as a scribe for Dr. Antonio Reyes on 08/29/2022 \par \par All medical record entries made by the Scribe were at my, Dr. Antonio Reyes's, direction and personally dictated by me on 08/29/2022 . I have reviewed the chart and agree that the record accurately reflects my personal performance of the history, physical exam, assessment and plan. I have also personally directed, reviewed, and agree with the discharge instructions.

## 2022-08-29 NOTE — ASSESSMENT
[FreeTextEntry1] : Mr. Chapman is a 72 year old male  with a prior history of   diabetes mellitus, asthma for 10 years/ eosinophilic asthma, though most prominent in the last year following PNA and bronchitis, abnormal CT scan of the chest who now comes in via video call  for a pulmonary follow up. His asthma is currently active - but overall better ([place] hiatus 1/23-4/30).  - active asthma 12/2021- ; COVID-19 (+) but symptomatic for 3 weeks \par \par Problem 1: Severe Persistent Asthma (eosinophilic asthma)  (Active due to COVID) \par -continue Nucala- did not receive in [place]  (in place since 10/2020) \par - Continue Duoneb via the nebulizer TID-QID \par -continue Breo Ellipta 200 at 1 inhalation QD\par -continue Spiriva respimat  2.5 qDay \par - Continue Singulair 10 mg QHS \par -Add Aerobika - mucus clearance device \par -add Prednisone 20 mg x 7 days, 10 mg x 7 days \par Information sheet given to the patient to be reviewed, this medication is never to be used without consulting the prescribing physician. Proper dietary restraint is necessary specifically salt containing foods, if any reaction may occur should be reported. \par - Asthma is believed to be caused by inherited (genetic) and environmental factor, but its exact cause is unknown. Asthma may be triggered by allergens, lung infections, or irritants in the air. Asthma triggers are different for each person. \par -Inhaler technique reviewed as well as oral hygiene techniques reviewed with patient. Avoidance of cold air, extremes of temperature, rescue inhaler should be used before exercise. Order of medication reviewed with patient. Recommended use of a cool mist humidifier in the bedroom. \par -The safety and efficacy of Nucala was established in three double-blind, randomized, placebo-controlled trials in patients with severe asthma. Compared to a placebo, patients with severe asthma receiving Nucala had fewer exacerbation requiring hospitalization and/or emergency department visits, and a longer time to first exacerbation. In addition, patients with severe asthma receiving Nucala or Fasenra experienced greater reductions in their daily maintenance oral corticosteroid dose, while maintaining asthma control compared with patients receiving placebo. Treatment with Nucala did not result in a significant improvement in lung function, as measured by the volume of air exhaled by patients in one second. The most common side effects include: headache, injection site reactions, back pain, weakness, and fatigue; hypersensitivity reactions can occur within hours or days including swelling of the face, mouth, and tongue, fainting, dizziness, hives, breathing problems, and rash; herpes zoster infections have occurred. The drug is a monoclonal antibody that inhibits interleukin -5 which helps regular eosinophils, a type of white blood cell that contributes to asthma. The over-production of eosinophils can cause inflammation in the lungs, increasing the frequency of asthma attacks. Patients must also take other medications, including high dose inhaled corticosteroids and at least one additional asthma drug \par \par Problem 1A: Corticosteroid- Dependent Asthma\par - s/p on prednisone 2.5 mg QOD (off since 3/2022)\par - Considering Dupixent- initiating paper work (not a candidate) \par - Information sheet given to the patient to be reviewed, this medication is never to be used without consulting the prescribing physician. Proper dietary restraint is necessary specifically salt containing foods, if any reaction may occur should be reported. \par \par Problem 1B: Eosinophilic Asthma (5%, 400)\par -Candidate for Biologics (Fasenra, Nucala, Dupixent) \par -on Nucala (monthly)\par -The safety and efficacy of Nucala was established in three double-blind, randomized, placebo-controlled trials in patients with severe asthma. Compared to a placebo, patients with severe asthma receiving Nucala had fewer exacerbation requiring hospitalization and/or emergency department visits, and a longer time to first exacerbation. In addition, patients with severe asthma receiving Nucala or Fasenra experienced greater reductions in their daily maintenance oral corticosteroid dose, while maintaining asthma control compared with patients receiving placebo. Treatment with Nucala did not result in a significant improvement in lung function, as measured by the volume of air exhaled by patients in one second. The most common side effects include: headache, injection site reactions, back pain, weakness, and fatigue; hypersensitivity reactions can occur within hours or days including swelling of the face, mouth, and tongue, fainting, dizziness, hives, breathing problems, and rash; herpes zoster infections have occurred. The drug is a monoclonal antibody that inhibits interleukin-5 which helps regular eosinophils, a type of white blood cell that contributes to asthma. The over-production of eosinophils can cause inflammation in the lungs, increasing the frequency of asthma attacks. Patients must also take other medications, including high dose inhaled corticosteroids and at least one additional asthma drug.\par \par Problem 2: no tracheomalacia present\par - s/p Dynamic CT scan (-)\par  \par Problem 3: Allergy/ PND\par - Continue Flonase 1 sniff/nostril BID\par Environmental measures for allergies were encouraged including mattress and pillow covers, air purifier and environmental controls.\par \par Problem 4: GERD\par - Continue Omeprazole 40 mg qAM before breakfast\par -continue Pepcid 40 mg QHS \par - Things to avoid including overeating, spicy foods, tight clothing, eating within three hours of bed, this list is not all inclusive. \par - For treatment of reflux, possible options discussed including diet control, H2 blockers, PPIs, as well as coating motility agents discussed as treatment options. Timing of meals and proximity of last meal to sleep were discussed. If symptoms persist, a formal gastrointestinal evaluation is needed. \par \par Problem 5: R/o OSAS\par - Due to his EDS, snoring, elevated Mallampati class, he is being recommended for a home sleep study to r/o the diagnosis of OSAS.\par - Sleep apnea is associated with adverse clinical consequences which an affect most organ systems. Cardiovascular disease risk includes arrhythmias, atrial fibrillation, hypertension, coronary artery disease, and stroke. Metabolic disorders include diabetes type 2, non-alcoholic fatty liver disease. Mood disorder especially depression; and cognitive decline especially in the elderly. Associations with chronic reflux/Batista’s esophagus some but not all inclusive. \par -Reasons include arousal consistent with hypopnea; respiratory events most prominent in REM sleep or supine position; therefore sleep staging and body position are important for accurate diagnosis and estimation of AHI. \par \par Problem 6: Abnormal CT most c/w Inflammation -(resolved)\par - s/p chest CT scan (last 11/2020)- clear; no follow up needed \par -images reviewed and discussed with patient in detail \par \par Problem 7: Health maintenance \par - S/p Covid 19 vaccine (Moderna) x3, s/p COVID-19 8/2022 \par -Covid 19 vaccine discussed at length with patient and the patient was recommended to wait for the booster containing the delta variant \par -COVID-19 booster shot was discussed at length\par -s/p flu shot (2021)\par -recommended strep pneumonia vaccines: Prevnar-13 vaccine, followed by Pneumo vaccine 23 one year following\par -recommended early intervention for URIs\par -recommended regular osteoporosis evaluations\par -recommended early dermatological evaluations\par -recommended after the age of 50 to the age of 70, colonoscopy every 5 years\par \par \par f/u in 3 months with SPI and NiOx\par pt is encouraged to call or fax the office with any questions or concerns. \par Explained to the pt in full detail with demonstrations how to use the inhalers and inhaler hygiene. \par -Education provided to the PT regarding their visit and conditions.

## 2022-08-29 NOTE — HISTORY OF PRESENT ILLNESS
[Home] : at home, [unfilled] , at the time of the visit. [Medical Office: ([facility])___] : at the medical office located in  [Verbal consent obtained from patient] : the patient, [unfilled] [FreeTextEntry1] : Mr. Chapman is a 72 year old male with a history of severe persistent corticosteroid dependent Asthma, SOB, allergy presenting to the office today via video call  for a follow up visit. His chief complaint is \par - he notes he got sick \par - he notes he has been coughing and his symptoms started 3 weeks ago \par - he notes he just tested positive for COVID \par - he notes its difficult for his to avoid coughing \par - he notes his mucus has been white \par - no heart burn or reflux \par - he notes his bowels have been regular \par - he notes no one else has COVID at home right now \par - he notes he has been taking his nebulizer \par - \par -He denies any visual issues, headaches, nausea, vomiting, fever, chills, sweats, chest pains, chest pressure, diarrhea, constipation, dysphagia, myalgia, dizziness, leg swelling, leg pain, itchy eyes, itchy ears.

## 2022-08-29 NOTE — REASON FOR VISIT
[Active TB] : active TB [Follow-Up] : a follow-up visit [FreeTextEntry1] : via video call - severe persistent corticosteroid dependent Asthma, SOB, allergy

## 2022-08-31 ENCOUNTER — APPOINTMENT (OUTPATIENT)
Dept: PULMONOLOGY | Facility: CLINIC | Age: 72
End: 2022-08-31

## 2022-08-31 VITALS
DIASTOLIC BLOOD PRESSURE: 60 MMHG | BODY MASS INDEX: 22.5 KG/M2 | HEART RATE: 97 BPM | HEIGHT: 66 IN | RESPIRATION RATE: 16 BRPM | SYSTOLIC BLOOD PRESSURE: 124 MMHG | WEIGHT: 140 LBS | OXYGEN SATURATION: 98 % | TEMPERATURE: 98.2 F

## 2022-08-31 PROCEDURE — 96372 THER/PROPH/DIAG INJ SC/IM: CPT

## 2022-08-31 RX ORDER — MEPOLIZUMAB 100 MG/ML
100 INJECTION, POWDER, FOR SOLUTION SUBCUTANEOUS
Qty: 0 | Refills: 0 | Status: COMPLETED | OUTPATIENT
Start: 2022-08-30

## 2022-09-19 ENCOUNTER — NON-APPOINTMENT (OUTPATIENT)
Age: 72
End: 2022-09-19

## 2022-09-19 ENCOUNTER — APPOINTMENT (OUTPATIENT)
Dept: PULMONOLOGY | Facility: CLINIC | Age: 72
End: 2022-09-19

## 2022-09-19 VITALS
WEIGHT: 142.2 LBS | BODY MASS INDEX: 22.85 KG/M2 | RESPIRATION RATE: 16 BRPM | DIASTOLIC BLOOD PRESSURE: 62 MMHG | SYSTOLIC BLOOD PRESSURE: 130 MMHG | HEIGHT: 66 IN

## 2022-09-19 DIAGNOSIS — J20.9 ACUTE BRONCHITIS, UNSPECIFIED: ICD-10-CM

## 2022-09-19 DIAGNOSIS — J45.901 ACUTE BRONCHITIS, UNSPECIFIED: ICD-10-CM

## 2022-09-19 PROCEDURE — 99214 OFFICE O/P EST MOD 30 MIN: CPT | Mod: 25

## 2022-09-19 RX ORDER — CLARITHROMYCIN 500 MG/1
500 TABLET, FILM COATED ORAL
Qty: 20 | Refills: 0 | Status: ACTIVE | COMMUNITY
Start: 2022-09-19 | End: 1900-01-01

## 2022-09-19 NOTE — ASSESSMENT
[FreeTextEntry1] : Mr. Chapman is a 72 year old male  with a prior history of   diabetes mellitus, asthma for 10 years/ eosinophilic asthma, though most prominent in the last year following PNA and bronchitis, abnormal CT scan of the chest ([place] hiatus 1/23-4/30).  - active asthma 12/2021- ; COVID-19 (+) but symptomatic for 1 week - severe asthma flair \par \par Problem 1: Severe Persistent Asthma (eosinophilic asthma)  (Active due to COVID) \par -continue Nucala- did not receive in [place]  (in place since 10/2020) \par - Continue Duoneb via the nebulizer TID-QID \par -continue Breo Ellipta 200 at 1 inhalation QD\par -continue Spiriva respimat  2.5 qDay \par - Continue Singulair 10 mg QHS \par -Add Aerobika - mucus clearance device \par -add Prednisone 30 mg x 5 days, 20 mg x 5 days, 10 mg x 5 days \par Information sheet given to the patient to be reviewed, this medication is never to be used without consulting the prescribing physician. Proper dietary restraint is necessary specifically salt containing foods, if any reaction may occur should be reported. \par - Asthma is believed to be caused by inherited (genetic) and environmental factor, but its exact cause is unknown. Asthma may be triggered by allergens, lung infections, or irritants in the air. Asthma triggers are different for each person. \par -Inhaler technique reviewed as well as oral hygiene techniques reviewed with patient. Avoidance of cold air, extremes of temperature, rescue inhaler should be used before exercise. Order of medication reviewed with patient. Recommended use of a cool mist humidifier in the bedroom. \par -The safety and efficacy of Nucala was established in three double-blind, randomized, placebo-controlled trials in patients with severe asthma. Compared to a placebo, patients with severe asthma receiving Nucala had fewer exacerbation requiring hospitalization and/or emergency department visits, and a longer time to first exacerbation. In addition, patients with severe asthma receiving Nucala or Fasenra experienced greater reductions in their daily maintenance oral corticosteroid dose, while maintaining asthma control compared with patients receiving placebo. Treatment with Nucala did not result in a significant improvement in lung function, as measured by the volume of air exhaled by patients in one second. The most common side effects include: headache, injection site reactions, back pain, weakness, and fatigue; hypersensitivity reactions can occur within hours or days including swelling of the face, mouth, and tongue, fainting, dizziness, hives, breathing problems, and rash; herpes zoster infections have occurred. The drug is a monoclonal antibody that inhibits interleukin -5 which helps regular eosinophils, a type of white blood cell that contributes to asthma. The over-production of eosinophils can cause inflammation in the lungs, increasing the frequency of asthma attacks. Patients must also take other medications, including high dose inhaled corticosteroids and at least one additional asthma drug \par \par Problem 1A: Acute Bronchitis \par -Biaxin 500 BID (20) \par \par Problem 1A: Corticosteroid- Dependent Asthma\par - s/p on prednisone 2.5 mg QOD (off since 3/2022)\par - Considering Dupixent- initiating paper work (not a candidate) \par - Information sheet given to the patient to be reviewed, this medication is never to be used without consulting the prescribing physician. Proper dietary restraint is necessary specifically salt containing foods, if any reaction may occur should be reported. \par \par Problem 1B: Eosinophilic Asthma (5%, 400)\par -Candidate for Biologics (Fasenra, Nucala, Dupixent) \par -on Nucala (monthly)\par -The safety and efficacy of Nucala was established in three double-blind, randomized, placebo-controlled trials in patients with severe asthma. Compared to a placebo, patients with severe asthma receiving Nucala had fewer exacerbation requiring hospitalization and/or emergency department visits, and a longer time to first exacerbation. In addition, patients with severe asthma receiving Nucala or Fasenra experienced greater reductions in their daily maintenance oral corticosteroid dose, while maintaining asthma control compared with patients receiving placebo. Treatment with Nucala did not result in a significant improvement in lung function, as measured by the volume of air exhaled by patients in one second. The most common side effects include: headache, injection site reactions, back pain, weakness, and fatigue; hypersensitivity reactions can occur within hours or days including swelling of the face, mouth, and tongue, fainting, dizziness, hives, breathing problems, and rash; herpes zoster infections have occurred. The drug is a monoclonal antibody that inhibits interleukin-5 which helps regular eosinophils, a type of white blood cell that contributes to asthma. The over-production of eosinophils can cause inflammation in the lungs, increasing the frequency of asthma attacks. Patients must also take other medications, including high dose inhaled corticosteroids and at least one additional asthma drug.\par \par Problem 2: no tracheomalacia present\par - s/p Dynamic CT scan (-)\par  \par Problem 3: Allergy/ PND\par - Continue Flonase 1 sniff/nostril BID\par Environmental measures for allergies were encouraged including mattress and pillow covers, air purifier and environmental controls.\par \par Problem 4: GERD\par - Continue Omeprazole 40 mg qAM before breakfast\par -continue Pepcid 40 mg QHS \par - Things to avoid including overeating, spicy foods, tight clothing, eating within three hours of bed, this list is not all inclusive. \par - For treatment of reflux, possible options discussed including diet control, H2 blockers, PPIs, as well as coating motility agents discussed as treatment options. Timing of meals and proximity of last meal to sleep were discussed. If symptoms persist, a formal gastrointestinal evaluation is needed. \par \par Problem 5: R/o OSAS\par - Due to his EDS, snoring, elevated Mallampati class, he is being recommended for a home sleep study to r/o the diagnosis of OSAS.\par - Sleep apnea is associated with adverse clinical consequences which an affect most organ systems. Cardiovascular disease risk includes arrhythmias, atrial fibrillation, hypertension, coronary artery disease, and stroke. Metabolic disorders include diabetes type 2, non-alcoholic fatty liver disease. Mood disorder especially depression; and cognitive decline especially in the elderly. Associations with chronic reflux/Batista’s esophagus some but not all inclusive. \par -Reasons include arousal consistent with hypopnea; respiratory events most prominent in REM sleep or supine position; therefore sleep staging and body position are important for accurate diagnosis and estimation of AHI. \par \par Problem 6: Abnormal CT most c/w Inflammation -(resolved)\par - s/p chest CT scan (last 11/2020)- clear; no follow up needed \par -images reviewed and discussed with patient in detail \par \par Problem 7: Health maintenance \par - S/p Covid 19 vaccine (Moderna) x3, s/p COVID-19 8/2022 \par -Covid 19 vaccine discussed at length with patient and the patient was recommended to wait for the booster containing the delta variant \par -COVID-19 booster shot was discussed at length\par -s/p flu shot (2021)\par -recommended strep pneumonia vaccines: Prevnar-13 vaccine, followed by Pneumo vaccine 23 one year following\par -recommended early intervention for URIs\par -recommended regular osteoporosis evaluations\par -recommended early dermatological evaluations\par -recommended after the age of 50 to the age of 70, colonoscopy every 5 years\par \par \par f/u in 3 months with SPI and NiOx\par pt is encouraged to call or fax the office with any questions or concerns. \par Explained to the pt in full detail with demonstrations how to use the inhalers and inhaler hygiene. \par -Education provided to the PT regarding their visit and conditions.

## 2022-09-19 NOTE — ADDENDUM
[FreeTextEntry1] : Documented by Gayathri Danielle acting as a scribe for Dr. Antonio Reyes on 09/19/2022 .\par \par All medical record entries made by the Scribe were at my, Dr. Antonio Reyes's, direction and personally dictated by me on. I have reviewed the chart and agree that the record accurately reflects my personal performance of the history, physical exam, assessment and plan. I have also personally directed, reviewed, and agree with the discharge instructions.

## 2022-09-19 NOTE — HISTORY OF PRESENT ILLNESS
[FreeTextEntry1] : Mr. Chapman is a 72 year old male with a history of severe persistent corticosteroid dependent Asthma, SOB, allergy presenting to the office today for a follow up visit. His chief complaint is \par -he notes initirally feeling well after his infection \par -he notes  having covid not too long ago \par -he notes having moments of coughing bouts\par -pt states normal bowel movements \par -he notes headaches when he coughs uncontrollably \par -he notes getting the shots \par patient denies any headaches, nausea, vomiting, fever, chills, sweats, chest pain, chest pressure, palpitations, coughing, wheezing, fatigue, diarrhea, constipation, dysphagia, myalgias, dizziness, leg swelling, leg pain, itchy eyes, itchy ears, heartburn, reflux or sour taste in the mouth

## 2022-09-19 NOTE — REASON FOR VISIT
[Follow-Up] : a follow-up visit [FreeTextEntry1] :  severe persistent corticosteroid dependent Asthma, SOB, allergy

## 2022-09-19 NOTE — PHYSICAL EXAM
[No Acute Distress] : no acute distress [No Deformities] : no deformities [III] : Mallampati Class: III [TextBox_68] : I:E ratio 1:3; expiratory wheezes in the rhonchi bilaterally

## 2022-09-19 NOTE — PROCEDURE
[FreeTextEntry1] : Chest xray was unable to be performed because of [employer] program failure \par \par PFT - spi reveals mild restrictive; FEV1 is 1.28 which is 57% of predicted, normal flow volume loop

## 2022-09-27 ENCOUNTER — TRANSCRIPTION ENCOUNTER (OUTPATIENT)
Age: 72
End: 2022-09-27

## 2022-09-28 ENCOUNTER — APPOINTMENT (OUTPATIENT)
Dept: PULMONOLOGY | Facility: CLINIC | Age: 72
End: 2022-09-28

## 2022-09-28 ENCOUNTER — TRANSCRIPTION ENCOUNTER (OUTPATIENT)
Age: 72
End: 2022-09-28

## 2022-09-28 VITALS
HEART RATE: 101 BPM | DIASTOLIC BLOOD PRESSURE: 62 MMHG | WEIGHT: 143.3 LBS | RESPIRATION RATE: 16 BRPM | HEIGHT: 66 IN | SYSTOLIC BLOOD PRESSURE: 152 MMHG | BODY MASS INDEX: 23.03 KG/M2 | TEMPERATURE: 97.3 F | OXYGEN SATURATION: 97 %

## 2022-09-28 PROCEDURE — 96372 THER/PROPH/DIAG INJ SC/IM: CPT

## 2022-09-28 PROCEDURE — 99214 OFFICE O/P EST MOD 30 MIN: CPT | Mod: 25

## 2022-09-28 RX ORDER — MEPOLIZUMAB 100 MG/ML
100 INJECTION, POWDER, FOR SOLUTION SUBCUTANEOUS
Qty: 0 | Refills: 0 | Status: COMPLETED | OUTPATIENT
Start: 2022-09-28

## 2022-09-28 NOTE — HISTORY OF PRESENT ILLNESS
[TextBox_4] : Mr. Chapman is a 72 year old male with a prior history of diabetes mellitus, covid-19, asthma for 10 years/ eosinophilic asthma, though most prominent in the last year following PNA and bronchitis, abnormal CT scan of the chest. He presents for a follow up visit and to receive Nucala. \par \par His chief concern is having his lungs listened to.\par \par 9/19/22 Visit with Dr. Reyes:\par -he notes initirally feeling well after his infection \par -he notes having covid not too long ago \par -he notes having moments of coughing bouts\par -pt states normal bowel movements \par -he notes headaches when he coughs uncontrollably \par -he notes getting the shots \par patient denies any headaches, nausea, vomiting, fever, chills, sweats, chest pain, chest pressure, palpitations, coughing, wheezing, fatigue, diarrhea, constipation, dysphagia, myalgias, dizziness, leg swelling, leg pain, itchy eyes, itchy ears, heartburn, reflux or sour taste in the mouth \par \par UPDATE 9/28/22: \par \par Patient states he feels 80% improved from last week's visit. \par Patient states his last day of Biaxin is today.\par He notes he completed the 40 mg of Prednisone and is currently on day 4 of 30 mg Prednisone today. Patient notes nebulizer makes him cough more. He states he can still hear himself wheezing at times. Showed patient picture of inhalers and he seemed unsure of exact regimen/names of current inhalers. \par \par patient denies any headaches, nausea, vomiting, fever, chills, sweats, chest pain, chest pressure, palpitations, coughing, wheezing, fatigue, diarrhea, constipation, dysphagia, myalgias, dizziness, leg swelling, leg pain, itchy eyes, itchy ears, heartburn, reflux or sour taste in the mouth. \par

## 2022-09-28 NOTE — ASSESSMENT
[FreeTextEntry1] : Mr. Chapman is a 72 year old male with a prior history of diabetes mellitus, covid-19, asthma for 10 years/ eosinophilic asthma, though most prominent in the last year following PNA and bronchitis, abnormal CT scan of the chest. He presents for a follow up visit and to receive Nucala. His chief concern is having his lungs listened to.\par \par 1. Eosinophilic/Asthma Exacerbation:\par - Patient received Nucala injection Sub-Q in RUE today. Patient tolerated well - no signs or symptoms of reaction. \par - Take Prednisone 30 mg for a full 7 days (Thur/Fri/Sat) then decrease to 20 mg x 5 days then 10 mg x 5 days.\par - Increase Albuterol via neb to at least TID.\par - Increase use of Spiriva from 1 Puff PRN to 2 puffs Q AM.\par - Continue Breo inhaler 1 inhale Q AM  (or other ICS/LABA)  - rinse and gargle post use. \par - Wrote this plan of care on pink paper for patient to take home. \par \par 2. Cough:\par - r/t to Asthma exacerbation.\par - s/p Biaxin x 10 days.\par - if persistent will RX CXR.\par \par Patient to follow up with Dr. Reyes as scheduled.\par Patient to call with further questions and concerns.\par Patient verbalizes understanding of care plan and is agreeable.\par

## 2022-09-28 NOTE — PROCEDURE
[FreeTextEntry1] : Patient received Nucala injection Sub-Q in [place]UE today. Patient tolerated well - no signs or symptoms of reaction.

## 2022-09-28 NOTE — PHYSICAL EXAM
[No Acute Distress] : no acute distress [Normal Oropharynx] : normal oropharynx [Normal Appearance] : normal appearance [No Neck Mass] : no neck mass [Normal Rate/Rhythm] : normal rate/rhythm [Normal S1, S2] : normal s1, s2 [No Murmurs] : no murmurs [No Resp Distress] : no resp distress [Rhonchi] : rhonchi [No Abnormalities] : no abnormalities [Benign] : benign [Normal Gait] : normal gait [No Clubbing] : no clubbing [No Cyanosis] : no cyanosis [No Edema] : no edema [FROM] : FROM [Normal Color/ Pigmentation] : normal color/ pigmentation [No Focal Deficits] : no focal deficits [Oriented x3] : oriented x3 [Normal Affect] : normal affect [TextBox_68] : Bilateral, diffuse expiratory rhonchi auscultated.

## 2022-09-28 NOTE — REVIEW OF SYSTEMS
[Cough] : cough [Chest Tightness] : no chest tightness [Sputum] : no sputum [Dyspnea] : no dyspnea [Wheezing] : wheezing [SOB on Exertion] : no sob on exertion [Negative] : Endocrine

## 2022-10-03 ENCOUNTER — NON-APPOINTMENT (OUTPATIENT)
Age: 72
End: 2022-10-03

## 2022-10-03 ENCOUNTER — APPOINTMENT (OUTPATIENT)
Dept: OPHTHALMOLOGY | Facility: CLINIC | Age: 72
End: 2022-10-03

## 2022-10-03 PROCEDURE — 92014 COMPRE OPH EXAM EST PT 1/>: CPT | Mod: 25

## 2022-10-03 PROCEDURE — 68761 CLOSE TEAR DUCT OPENING: CPT | Mod: E2,E4

## 2022-10-10 ENCOUNTER — TRANSCRIPTION ENCOUNTER (OUTPATIENT)
Age: 72
End: 2022-10-10

## 2022-10-18 ENCOUNTER — TRANSCRIPTION ENCOUNTER (OUTPATIENT)
Age: 72
End: 2022-10-18

## 2022-10-21 ENCOUNTER — NON-APPOINTMENT (OUTPATIENT)
Age: 72
End: 2022-10-21

## 2022-10-21 ENCOUNTER — TRANSCRIPTION ENCOUNTER (OUTPATIENT)
Age: 72
End: 2022-10-21

## 2022-10-26 ENCOUNTER — APPOINTMENT (OUTPATIENT)
Dept: PULMONOLOGY | Facility: CLINIC | Age: 72
End: 2022-10-26

## 2022-10-26 VITALS
BODY MASS INDEX: 22.18 KG/M2 | HEART RATE: 101 BPM | OXYGEN SATURATION: 98 % | HEIGHT: 66 IN | RESPIRATION RATE: 16 BRPM | DIASTOLIC BLOOD PRESSURE: 80 MMHG | WEIGHT: 138 LBS | SYSTOLIC BLOOD PRESSURE: 122 MMHG | TEMPERATURE: 98.3 F

## 2022-10-26 PROCEDURE — 90662 IIV NO PRSV INCREASED AG IM: CPT

## 2022-10-26 PROCEDURE — G0008: CPT

## 2022-10-26 PROCEDURE — 96372 THER/PROPH/DIAG INJ SC/IM: CPT

## 2022-10-26 RX ORDER — MEPOLIZUMAB 100 MG/ML
100 INJECTION, POWDER, FOR SOLUTION SUBCUTANEOUS
Qty: 0 | Refills: 0 | Status: COMPLETED | OUTPATIENT
Start: 2022-10-25

## 2022-11-08 ENCOUNTER — APPOINTMENT (OUTPATIENT)
Dept: PULMONOLOGY | Facility: CLINIC | Age: 72
End: 2022-11-08

## 2022-11-10 NOTE — ED CDU PROVIDER INITIAL DAY NOTE - TEMPLATE, MLM
General Olumiant Counseling: I discussed with the patient the risks of Olumiant therapy including but not limited to upper respiratory tract infections, shingles, cold sores, and nausea. Live vaccines should be avoided.  This medication has been linked to serious infections; higher rate of mortality; malignancy and lymphoproliferative disorders; major adverse cardiovascular events; thrombosis; gastrointestinal perforations; neutropenia; lymphopenia; anemia; liver enzyme elevations; and lipid elevations.

## 2023-01-11 ENCOUNTER — APPOINTMENT (OUTPATIENT)
Dept: ENDOCRINOLOGY | Facility: CLINIC | Age: 73
End: 2023-01-11

## 2023-04-13 ENCOUNTER — APPOINTMENT (OUTPATIENT)
Dept: PULMONOLOGY | Facility: CLINIC | Age: 73
End: 2023-04-13
Payer: MEDICAID

## 2023-04-13 VITALS
DIASTOLIC BLOOD PRESSURE: 60 MMHG | SYSTOLIC BLOOD PRESSURE: 120 MMHG | OXYGEN SATURATION: 98 % | HEART RATE: 88 BPM | TEMPERATURE: 98.2 F | BODY MASS INDEX: 22.98 KG/M2 | HEIGHT: 66 IN | RESPIRATION RATE: 16 BRPM | WEIGHT: 143 LBS

## 2023-04-13 PROCEDURE — 96372 THER/PROPH/DIAG INJ SC/IM: CPT

## 2023-04-13 RX ORDER — MEPOLIZUMAB 100 MG/ML
100 INJECTION, POWDER, FOR SOLUTION SUBCUTANEOUS
Qty: 0 | Refills: 0 | Status: COMPLETED | OUTPATIENT
Start: 2023-04-12

## 2023-04-26 ENCOUNTER — APPOINTMENT (OUTPATIENT)
Dept: PULMONOLOGY | Facility: CLINIC | Age: 73
End: 2023-04-26
Payer: MEDICAID

## 2023-04-26 VITALS
RESPIRATION RATE: 16 BRPM | WEIGHT: 143 LBS | TEMPERATURE: 98 F | OXYGEN SATURATION: 98 % | HEART RATE: 105 BPM | DIASTOLIC BLOOD PRESSURE: 60 MMHG | BODY MASS INDEX: 22.98 KG/M2 | SYSTOLIC BLOOD PRESSURE: 130 MMHG | HEIGHT: 66 IN

## 2023-04-26 DIAGNOSIS — R06.83 SNORING: ICD-10-CM

## 2023-04-26 DIAGNOSIS — J20.9 ACUTE BRONCHITIS, UNSPECIFIED: ICD-10-CM

## 2023-04-26 DIAGNOSIS — J45.901 ACUTE BRONCHITIS, UNSPECIFIED: ICD-10-CM

## 2023-04-26 DIAGNOSIS — U07.1 COVID-19: ICD-10-CM

## 2023-04-26 PROCEDURE — 94729 DIFFUSING CAPACITY: CPT

## 2023-04-26 PROCEDURE — 94727 GAS DIL/WSHOT DETER LNG VOL: CPT

## 2023-04-26 PROCEDURE — 94010 BREATHING CAPACITY TEST: CPT

## 2023-04-26 PROCEDURE — 95012 NITRIC OXIDE EXP GAS DETER: CPT

## 2023-04-26 PROCEDURE — 99214 OFFICE O/P EST MOD 30 MIN: CPT | Mod: 25

## 2023-04-26 NOTE — PHYSICAL EXAM
[No Acute Distress] : no acute distress [No Deformities] : no deformities [III] : Mallampati Class: III [TextBox_54] : 2/6 systolic murmur  [TextBox_68] : I:E ratio 1:3;  mild expiratory wheezes bilaterally

## 2023-04-26 NOTE — PROCEDURE
[FreeTextEntry1] : Full PFT reveals mild restriction and moderate obstructive dysfunction  FEV1 was 1.57 L which is 63% of predicted; low normal lung volumes; normal diffusion at 25.3, which is 157% of predicted; normal flow volume loop.\par PFTs were performed to evaluate for SOB and asthma.\par \par FENO was 210; a normal value being less than 25\par Fractional exhaled nitric oxide (FENO) is regarded as a simple, noninvasive method for assessing eosinophilic airway inflammation. Produced by a variety of cells within the lung, nitric oxide (NO) concentrations are generally low in healthy individuals. However, high concentrations of NO appear to be involved in nonspecific host defense mechanisms and chronic inflammatory diseases such as asthma. The American Thoracic Society (ATS) therefore has recommended using FENO to aid in the diagnosis and monitoring of eosinophilic airway inflammation and asthma, and for identifying steroid responsive individuals whose chronic respiratory symptoms may be caused by airway inflammation.

## 2023-04-26 NOTE — ADDENDUM
[FreeTextEntry1] : Documented by Aramis Antoine acting as a scribe for Dr. Antonio Reyes on 04/26/2023 .\par \par All medical record entries made by the Scribe were at my, Dr. Antonio Reyes's, direction and personally dictated by me on 04/26/2023. I have reviewed the chart and agree that the record accurately reflects my personal performance of the history, physical exam, assessment and plan. I have also personally directed, reviewed, and agree with the discharge instructions.

## 2023-04-26 NOTE — HISTORY OF PRESENT ILLNESS
[FreeTextEntry1] : Mr. Chapman is a 73 year old male with a history of severe persistent corticosteroid dependent Asthma, SOB, allergy presenting to the office today for a follow up visit. His chief complaint is \par \par -he notes generally feeling good \par -he notes wheezing and SOB 4/2022 in [place] due to dust and weather changes\par -he notes constipation \par -he notes energy level is 7/10 \par -he notes compliant on Breo and Spiriva \par -he notes compliant with nebulizer \par -he notes compliant with Nucala \par -he notes mild residual wheezing \par \par -he denies any headaches, nausea, emesis, fever, chills, sweats, chest pain, chest pressure, coughing, palpitations, diarrhea, vertigo, dysphagia, heartburn, reflux, itchy eyes, itchy ears, leg swelling, arthralgias, myalgias, or sour taste in the mouth.

## 2023-04-26 NOTE — ASSESSMENT
[FreeTextEntry1] : Mr. Chapman is a 73 year old male  with a prior history of   diabetes mellitus, asthma for 10 years/ eosinophilic asthma, though most prominent in the last year following PNA and bronchitis, abnormal CT scan of the chest ([place] hiatus 1/23-4/30).  - active asthma 12/2021- ; COVID-19 (+) but symptomatic 4/2023- not 100% yet (off Nucala x 4 months in [place]) \par \par Problem 1: Severe Persistent Asthma (eosinophilic asthma)  (Active due to COVID) \par -continue Nucala- did not receive in [place]  (in place since 10/2020) \par - Continue Duoneb via the nebulizer TID-QID \par -continue Breo Ellipta 200 at 1 inhalation QD\par -continue Spiriva respimat  2.5 qDay \par - Continue Singulair 10 mg QHS \par -Add Aerobika - mucus clearance device \par -add Prednisone 30 mg x 5 days, 20 mg x 5 days, 10 mg x 5 days (9/2022, 4/2023) \par Information sheet given to the patient to be reviewed, this medication is never to be used without consulting the prescribing physician. Proper dietary restraint is necessary specifically salt containing foods, if any reaction may occur should be reported. \par - Asthma is believed to be caused by inherited (genetic) and environmental factor, but its exact cause is unknown. Asthma may be triggered by allergens, lung infections, or irritants in the air. Asthma triggers are different for each person. \par -Inhaler technique reviewed as well as oral hygiene techniques reviewed with patient. Avoidance of cold air, extremes of temperature, rescue inhaler should be used before exercise. Order of medication reviewed with patient. Recommended use of a cool mist humidifier in the bedroom. \par -The safety and efficacy of Nucala was established in three double-blind, randomized, placebo-controlled trials in patients with severe asthma. Compared to a placebo, patients with severe asthma receiving Nucala had fewer exacerbation requiring hospitalization and/or emergency department visits, and a longer time to first exacerbation. In addition, patients with severe asthma receiving Nucala or Fasenra experienced greater reductions in their daily maintenance oral corticosteroid dose, while maintaining asthma control compared with patients receiving placebo. Treatment with Nucala did not result in a significant improvement in lung function, as measured by the volume of air exhaled by patients in one second. The most common side effects include: headache, injection site reactions, back pain, weakness, and fatigue; hypersensitivity reactions can occur within hours or days including swelling of the face, mouth, and tongue, fainting, dizziness, hives, breathing problems, and rash; herpes zoster infections have occurred. The drug is a monoclonal antibody that inhibits interleukin -5 which helps regular eosinophils, a type of white blood cell that contributes to asthma. The over-production of eosinophils can cause inflammation in the lungs, increasing the frequency of asthma attacks. Patients must also take other medications, including high dose inhaled corticosteroids and at least one additional asthma drug \par \par Problem 1A: Acute Bronchitis \par -Biaxin 500 BID (20) \par \par Problem 1A: Corticosteroid- Dependent Asthma\par - s/p on prednisone 2.5 mg QOD (off since 3/2022)\par - Considering Dupixent- initiating paper work (not a candidate) \par - Information sheet given to the patient to be reviewed, this medication is never to be used without consulting the prescribing physician. Proper dietary restraint is necessary specifically salt containing foods, if any reaction may occur should be reported. \par \par Problem 1B: Eosinophilic Asthma (5%, 400)\par -Candidate for Biologics (Fasenra, Nucala, Dupixent) \par -on Nucala (monthly)- restarted 4/2023\par -The safety and efficacy of Nucala was established in three double-blind, randomized, placebo-controlled trials in patients with severe asthma. Compared to a placebo, patients with severe asthma receiving Nucala had fewer exacerbation requiring hospitalization and/or emergency department visits, and a longer time to first exacerbation. In addition, patients with severe asthma receiving Nucala or Fasenra experienced greater reductions in their daily maintenance oral corticosteroid dose, while maintaining asthma control compared with patients receiving placebo. Treatment with Nucala did not result in a significant improvement in lung function, as measured by the volume of air exhaled by patients in one second. The most common side effects include: headache, injection site reactions, back pain, weakness, and fatigue; hypersensitivity reactions can occur within hours or days including swelling of the face, mouth, and tongue, fainting, dizziness, hives, breathing problems, and rash; herpes zoster infections have occurred. The drug is a monoclonal antibody that inhibits interleukin-5 which helps regular eosinophils, a type of white blood cell that contributes to asthma. The over-production of eosinophils can cause inflammation in the lungs, increasing the frequency of asthma attacks. Patients must also take other medications, including high dose inhaled corticosteroids and at least one additional asthma drug.\par \par Problem 2: no tracheomalacia present\par - s/p Dynamic CT scan (-)\par  \par Problem 3: Allergy/ PND\par - Continue Flonase 1 sniff/nostril BID\par Environmental measures for allergies were encouraged including mattress and pillow covers, air purifier and environmental controls.\par \par Problem 4: GERD\par - Continue Omeprazole 40 mg qAM before breakfast\par -continue Pepcid 40 mg QHS \par - Things to avoid including overeating, spicy foods, tight clothing, eating within three hours of bed, this list is not all inclusive. \par - For treatment of reflux, possible options discussed including diet control, H2 blockers, PPIs, as well as coating motility agents discussed as treatment options. Timing of meals and proximity of last meal to sleep were discussed. If symptoms persist, a formal gastrointestinal evaluation is needed. \par \par Problem 5: R/o OSAS\par - Due to his EDS, snoring, elevated Mallampati class, he is being recommended for a home sleep study to r/o the diagnosis of OSAS.\par - Sleep apnea is associated with adverse clinical consequences which an affect most organ systems. Cardiovascular disease risk includes arrhythmias, atrial fibrillation, hypertension, coronary artery disease, and stroke. Metabolic disorders include diabetes type 2, non-alcoholic fatty liver disease. Mood disorder especially depression; and cognitive decline especially in the elderly. Associations with chronic reflux/Batista’s esophagus some but not all inclusive. \par -Reasons include arousal consistent with hypopnea; respiratory events most prominent in REM sleep or supine position; therefore sleep staging and body position are important for accurate diagnosis and estimation of AHI. \par \par Problem 6: Abnormal CT most c/w Inflammation -(resolved)\par - s/p chest CT scan (last 11/2020)- clear; no follow up needed \par -images reviewed and discussed with patient in detail \par \par Problem 7: Health maintenance \par - S/p Covid 19 vaccine (Moderna) x3, s/p COVID-19 8/2022 \par -Covid 19 vaccine discussed at length with patient and the patient was recommended to wait for the booster containing the delta variant \par -COVID-19 booster shot was discussed at length\par -s/p flu shot (2021)\par -recommended strep pneumonia vaccines: Prevnar-13 vaccine, followed by Pneumo vaccine 23 one year following\par -recommended early intervention for URIs\par -recommended regular osteoporosis evaluations\par -recommended early dermatological evaluations\par -recommended after the age of 50 to the age of 70, colonoscopy every 5 years\par \par \par f/u in 3 months with SPI and NiOx\par pt is encouraged to call or fax the office with any questions or concerns. \par Explained to the pt in full detail with demonstrations how to use the inhalers and inhaler hygiene. \par -Education provided to the PT regarding their visit and conditions.

## 2023-05-09 ENCOUNTER — TRANSCRIPTION ENCOUNTER (OUTPATIENT)
Age: 73
End: 2023-05-09

## 2023-05-11 ENCOUNTER — APPOINTMENT (OUTPATIENT)
Dept: PULMONOLOGY | Facility: CLINIC | Age: 73
End: 2023-05-11
Payer: MEDICAID

## 2023-05-11 VITALS
WEIGHT: 143 LBS | RESPIRATION RATE: 16 BRPM | TEMPERATURE: 97.6 F | DIASTOLIC BLOOD PRESSURE: 60 MMHG | HEIGHT: 66 IN | OXYGEN SATURATION: 98 % | SYSTOLIC BLOOD PRESSURE: 128 MMHG | HEART RATE: 72 BPM | BODY MASS INDEX: 22.98 KG/M2

## 2023-05-11 PROCEDURE — 96372 THER/PROPH/DIAG INJ SC/IM: CPT

## 2023-05-11 RX ORDER — MEPOLIZUMAB 100 MG/ML
100 INJECTION, POWDER, FOR SOLUTION SUBCUTANEOUS
Qty: 0 | Refills: 0 | Status: COMPLETED | OUTPATIENT
Start: 2023-05-11

## 2023-05-11 NOTE — PROGRESS NOTE ADULT - PROBLEM SELECTOR PLAN 3
Patient presents to the ED with EMS from home where she lives with her son due to weakness and multiple falls for a couple days. Per report patient has been acting \"innapropriate.\" Disoriented to time. EMS placed patient on 4L NC. HX of COPD and pneumonia.   supportive care

## 2023-05-15 ENCOUNTER — TRANSCRIPTION ENCOUNTER (OUTPATIENT)
Age: 73
End: 2023-05-15

## 2023-05-24 ENCOUNTER — NON-APPOINTMENT (OUTPATIENT)
Age: 73
End: 2023-05-24

## 2023-05-25 ENCOUNTER — NON-APPOINTMENT (OUTPATIENT)
Age: 73
End: 2023-05-25

## 2023-05-25 ENCOUNTER — TRANSCRIPTION ENCOUNTER (OUTPATIENT)
Age: 73
End: 2023-05-25

## 2023-06-08 ENCOUNTER — APPOINTMENT (OUTPATIENT)
Dept: PULMONOLOGY | Facility: CLINIC | Age: 73
End: 2023-06-08
Payer: MEDICAID

## 2023-06-08 VITALS
BODY MASS INDEX: 23.46 KG/M2 | OXYGEN SATURATION: 98 % | WEIGHT: 146 LBS | TEMPERATURE: 97.4 F | DIASTOLIC BLOOD PRESSURE: 60 MMHG | HEIGHT: 66 IN | SYSTOLIC BLOOD PRESSURE: 122 MMHG | HEART RATE: 86 BPM | RESPIRATION RATE: 16 BRPM

## 2023-06-08 PROCEDURE — 96372 THER/PROPH/DIAG INJ SC/IM: CPT

## 2023-06-08 RX ORDER — MEPOLIZUMAB 100 MG/ML
100 INJECTION, POWDER, FOR SOLUTION SUBCUTANEOUS
Qty: 0 | Refills: 0 | Status: COMPLETED | OUTPATIENT
Start: 2023-06-07

## 2023-06-19 ENCOUNTER — TRANSCRIPTION ENCOUNTER (OUTPATIENT)
Age: 73
End: 2023-06-19

## 2023-07-11 ENCOUNTER — APPOINTMENT (OUTPATIENT)
Dept: PULMONOLOGY | Facility: CLINIC | Age: 73
End: 2023-07-11
Payer: MEDICAID

## 2023-07-11 VITALS
DIASTOLIC BLOOD PRESSURE: 64 MMHG | TEMPERATURE: 97.2 F | HEART RATE: 84 BPM | BODY MASS INDEX: 23.14 KG/M2 | WEIGHT: 144 LBS | OXYGEN SATURATION: 96 % | SYSTOLIC BLOOD PRESSURE: 120 MMHG | RESPIRATION RATE: 16 BRPM | HEIGHT: 66 IN

## 2023-07-11 PROCEDURE — 99214 OFFICE O/P EST MOD 30 MIN: CPT

## 2023-07-11 PROCEDURE — 96372 THER/PROPH/DIAG INJ SC/IM: CPT | Mod: 59

## 2023-07-11 RX ORDER — AZITHROMYCIN 250 MG/1
250 TABLET, FILM COATED ORAL
Qty: 1 | Refills: 0 | Status: DISCONTINUED | COMMUNITY
Start: 2019-01-17 | End: 2023-07-11

## 2023-07-11 RX ORDER — MEPOLIZUMAB 100 MG/ML
100 INJECTION, POWDER, FOR SOLUTION SUBCUTANEOUS
Qty: 0 | Refills: 0 | Status: COMPLETED | OUTPATIENT
Start: 2023-07-11

## 2023-07-11 NOTE — ADDENDUM
[FreeTextEntry1] : Documented by Hasmukh Henriquez acting as a scribe for Dr. Antonio Reyes on 07/11/2023 .\par \par All medical record entries made by the Scribe were at my, Dr. Antonio Reyes's, direction and personally dictated by me on 07/11/2023 . I have reviewed the chart and agree that the record accurately reflects my personal performance of the history, physical exam, assessment and plan. I have also personally directed, reviewed, and agree with the discharge instructions.

## 2023-07-11 NOTE — PHYSICAL EXAM
[No Acute Distress] : no acute distress [No Deformities] : no deformities [III] : Mallampati Class: III [TextBox_2] : little OW [TextBox_54] : 2/6 systolic murmur  [TextBox_68] : I:E ratio 1:3;  mild expiratory wheezes bilaterally

## 2023-07-11 NOTE — ASSESSMENT
[FreeTextEntry1] : Mr. Chapman is a 73 year old male  with a prior history of   diabetes mellitus, asthma for 10 years/ eosinophilic asthma, though most prominent in the last year following PNA and bronchitis, abnormal CT scan of the chest ([place] hiatus 1/23-4/4).  - but symptomatic 4/2023- (off Nucala x 4 months in [place]) - now w/ Active despite Nucala since 4/2023\par \par Problem 1: Severe Persistent Asthma (eosinophilic asthma)  (Active due to ?)\par -continue Nucala- did not receive in [place]  (in place since 10/2020) \par - Continue Duoneb via the nebulizer TID-QID \par -continue Breo Ellipta 200 at 1 inhalation QD\par -continue Spiriva respimat  2.5 qDay \par - Continue Singulair 10 mg QHS \par -Add Aerobika - mucus clearance device \par -add Prednisone 30 mg x 5 days, 20 mg x 5 days, 10 mg x 5 days (9/2022, 4/2023), 7/2023\par Information sheet given to the patient to be reviewed, this medication is never to be used without consulting the prescribing physician. Proper dietary restraint is necessary specifically salt containing foods, if any reaction may occur should be reported. \par - Asthma is believed to be caused by inherited (genetic) and environmental factor, but its exact cause is unknown. Asthma may be triggered by allergens, lung infections, or irritants in the air. Asthma triggers are different for each person. \par -Inhaler technique reviewed as well as oral hygiene techniques reviewed with patient. Avoidance of cold air, extremes of temperature, rescue inhaler should be used before exercise. Order of medication reviewed with patient. Recommended use of a cool mist humidifier in the bedroom. \par -The safety and efficacy of Nucala was established in three double-blind, randomized, placebo-controlled trials in patients with severe asthma. Compared to a placebo, patients with severe asthma receiving Nucala had fewer exacerbation requiring hospitalization and/or emergency department visits, and a longer time to first exacerbation. In addition, patients with severe asthma receiving Nucala or Fasenra experienced greater reductions in their daily maintenance oral corticosteroid dose, while maintaining asthma control compared with patients receiving placebo. Treatment with Nucala did not result in a significant improvement in lung function, as measured by the volume of air exhaled by patients in one second. The most common side effects include: headache, injection site reactions, back pain, weakness, and fatigue; hypersensitivity reactions can occur within hours or days including swelling of the face, mouth, and tongue, fainting, dizziness, hives, breathing problems, and rash; herpes zoster infections have occurred. The drug is a monoclonal antibody that inhibits interleukin -5 which helps regular eosinophils, a type of white blood cell that contributes to asthma. The over-production of eosinophils can cause inflammation in the lungs, increasing the frequency of asthma attacks. Patients must also take other medications, including high dose inhaled corticosteroids and at least one additional asthma drug \par \par Problem 1A: Corticosteroid- Dependent Asthma\par - s/p on prednisone 2.5 mg QOD (off since 3/2022)\par - Considering Dupixent- initiating paper work (not a candidate) \par - Information sheet given to the patient to be reviewed, this medication is never to be used without consulting the prescribing physician. Proper dietary restraint is necessary specifically salt containing foods, if any reaction may occur should be reported. \par \par Problem 1B: Eosinophilic Asthma (5%, 400)\par -Candidate for Biologics (Fasenra, Nucala, Dupixent) \par -on Nucala (monthly)- restarted 4/2023\par -The safety and efficacy of Nucala was established in three double-blind, randomized, placebo-controlled trials in patients with severe asthma. Compared to a placebo, patients with severe asthma receiving Nucala had fewer exacerbation requiring hospitalization and/or emergency department visits, and a longer time to first exacerbation. In addition, patients with severe asthma receiving Nucala or Fasenra experienced greater reductions in their daily maintenance oral corticosteroid dose, while maintaining asthma control compared with patients receiving placebo. Treatment with Nucala did not result in a significant improvement in lung function, as measured by the volume of air exhaled by patients in one second. The most common side effects include: headache, injection site reactions, back pain, weakness, and fatigue; hypersensitivity reactions can occur within hours or days including swelling of the face, mouth, and tongue, fainting, dizziness, hives, breathing problems, and rash; herpes zoster infections have occurred. The drug is a monoclonal antibody that inhibits interleukin-5 which helps regular eosinophils, a type of white blood cell that contributes to asthma. The over-production of eosinophils can cause inflammation in the lungs, increasing the frequency of asthma attacks. Patients must also take other medications, including high dose inhaled corticosteroids and at least one additional asthma drug.\par \par Problem 2: no tracheomalacia present\par - s/p Dynamic CT scan (-)\par  \par Problem 3: Allergy/ PND\par - Continue Flonase 1 sniff/nostril BID\par Environmental measures for allergies were encouraged including mattress and pillow covers, air purifier and environmental controls.\par \par Problem 4: GERD\par - Continue Omeprazole 40 mg qAM before breakfast\par -continue Pepcid 40 mg QHS \par - Things to avoid including overeating, spicy foods, tight clothing, eating within three hours of bed, this list is not all inclusive. \par - For treatment of reflux, possible options discussed including diet control, H2 blockers, PPIs, as well as coating motility agents discussed as treatment options. Timing of meals and proximity of last meal to sleep were discussed. If symptoms persist, a formal gastrointestinal evaluation is needed. \par \par Problem 5: R/o OSAS\par - Due to his EDS, snoring, elevated Mallampati class, he is being recommended for a home sleep study to r/o the diagnosis of OSAS.\par - Sleep apnea is associated with adverse clinical consequences which an affect most organ systems. Cardiovascular disease risk includes arrhythmias, atrial fibrillation, hypertension, coronary artery disease, and stroke. Metabolic disorders include diabetes type 2, non-alcoholic fatty liver disease. Mood disorder especially depression; and cognitive decline especially in the elderly. Associations with chronic reflux/Batista’s esophagus some but not all inclusive. \par -Reasons include arousal consistent with hypopnea; respiratory events most prominent in REM sleep or supine position; therefore sleep staging and body position are important for accurate diagnosis and estimation of AHI. \par \par Problem 6: Abnormal CT most c/w Inflammation -(resolved)\par - s/p chest CT scan (last 11/2020)- clear; no follow up needed \par -images reviewed and discussed with patient in detail \par \par Problem 7: Health maintenance \par - S/p Covid 19 vaccine (Moderna) x3, s/p COVID-19 8/2022 \par -Covid 19 vaccine discussed at length with patient and the patient was recommended to wait for the booster containing the delta variant \par -COVID-19 booster shot was discussed at length\par -s/p flu shot (2021)\par -recommended strep pneumonia vaccines: Prevnar-13 vaccine, followed by Pneumo vaccine 23 one year following\par -recommended early intervention for URIs\par -recommended regular osteoporosis evaluations\par -recommended early dermatological evaluations\par -recommended after the age of 50 to the age of 70, colonoscopy every 5 years\par \par \par f/u in 3 months with SPI and NiOx\par pt is encouraged to call or fax the office with any questions or concerns. \par Explained to the pt in full detail with demonstrations how to use the inhalers and inhaler hygiene. \par -Education provided to the PT regarding their visit and conditions.

## 2023-07-11 NOTE — HISTORY OF PRESENT ILLNESS
[FreeTextEntry1] : Mr. Chapman is a 73 year old male with a history of severe persistent corticosteroid dependent Asthma, SOB, allergy presenting to the office today for a follow up visit. His chief complaint is \par \par - he notes for the past 3 or 4 days he has been wheezing \par - he notes that he was in [place] prior to the wheezing \par - he notes taking Metformin \par - he notes being off Prednisone for a month \par - he notes that the wheezing just came about - no illness\par - he notes bowels are regular\par - he denies any swallowing issues \par - he notes he sometimes needs an extra breath when talking\par - he notes he has been sneezing a lot at night starting a couple of days ago \par - he notes compliance with Nucala\par - he notes his blood test showed an A1C of 9.2\par \par -he denies any headaches, nausea, emesis, fever, chills, sweats, chest pain, chest pressure, coughing, palpitations, constipation, diarrhea, vertigo, dysphagia, heartburn, reflux, itchy eyes, itchy ears, leg swelling, leg pain, arthralgias, myalgias, or sour taste in the mouth.

## 2023-08-16 ENCOUNTER — APPOINTMENT (OUTPATIENT)
Dept: PULMONOLOGY | Facility: CLINIC | Age: 73
End: 2023-08-16
Payer: MEDICAID

## 2023-08-16 VITALS
DIASTOLIC BLOOD PRESSURE: 78 MMHG | SYSTOLIC BLOOD PRESSURE: 136 MMHG | HEART RATE: 95 BPM | TEMPERATURE: 97.1 F | RESPIRATION RATE: 16 BRPM | OXYGEN SATURATION: 97 % | HEIGHT: 66 IN | BODY MASS INDEX: 23.46 KG/M2 | WEIGHT: 146 LBS

## 2023-08-16 PROCEDURE — 96372 THER/PROPH/DIAG INJ SC/IM: CPT

## 2023-08-16 RX ORDER — MEPOLIZUMAB 100 MG/ML
100 INJECTION, POWDER, FOR SOLUTION SUBCUTANEOUS
Qty: 0 | Refills: 0 | Status: COMPLETED | OUTPATIENT
Start: 2023-08-15

## 2023-08-30 ENCOUNTER — APPOINTMENT (OUTPATIENT)
Dept: PULMONOLOGY | Facility: CLINIC | Age: 73
End: 2023-08-30
Payer: MEDICAID

## 2023-08-30 VITALS
DIASTOLIC BLOOD PRESSURE: 68 MMHG | BODY MASS INDEX: 23.46 KG/M2 | WEIGHT: 146 LBS | RESPIRATION RATE: 16 BRPM | TEMPERATURE: 96.6 F | OXYGEN SATURATION: 97 % | SYSTOLIC BLOOD PRESSURE: 138 MMHG | HEIGHT: 66 IN | HEART RATE: 87 BPM

## 2023-08-30 PROCEDURE — 95012 NITRIC OXIDE EXP GAS DETER: CPT

## 2023-08-30 PROCEDURE — 99214 OFFICE O/P EST MOD 30 MIN: CPT | Mod: 25

## 2023-08-30 PROCEDURE — 94010 BREATHING CAPACITY TEST: CPT

## 2023-08-30 NOTE — HISTORY OF PRESENT ILLNESS
[FreeTextEntry1] : Mr. Chapman is a 73 year old male with a history of severe persistent corticosteroid dependent Asthma, SOB, allergy presenting to the office today for a follow up visit. His chief complaint is   - he notes feeling generally well - he notes bowels are regular - he notes occasional difficulty swallowing  - he notes some itchiness on the legs - he notes his breathing is good  - he notes the humidity has affected his breathing - he notes doing a lot of work around the house with no limitations - he notes about 6 hours of sleep  - he notes his appetite is good - he notes his biggest complaint is improving his blood sugar - he notes taking Jardiance    -he denies any headaches, nausea, emesis, fever, chills, sweats, chest pain, chest pressure, coughing, wheezing, palpitations, constipation, diarrhea, vertigo, dysphagia, heartburn, reflux, itchy eyes, itchy ears, leg swelling, leg pain, arthralgias, myalgias, or sour taste in the mouth.

## 2023-08-30 NOTE — ADDENDUM
[FreeTextEntry1] : Documented by Hasmukh Henriquez acting as a scribe for Dr. Antonio Reyes on 08/30/2023.   All medical record entries made by the Scribe were at my, Dr. Antonio Reyes's, direction and personally dictated by me on 08/30/2023. I have reviewed the chart and agree that the record accurately reflects my personal performance of the history, physical exam, assessment and plan. I have also personally directed, reviewed, and agree with the discharge instructions.

## 2023-08-30 NOTE — ASSESSMENT
[FreeTextEntry1] : Mr. Chapman is a 73 year old male  with a prior history of   diabetes mellitus, asthma for 10 years/ eosinophilic asthma, though most prominent in the last year following PNA and bronchitis, abnormal CT scan of the chest ([facility] hiatus 1/23-4/4).  - 4/2023- (off Nucala x 4 months in [facility]) - controlled Nucala since 4/2023 - now Stable  Problem 1: Severe Persistent Asthma (eosinophilic asthma)  - controlled -continue Nucala- did not receive in [place]  (in place since 10/2020)  - Continue Duoneb via the nebulizer TID-QID  -continue Breo Ellipta 200 at 1 inhalation QD -continue Spiriva respimat  2.5 qDay  - Continue Singulair 10 mg QHS  -Add Aerobika - mucus clearance device  -s/p Prednisone 30 mg x 5 days, 20 mg x 5 days, 10 mg x 5 days (9/2022, 4/2023), 7/2023 - on 5 mg QD move to qOD Information sheet given to the patient to be reviewed, this medication is never to be used without consulting the prescribing physician. Proper dietary restraint is necessary specifically salt containing foods, if any reaction may occur should be reported.  - Asthma is believed to be caused by inherited (genetic) and environmental factor, but its exact cause is unknown. Asthma may be triggered by allergens, lung infections, or irritants in the air. Asthma triggers are different for each person.  -Inhaler technique reviewed as well as oral hygiene techniques reviewed with patient. Avoidance of cold air, extremes of temperature, rescue inhaler should be used before exercise. Order of medication reviewed with patient. Recommended use of a cool mist humidifier in the bedroom.  -The safety and efficacy of Nucala was established in three double-blind, randomized, placebo-controlled trials in patients with severe asthma. Compared to a placebo, patients with severe asthma receiving Nucala had fewer exacerbation requiring hospitalization and/or emergency department visits, and a longer time to first exacerbation. In addition, patients with severe asthma receiving Nucala or Fasenra experienced greater reductions in their daily maintenance oral corticosteroid dose, while maintaining asthma control compared with patients receiving placebo. Treatment with Nucala did not result in a significant improvement in lung function, as measured by the volume of air exhaled by patients in one second. The most common side effects include: headache, injection site reactions, back pain, weakness, and fatigue; hypersensitivity reactions can occur within hours or days including swelling of the face, mouth, and tongue, fainting, dizziness, hives, breathing problems, and rash; herpes zoster infections have occurred. The drug is a monoclonal antibody that inhibits interleukin -5 which helps regular eosinophils, a type of white blood cell that contributes to asthma. The over-production of eosinophils can cause inflammation in the lungs, increasing the frequency of asthma attacks. Patients must also take other medications, including high dose inhaled corticosteroids and at least one additional asthma drug   Problem 1A: Corticosteroid- Dependent Asthma - s/p on prednisone 2.5 mg QOD (off since 3/2022) - Considering Dupixent- initiating paper work (not a candidate)  - Information sheet given to the patient to be reviewed, this medication is never to be used without consulting the prescribing physician. Proper dietary restraint is necessary specifically salt containing foods, if any reaction may occur should be reported.   Problem 1B: Eosinophilic Asthma (5%, 400) -Candidate for Biologics (Fasenra, Nucala, Dupixent)  -on Nucala (monthly)- restarted 4/2023 -The safety and efficacy of Nucala was established in three double-blind, randomized, placebo-controlled trials in patients with severe asthma. Compared to a placebo, patients with severe asthma receiving Nucala had fewer exacerbation requiring hospitalization and/or emergency department visits, and a longer time to first exacerbation. In addition, patients with severe asthma receiving Nucala or Fasenra experienced greater reductions in their daily maintenance oral corticosteroid dose, while maintaining asthma control compared with patients receiving placebo. Treatment with Nucala did not result in a significant improvement in lung function, as measured by the volume of air exhaled by patients in one second. The most common side effects include: headache, injection site reactions, back pain, weakness, and fatigue; hypersensitivity reactions can occur within hours or days including swelling of the face, mouth, and tongue, fainting, dizziness, hives, breathing problems, and rash; herpes zoster infections have occurred. The drug is a monoclonal antibody that inhibits interleukin-5 which helps regular eosinophils, a type of white blood cell that contributes to asthma. The over-production of eosinophils can cause inflammation in the lungs, increasing the frequency of asthma attacks. Patients must also take other medications, including high dose inhaled corticosteroids and at least one additional asthma drug.  Problem 2: no tracheomalacia present - s/p Dynamic CT scan (-)   Problem 3: Allergy/ PND - Continue Flonase 1 sniff/nostril BID Environmental measures for allergies were encouraged including mattress and pillow covers, air purifier and environmental controls.  Problem 4: GERD - Continue Omeprazole 40 mg qAM before breakfast -continue Pepcid 40 mg QHS  - Things to avoid including overeating, spicy foods, tight clothing, eating within three hours of bed, this list is not all inclusive.  - For treatment of reflux, possible options discussed including diet control, H2 blockers, PPIs, as well as coating motility agents discussed as treatment options. Timing of meals and proximity of last meal to sleep were discussed. If symptoms persist, a formal gastrointestinal evaluation is needed.   Problem 5: R/o OSAS - Due to his EDS, snoring, elevated Mallampati class, he is being recommended for a home sleep study to r/o the diagnosis of OSAS. - Sleep apnea is associated with adverse clinical consequences which an affect most organ systems. Cardiovascular disease risk includes arrhythmias, atrial fibrillation, hypertension, coronary artery disease, and stroke. Metabolic disorders include diabetes type 2, non-alcoholic fatty liver disease. Mood disorder especially depression; and cognitive decline especially in the elderly. Associations with chronic reflux/Batista's esophagus some but not all inclusive.  -Reasons include arousal consistent with hypopnea; respiratory events most prominent in REM sleep or supine position; therefore sleep staging and body position are important for accurate diagnosis and estimation of AHI.   Problem 6: Abnormal CT most c/w Inflammation -(resolved) - s/p chest CT scan (last 11/2020)- clear; no follow up needed  -images reviewed and discussed with patient in detail   Problem 7: Health maintenance  -recommended RSV vaccine in the Fall for anyone over the age of 60 - S/p Covid 19 vaccine (Moderna) x3, s/p COVID-19 8/2022  -Covid 19 vaccine discussed at length with patient and the patient was recommended to wait for the booster containing the delta variant  -COVID-19 booster shot was discussed at length -s/p flu shot (2022) -recommended strep pneumonia vaccines: Prevnar-13 vaccine, followed by Pneumo vaccine 23 one year following -recommended early intervention for URIs -recommended regular osteoporosis evaluations -recommended early dermatological evaluations -recommended after the age of 50 to the age of 70, colonoscopy every 5 years   f/u in 3 months with SPI and NiOx pt is encouraged to call or fax the office with any questions or concerns.  Explained to the pt in full detail with demonstrations how to use the inhalers and inhaler hygiene.  -Education provided to the PT regarding their visit and conditions.

## 2023-08-30 NOTE — PROCEDURE
[FreeTextEntry1] : Full PFT reveals mild to moderate obstruction; FEV1 was 1.71 L which is 68% of predicted; normal lung volumes; normal diffusion at , which is % of predicted; normal flow volume loop. PFTs were performed to evaluate for SOB and Asthma   FENO was 21; a normal value being less than 25 Fractional exhaled nitric oxide (FENO) is regarded as a simple, noninvasive method for assessing eosinophilic airway inflammation. Produced by a variety of cells within the lung, nitric oxide (NO) concentrations are generally low in healthy individuals. However, high concentrations of NO appear to be involved in nonspecific host defense mechanisms and chronic inflammatory diseases such as asthma. The American Thoracic Society (ATS) therefore has recommended using FENO to aid in the diagnosis and monitoring of eosinophilic airway inflammation and asthma, and for identifying steroid responsive individuals whose chronic respiratory symptoms may be caused by airway inflammation.

## 2023-08-30 NOTE — PHYSICAL EXAM
[No Acute Distress] : no acute distress [No Deformities] : no deformities [III] : Mallampati Class: III [Normal Oropharynx] : normal oropharynx [Normal Appearance] : normal appearance [No Neck Mass] : no neck mass [Normal Rate/Rhythm] : normal rate/rhythm [Normal S1, S2] : normal s1, s2 [No Murmurs] : no murmurs [No Resp Distress] : no resp distress [Clear to Auscultation Bilaterally] : clear to auscultation bilaterally [No Abnormalities] : no abnormalities [Benign] : benign [Normal Gait] : normal gait [No Clubbing] : no clubbing [No Cyanosis] : no cyanosis [No Edema] : no edema [FROM] : FROM [Normal Color/ Pigmentation] : normal color/ pigmentation [No Focal Deficits] : no focal deficits [Oriented x3] : oriented x3 [Normal Affect] : normal affect [TextBox_54] : 2/6 systolic murmur  [TextBox_68] : I:E ratio 1:3;  inspiratory squeek on the left side

## 2023-09-12 NOTE — ED CDU PROVIDER INITIAL DAY NOTE - CPE EDP [facility] NORM
You will be receiving a call from [employer] [facility] in 8-12 weeks depending on insurance coverage/approval for set up of your CPAP DEVICE. If it has been over three weeks and you have not received a call, you can contact them at [phone].   
normal...

## 2023-09-13 ENCOUNTER — APPOINTMENT (OUTPATIENT)
Dept: PULMONOLOGY | Facility: CLINIC | Age: 73
End: 2023-09-13
Payer: MEDICAID

## 2023-09-13 VITALS
SYSTOLIC BLOOD PRESSURE: 136 MMHG | HEIGHT: 66 IN | WEIGHT: 146 LBS | BODY MASS INDEX: 23.46 KG/M2 | DIASTOLIC BLOOD PRESSURE: 68 MMHG | RESPIRATION RATE: 16 BRPM | OXYGEN SATURATION: 98 % | TEMPERATURE: 96.7 F | HEART RATE: 82 BPM

## 2023-09-13 PROCEDURE — 96372 THER/PROPH/DIAG INJ SC/IM: CPT

## 2023-09-13 RX ORDER — MEPOLIZUMAB 100 MG/ML
100 INJECTION, POWDER, FOR SOLUTION SUBCUTANEOUS
Qty: 0 | Refills: 0 | Status: COMPLETED | OUTPATIENT
Start: 2023-09-12

## 2023-10-10 ENCOUNTER — TRANSCRIPTION ENCOUNTER (OUTPATIENT)
Age: 73
End: 2023-10-10

## 2023-10-11 ENCOUNTER — APPOINTMENT (OUTPATIENT)
Dept: PULMONOLOGY | Facility: CLINIC | Age: 73
End: 2023-10-11
Payer: MEDICAID

## 2023-10-11 VITALS
HEIGHT: 66 IN | RESPIRATION RATE: 17 BRPM | TEMPERATURE: 97.9 F | DIASTOLIC BLOOD PRESSURE: 80 MMHG | BODY MASS INDEX: 23.46 KG/M2 | HEART RATE: 90 BPM | WEIGHT: 146 LBS | OXYGEN SATURATION: 98 % | SYSTOLIC BLOOD PRESSURE: 128 MMHG

## 2023-10-11 DIAGNOSIS — R05.9 COUGH, UNSPECIFIED: ICD-10-CM

## 2023-10-11 DIAGNOSIS — R09.82 POSTNASAL DRIP: ICD-10-CM

## 2023-10-11 DIAGNOSIS — J45.909 UNSPECIFIED ASTHMA, UNCOMPLICATED: ICD-10-CM

## 2023-10-11 PROCEDURE — ZZZZZ: CPT

## 2023-10-11 PROCEDURE — 99214 OFFICE O/P EST MOD 30 MIN: CPT | Mod: 25

## 2023-10-11 PROCEDURE — 96372 THER/PROPH/DIAG INJ SC/IM: CPT

## 2023-10-11 RX ORDER — TIOTROPIUM BROMIDE INHALATION SPRAY 3.12 UG/1
2.5 SPRAY, METERED RESPIRATORY (INHALATION) DAILY
Qty: 3 | Refills: 1 | Status: DISCONTINUED | COMMUNITY
Start: 2021-08-24 | End: 2023-10-11

## 2023-10-11 RX ORDER — PREDNISONE 10 MG/1
10 TABLET ORAL
Qty: 21 | Refills: 0 | Status: DISCONTINUED | COMMUNITY
Start: 2022-08-18 | End: 2023-10-11

## 2023-10-11 RX ORDER — AZITHROMYCIN 500 MG/1
500 TABLET, FILM COATED ORAL DAILY
Qty: 5 | Refills: 0 | Status: ACTIVE | COMMUNITY
Start: 2023-10-11 | End: 1900-01-01

## 2023-10-11 RX ORDER — PREDNISONE 5 MG/1
5 TABLET ORAL
Qty: 90 | Refills: 0 | Status: DISCONTINUED | COMMUNITY
Start: 2019-04-02 | End: 2023-10-11

## 2023-10-11 RX ORDER — INDACATEROL MALEATE AND GLYCOPYRROLATE 27.5; 15.6 UG/1; UG/1
27.5-15.6 CAPSULE RESPIRATORY (INHALATION) TWICE DAILY
Qty: 3 | Refills: 1 | Status: DISCONTINUED | COMMUNITY
Start: 2019-10-24 | End: 2023-10-11

## 2023-10-11 RX ORDER — MEPOLIZUMAB 100 MG/ML
100 INJECTION, POWDER, FOR SOLUTION SUBCUTANEOUS
Qty: 0 | Refills: 0 | Status: COMPLETED | OUTPATIENT
Start: 2023-10-11

## 2023-10-11 RX ORDER — PREDNISONE 10 MG/1
10 TABLET ORAL
Qty: 50 | Refills: 0 | Status: DISCONTINUED | COMMUNITY
Start: 2023-04-26 | End: 2023-10-11

## 2023-10-11 RX ORDER — PREDNISONE 10 MG/1
10 TABLET ORAL
Qty: 50 | Refills: 0 | Status: DISCONTINUED | COMMUNITY
Start: 2023-10-10 | End: 2023-10-11

## 2023-10-11 RX ORDER — PREDNISONE 10 MG/1
10 TABLET ORAL DAILY
Qty: 60 | Refills: 1 | Status: DISCONTINUED | COMMUNITY
Start: 2019-01-17 | End: 2023-10-11

## 2023-10-11 RX ORDER — FLUTICASONE FUROATE AND VILANTEROL TRIFENATATE 200; 25 UG/1; UG/1
200-25 POWDER RESPIRATORY (INHALATION)
Qty: 1 | Refills: 2 | Status: ACTIVE | COMMUNITY
Start: 2021-03-18 | End: 1900-01-01

## 2023-10-11 RX ORDER — TIOTROPIUM BROMIDE AND OLODATEROL 3.124; 2.736 UG/1; UG/1
2.5-2.5 SPRAY, METERED RESPIRATORY (INHALATION) DAILY
Qty: 3 | Refills: 1 | Status: DISCONTINUED | COMMUNITY
Start: 2021-04-22 | End: 2023-10-11

## 2023-10-11 RX ORDER — PREDNISONE 5 MG/1
5 TABLET ORAL
Qty: 15 | Refills: 1 | Status: DISCONTINUED | COMMUNITY
Start: 2023-08-30 | End: 2023-10-11

## 2023-10-11 RX ORDER — PREDNISONE 5 MG/1
5 TABLET ORAL
Qty: 100 | Refills: 1 | Status: DISCONTINUED | COMMUNITY
Start: 2023-07-11 | End: 2023-10-11

## 2023-10-11 RX ORDER — PREDNISONE 10 MG/1
10 TABLET ORAL
Qty: 31 | Refills: 0 | Status: ACTIVE | COMMUNITY
Start: 2023-10-11 | End: 1900-01-01

## 2023-10-11 RX ORDER — PREDNISONE 10 MG/1
10 TABLET ORAL
Qty: 100 | Refills: 0 | Status: DISCONTINUED | COMMUNITY
Start: 2019-12-30 | End: 2023-10-11

## 2023-10-11 RX ORDER — PREDNISONE 10 MG/1
10 TABLET ORAL
Qty: 50 | Refills: 0 | Status: DISCONTINUED | COMMUNITY
Start: 2022-08-29 | End: 2023-10-11

## 2023-10-11 RX ORDER — FLUTICASONE FUROATE 200 UG/1
200 POWDER RESPIRATORY (INHALATION)
Qty: 3 | Refills: 1 | Status: DISCONTINUED | COMMUNITY
Start: 2019-05-28 | End: 2023-10-11

## 2023-10-11 RX ORDER — TIOTROPIUM BROMIDE INHALATION SPRAY 3.12 UG/1
2.5 SPRAY, METERED RESPIRATORY (INHALATION) DAILY
Qty: 3 | Refills: 1 | Status: DISCONTINUED | COMMUNITY
Start: 2021-11-18 | End: 2023-10-11

## 2023-10-11 RX ORDER — PREDNISONE 10 MG/1
10 TABLET ORAL
Qty: 50 | Refills: 0 | Status: DISCONTINUED | COMMUNITY
Start: 2022-09-19 | End: 2023-10-11

## 2023-10-11 RX ORDER — PREDNISONE 2.5 MG/1
2.5 TABLET ORAL
Qty: 140 | Refills: 0 | Status: DISCONTINUED | COMMUNITY
Start: 2018-12-06 | End: 2023-10-11

## 2023-10-11 RX ORDER — IPRATROPIUM BROMIDE AND ALBUTEROL SULFATE 2.5; .5 MG/3ML; MG/3ML
0.5-2.5 (3) SOLUTION RESPIRATORY (INHALATION)
Qty: 1 | Refills: 1 | Status: ACTIVE | COMMUNITY
Start: 2018-12-06 | End: 1900-01-01

## 2023-10-11 RX ORDER — TIOTROPIUM BROMIDE 18 UG/1
18 CAPSULE ORAL; RESPIRATORY (INHALATION)
Qty: 1 | Refills: 1 | Status: DISCONTINUED | COMMUNITY
Start: 2019-07-09 | End: 2023-10-11

## 2023-10-11 NOTE — ED PROVIDER NOTE - CHIEF COMPLAINT
used via video I Miguel Phillips ID# [id number]. Patient tolerated left breast ultrasound guided biopsy well. Post mammogram images completed by mammography technologist and reviewed by . Biopsy site had small bleeding and no palpable hematoma. Pressure held to stop bleeding and dressing applied. Left breast dressing of steri strips, 2x2 gauze, and mepilex applied, and are clean,dry and intact upon discharge. Patient and her son verbalized understanding of all discharge instructions after full review and given [employer] and English discharge instruction sheet to take home along with a bag of extra supplies if needed. Patient to receive biopsy results by phone call. Patient discharged stable at 11:30 am no complaints.     The patient is a 68y Male complaining of shortness of breath.

## 2023-10-26 NOTE — HISTORY OF PRESENT ILLNESS
[FreeTextEntry1] : Mr. Chapman is a 71 year old male with a history of severe persistent corticosteroid dependent Asthma, SOB, allergy presenting to the office today for a follow up visit. His chief complaint is \par - he notes feeling well in general\par - he notes energy level is good \par - he denies any swallowing issues\par - he notes getting enough sleep, 6 hours sometimes interrupted \par - he notes his bowels are regular \par - he notes sense of smell and taste is okay \par - he notes taking Vitamin D\par - no new medications, vitamins, or supplements \par - he notes coughing occasionally \par - he notes blood sugar is in control \par - s/p covid 19 vaccine x2 \par \par patient denies any headaches, nausea, vomiting, fever, chills, sweats, chest pain, chest pressure, palpitations, wheezing, fatigue, diarrhea, constipation, dysphagia, myalgias, dizziness, leg swelling, leg pain, itchy eyes, itchy ears, heartburn, reflux or sour taste in the mouth 
Never

## 2023-11-08 ENCOUNTER — APPOINTMENT (OUTPATIENT)
Dept: PULMONOLOGY | Facility: CLINIC | Age: 73
End: 2023-11-08
Payer: MEDICAID

## 2023-11-08 VITALS
OXYGEN SATURATION: 98 % | RESPIRATION RATE: 16 BRPM | BODY MASS INDEX: 23.46 KG/M2 | HEART RATE: 90 BPM | DIASTOLIC BLOOD PRESSURE: 70 MMHG | TEMPERATURE: 97.3 F | SYSTOLIC BLOOD PRESSURE: 128 MMHG | WEIGHT: 146 LBS | HEIGHT: 66 IN

## 2023-11-08 PROCEDURE — 96372 THER/PROPH/DIAG INJ SC/IM: CPT

## 2023-11-08 RX ORDER — MEPOLIZUMAB 100 MG/ML
100 INJECTION, POWDER, FOR SOLUTION SUBCUTANEOUS
Qty: 0 | Refills: 0 | Status: COMPLETED | OUTPATIENT
Start: 2023-11-08

## 2023-11-28 ENCOUNTER — TRANSCRIPTION ENCOUNTER (OUTPATIENT)
Age: 73
End: 2023-11-28

## 2023-11-28 RX ORDER — BUDESONIDE 0.5 MG/2ML
0.5 INHALANT ORAL TWICE DAILY
Qty: 2 | Refills: 0 | Status: ACTIVE | COMMUNITY
Start: 2023-11-28 | End: 1900-01-01

## 2023-12-12 ENCOUNTER — APPOINTMENT (OUTPATIENT)
Dept: PULMONOLOGY | Facility: CLINIC | Age: 73
End: 2023-12-12
Payer: MEDICAID

## 2023-12-12 VITALS
OXYGEN SATURATION: 97 % | HEART RATE: 89 BPM | DIASTOLIC BLOOD PRESSURE: 74 MMHG | TEMPERATURE: 97.2 F | RESPIRATION RATE: 16 BRPM | HEIGHT: 66 IN | WEIGHT: 146 LBS | SYSTOLIC BLOOD PRESSURE: 156 MMHG | BODY MASS INDEX: 23.46 KG/M2

## 2023-12-12 PROCEDURE — 94010 BREATHING CAPACITY TEST: CPT

## 2023-12-12 PROCEDURE — 96372 THER/PROPH/DIAG INJ SC/IM: CPT

## 2023-12-12 PROCEDURE — 99214 OFFICE O/P EST MOD 30 MIN: CPT | Mod: 25

## 2023-12-12 RX ORDER — MEPOLIZUMAB 100 MG/ML
100 INJECTION, POWDER, FOR SOLUTION SUBCUTANEOUS
Qty: 0 | Refills: 0 | Status: COMPLETED | OUTPATIENT
Start: 2023-12-11

## 2024-01-08 ENCOUNTER — APPOINTMENT (OUTPATIENT)
Dept: PULMONOLOGY | Facility: CLINIC | Age: 74
End: 2024-01-08
Payer: MEDICAID

## 2024-01-08 VITALS
DIASTOLIC BLOOD PRESSURE: 81 MMHG | BODY MASS INDEX: 23.46 KG/M2 | SYSTOLIC BLOOD PRESSURE: 148 MMHG | OXYGEN SATURATION: 98 % | HEIGHT: 66 IN | TEMPERATURE: 97.2 F | WEIGHT: 146 LBS | HEART RATE: 85 BPM | RESPIRATION RATE: 17 BRPM

## 2024-01-08 PROCEDURE — 96372 THER/PROPH/DIAG INJ SC/IM: CPT

## 2024-01-08 RX ORDER — MEPOLIZUMAB 100 MG/ML
100 INJECTION, POWDER, FOR SOLUTION SUBCUTANEOUS
Qty: 0 | Refills: 0 | Status: COMPLETED | OUTPATIENT
Start: 2024-01-08

## 2024-02-05 ENCOUNTER — APPOINTMENT (OUTPATIENT)
Dept: PULMONOLOGY | Facility: CLINIC | Age: 74
End: 2024-02-05
Payer: MEDICAID

## 2024-02-05 VITALS
DIASTOLIC BLOOD PRESSURE: 70 MMHG | SYSTOLIC BLOOD PRESSURE: 140 MMHG | OXYGEN SATURATION: 95 % | TEMPERATURE: 98.3 F | WEIGHT: 143 LBS | HEART RATE: 96 BPM | HEIGHT: 66 IN | BODY MASS INDEX: 22.98 KG/M2 | RESPIRATION RATE: 17 BRPM

## 2024-02-05 PROCEDURE — 96372 THER/PROPH/DIAG INJ SC/IM: CPT

## 2024-02-05 RX ORDER — OMEPRAZOLE 40 MG/1
40 CAPSULE, DELAYED RELEASE ORAL
Qty: 90 | Refills: 1 | Status: ACTIVE | COMMUNITY
Start: 2021-12-27 | End: 1900-01-01

## 2024-02-05 RX ORDER — MEPOLIZUMAB 100 MG/ML
100 INJECTION, POWDER, FOR SOLUTION SUBCUTANEOUS
Qty: 0 | Refills: 0 | Status: COMPLETED | OUTPATIENT
Start: 2024-02-05

## 2024-02-05 RX ORDER — FAMOTIDINE 40 MG/1
40 TABLET, FILM COATED ORAL
Qty: 90 | Refills: 1 | Status: ACTIVE | COMMUNITY
Start: 2019-10-24 | End: 1900-01-01

## 2024-03-04 ENCOUNTER — APPOINTMENT (OUTPATIENT)
Dept: PULMONOLOGY | Facility: CLINIC | Age: 74
End: 2024-03-04
Payer: MEDICAID

## 2024-03-04 VITALS
RESPIRATION RATE: 16 BRPM | SYSTOLIC BLOOD PRESSURE: 118 MMHG | HEIGHT: 66 IN | BODY MASS INDEX: 22.98 KG/M2 | HEART RATE: 90 BPM | DIASTOLIC BLOOD PRESSURE: 60 MMHG | OXYGEN SATURATION: 98 % | WEIGHT: 143 LBS | TEMPERATURE: 98.2 F

## 2024-03-04 PROCEDURE — 96372 THER/PROPH/DIAG INJ SC/IM: CPT

## 2024-03-04 RX ORDER — MEPOLIZUMAB 100 MG/ML
100 INJECTION, POWDER, FOR SOLUTION SUBCUTANEOUS
Qty: 0 | Refills: 0 | Status: COMPLETED | OUTPATIENT
Start: 2024-03-04

## 2024-04-24 ENCOUNTER — APPOINTMENT (OUTPATIENT)
Dept: PULMONOLOGY | Facility: CLINIC | Age: 74
End: 2024-04-24
Payer: MEDICAID

## 2024-04-24 VITALS
HEIGHT: 66 IN | HEART RATE: 91 BPM | RESPIRATION RATE: 16 BRPM | SYSTOLIC BLOOD PRESSURE: 130 MMHG | DIASTOLIC BLOOD PRESSURE: 70 MMHG | OXYGEN SATURATION: 98 % | TEMPERATURE: 97.9 F | BODY MASS INDEX: 22.98 KG/M2 | WEIGHT: 143 LBS

## 2024-04-24 DIAGNOSIS — R93.89 ABNORMAL FINDINGS ON DIAGNOSTIC IMAGING OF OTHER SPECIFIED BODY STRUCTURES: ICD-10-CM

## 2024-04-24 DIAGNOSIS — J45.909 UNSPECIFIED ASTHMA, UNCOMPLICATED: ICD-10-CM

## 2024-04-24 DIAGNOSIS — J82.83 EOSINOPHILIC ASTHMA: ICD-10-CM

## 2024-04-24 DIAGNOSIS — R06.02 SHORTNESS OF BREATH: ICD-10-CM

## 2024-04-24 DIAGNOSIS — K21.9 GASTRO-ESOPHAGEAL REFLUX DISEASE W/OUT ESOPHAGITIS: ICD-10-CM

## 2024-04-24 DIAGNOSIS — J45.50 SEVERE PERSISTENT ASTHMA, UNCOMPLICATED: ICD-10-CM

## 2024-04-24 PROCEDURE — ZZZZZ: CPT

## 2024-04-24 PROCEDURE — 96372 THER/PROPH/DIAG INJ SC/IM: CPT

## 2024-04-24 PROCEDURE — 94729 DIFFUSING CAPACITY: CPT

## 2024-04-24 PROCEDURE — 94010 BREATHING CAPACITY TEST: CPT

## 2024-04-24 PROCEDURE — 95012 NITRIC OXIDE EXP GAS DETER: CPT

## 2024-04-24 PROCEDURE — 99214 OFFICE O/P EST MOD 30 MIN: CPT | Mod: 25

## 2024-04-24 RX ORDER — MEPOLIZUMAB 100 MG/ML
100 INJECTION, SOLUTION SUBCUTANEOUS
Qty: 0 | Refills: 0 | Status: COMPLETED | OUTPATIENT
Start: 2024-04-24

## 2024-04-24 NOTE — PROCEDURE
[FreeTextEntry1] : Full PFT reveals mild restrictive and obstructive dysfunction; FEV1 was 1.91 L which is 65% of predicted; normal lung volumes; normal diffusion at 19.7, which is 117% of predicted; normal flow volume loop. PFTs were performed to evaluate for SOB  FENO was 94; a normal value being less than 25 Fractional exhaled nitric oxide (FENO) is regarded as a simple, noninvasive method for assessing eosinophilic airway inflammation. Produced by a variety of cells within the lung, nitric oxide (NO) concentrations are generally low in healthy individuals. However, high concentrations of NO appear to be involved in nonspecific host defense mechanisms and chronic inflammatory diseases such as asthma. The American Thoracic Society (ATS) therefore has strongly recommended using FENO to aid in the assessment, management, and long-term monitoring of eosinophilic airway inflammation and asthma, and for identifying steroid responsive individuals whose chronic respiratory symptoms may be caused by airway inflammation. In their 2011 clinical practice guideline, the ATS emphasizes the importance of using FENO.

## 2024-04-24 NOTE — ASSESSMENT
[FreeTextEntry1] : Mr. Chapman is a 74 year old male with a prior history of diabetes mellitus, asthma for 10 years/ eosinophilic asthma, following PNA and bronchitis, abnormal CT scan of the chest ([place] hiatus 1/23-4/4). - 4/2023- (off Nucala x 4 months in [place]) - controlled Nucala since 4/2023 - now Stable on Rx ([place])  Problem 1: Severe Persistent Asthma (eosinophilic asthma) - controlled -continue Nucala- did not receive in [place] (in place since 10/2020) - Continue Duoneb via the nebulizer TID-QID -continue Breo Ellipta 200 at 1 inhalation QD -continue Spiriva respimat 2.5 qDay - Continue Singulair 10 mg QHS -Add Aerobika - mucus clearance device -s/p Prednisone 30 mg x 5 days, 20 mg x 5 days, 10 mg x 5 days (9/2022, 4/2023), 7/2023 - on 5 mg QD move to qOD Information sheet given to the patient to be reviewed, this medication is never to be used without consulting the prescribing physician. Proper dietary restraint is necessary specifically salt containing foods, if any reaction may occur should be reported. - Asthma is believed to be caused by inherited (genetic) and environmental factor, but its exact cause is unknown. Asthma may be triggered by allergens, lung infections, or irritants in the air. Asthma triggers are different for each person. -Inhaler technique reviewed as well as oral hygiene techniques reviewed with patient. Avoidance of cold air, extremes of temperature, rescue inhaler should be used before exercise. Order of medication reviewed with patient. Recommended use of a cool mist humidifier in the bedroom. -The safety and efficacy of Nucala was established in three double-blind, randomized, placebo-controlled trials in patients with severe asthma. Compared to a placebo, patients with severe asthma receiving Nucala had fewer exacerbation requiring hospitalization and/or emergency department visits, and a longer time to first exacerbation. In addition, patients with severe asthma receiving Nucala or Fasenra experienced greater reductions in their daily maintenance oral corticosteroid dose, while maintaining asthma control compared with patients receiving placebo. Treatment with Nucala did not result in a significant improvement in lung function, as measured by the volume of air exhaled by patients in one second. The most common side effects include: headache, injection site reactions, back pain, weakness, and fatigue; hypersensitivity reactions can occur within hours or days including swelling of the face, mouth, and tongue, fainting, dizziness, hives, breathing problems, and rash; herpes zoster infections have occurred. The drug is a monoclonal antibody that inhibits interleukin -5 which helps regular eosinophils, a type of white blood cell that contributes to asthma. The over-production of eosinophils can cause inflammation in the lungs, increasing the frequency of asthma attacks. Patients must also take other medications, including high dose inhaled corticosteroids and at least one additional asthma drug  Problem 1A: Corticosteroid- Dependent Asthma - s/p on prednisone 2.5 mg QOD (off since 3/2022) - Considering Dupixent- initiating paper work (not a candidate) - Information sheet given to the patient to be reviewed, this medication is never to be used without consulting the prescribing physician. Proper dietary restraint is necessary specifically salt containing foods, if any reaction may occur should be reported.  Problem 1B: Eosinophilic Asthma (5%, 400) -Candidate for Biologics (Fasenra, Nucala, Dupixent) -on Nucala (monthly)- restarted 4/2023 -The safety and efficacy of Nucala was established in three double-blind, randomized, placebo-controlled trials in patients with severe asthma. Compared to a placebo, patients with severe asthma receiving Nucala had fewer exacerbation requiring hospitalization and/or emergency department visits, and a longer time to first exacerbation. In addition, patients with severe asthma receiving Nucala or Fasenra experienced greater reductions in their daily maintenance oral corticosteroid dose, while maintaining asthma control compared with patients receiving placebo. Treatment with Nucala did not result in a significant improvement in lung function, as measured by the volume of air exhaled by patients in one second. The most common side effects include: headache, injection site reactions, back pain, weakness, and fatigue; hypersensitivity reactions can occur within hours or days including swelling of the face, mouth, and tongue, fainting, dizziness, hives, breathing problems, and rash; herpes zoster infections have occurred. The drug is a monoclonal antibody that inhibits interleukin-5 which helps regular eosinophils, a type of white blood cell that contributes to asthma. The over-production of eosinophils can cause inflammation in the lungs, increasing the frequency of asthma attacks. Patients must also take other medications, including high dose inhaled corticosteroids and at least one additional asthma drug.  Problem 2: no tracheomalacia present - s/p Dynamic CT scan (-)  Problem 3: Allergy/ PND - Continue Flonase 1 sniff/nostril BID Environmental measures for allergies were encouraged including mattress and pillow covers, air purifier and environmental controls.  Problem 4: GERD - Continue Omeprazole 40 mg qAM before breakfast -continue Pepcid 40 mg QHS - Things to avoid including overeating, spicy foods, tight clothing, eating within three hours of bed, this list is not all inclusive. - For treatment of reflux, possible options discussed including diet control, H2 blockers, PPIs, as well as coating motility agents discussed as treatment options. Timing of meals and proximity of last meal to sleep were discussed. If symptoms persist, a formal gastrointestinal evaluation is needed.  Problem 5: R/o OSAS ([place]) - Due to his EDS, snoring, elevated Mallampati class, he is being recommended for a home sleep study to r/o the diagnosis of OSAS. - Sleep apnea is associated with adverse clinical consequences which an affect most organ systems. Cardiovascular disease risk includes arrhythmias, atrial fibrillation, hypertension, coronary artery disease, and stroke. Metabolic disorders include diabetes type 2, non-alcoholic fatty liver disease. Mood disorder especially depression; and cognitive decline especially in the elderly. Associations with chronic reflux/Batista's esophagus some but not all inclusive. -Reasons include arousal consistent with hypopnea; respiratory events most prominent in REM sleep or supine position; therefore sleep staging and body position are important for accurate diagnosis and estimation of AHI.  Problem 6: Abnormal CT most c/w Inflammation -(resolved) - s/p chest CT scan (last 11/2020)- clear; no follow up needed -images reviewed and discussed with patient in detail  Problem 7: Health maintenance -recommended RSV vaccine in the Fall for anyone over the age of 60 - S/p Covid 19 vaccine (Moderna) x3, s/p COVID-19 8/2022 -Covid 19 vaccine discussed at length with patient and the patient was recommended to wait for the booster containing the delta variant -COVID-19 booster shot was discussed at length -s/p flu shot (2023) -recommended strep pneumonia vaccines: Prevnar-13 vaccine, followed by Pneumo vaccine 23 one year following -recommended early intervention for URIs -recommended regular osteoporosis evaluations -recommended early dermatological evaluations -recommended after the age of 50 to the age of 70, colonoscopy every 5 years   f/u in 3 months with SPI and NiOx pt is encouraged to call or fax the office with any questions or concerns. Explained to the pt in full detail with demonstrations how to use the inhalers and inhaler hygiene. -Education provided to the PT regarding their visit and conditions.

## 2024-04-24 NOTE — ADDENDUM
[FreeTextEntry1] : Documented by Hasmukh Henriquez acting as a scribe for Dr. Antonio Reyes on 04/24/2024.   All medical record entries made by the Scribe were at my, Dr. Antonio Reyes's, direction and personally dictated by me on 04/24/2024. I have reviewed the chart and agree that the record accurately reflects my personal performance of the history, physical exam, assessment and plan. I have also personally directed, reviewed, and agree with the discharge instructions.

## 2024-04-24 NOTE — HISTORY OF PRESENT ILLNESS
[FreeTextEntry1] : Mr. Chapman is a 74 year old male with a history of severe persistent corticosteroid dependent Asthma, SOB, allergy presenting to the office today for a follow up visit. His chief complaint is   - he notes feeling generally well - he notes sleeping well getting 6-7 hours - he notes waking up feeling rested - sense of taste and smell are good - he notes exercising - he notes he started Jardiance 2 months ago - he notes eating well - he notes his balance is good - he notes his allergies are quiet  -he denies any headaches, nausea, emesis, fever, chills, sweats, chest pain, chest pressure, coughing, wheezing, palpitations, constipation, diarrhea, vertigo, dysphagia, heartburn, reflux, itchy eyes, itchy ears, leg swelling, leg pain, arthralgias, myalgias, hoarseness, or sour taste in the mouth.

## 2024-04-24 NOTE — PHYSICAL EXAM
[No Acute Distress] : no acute distress [No Deformities] : no deformities [Normal Oropharynx] : normal oropharynx [II] : Mallampati Class: II [Normal Appearance] : normal appearance [No Neck Mass] : no neck mass [Normal Rate/Rhythm] : normal rate/rhythm [Normal S1, S2] : normal s1, s2 [No Murmurs] : no murmurs [No Resp Distress] : no resp distress [No Abnormalities] : no abnormalities [Benign] : benign [Normal Gait] : normal gait [No Clubbing] : no clubbing [No Cyanosis] : no cyanosis [No Edema] : no edema [FROM] : FROM [Normal Color/ Pigmentation] : normal color/ pigmentation [No Focal Deficits] : no focal deficits [Oriented x3] : oriented x3 [Normal Affect] : normal affect [TextBox_68] : I:E ratio 1:3; clear

## 2024-05-08 RX ORDER — MEPOLIZUMAB 100 MG/ML
100 INJECTION, POWDER, FOR SOLUTION SUBCUTANEOUS
Qty: 1 | Refills: 11 | Status: ACTIVE | COMMUNITY
Start: 2019-10-08 | End: 1900-01-01

## 2024-05-14 NOTE — PROGRESS NOTE ADULT - PROBLEM SELECTOR PLAN 4
- glucose controlled  - fs achs  - start sliding scale insulin  - hold metformin   - start carbohydrate controlled diet Detail Level: Detailed

## 2024-05-24 ENCOUNTER — APPOINTMENT (OUTPATIENT)
Dept: PULMONOLOGY | Facility: CLINIC | Age: 74
End: 2024-05-24
Payer: MEDICAID

## 2024-05-24 VITALS
RESPIRATION RATE: 16 BRPM | HEIGHT: 66 IN | BODY MASS INDEX: 22.98 KG/M2 | SYSTOLIC BLOOD PRESSURE: 120 MMHG | DIASTOLIC BLOOD PRESSURE: 60 MMHG | OXYGEN SATURATION: 98 % | HEART RATE: 93 BPM | WEIGHT: 143 LBS | TEMPERATURE: 98.2 F

## 2024-05-24 PROCEDURE — 96372 THER/PROPH/DIAG INJ SC/IM: CPT

## 2024-05-24 RX ORDER — MEPOLIZUMAB 100 MG/ML
100 INJECTION, POWDER, FOR SOLUTION SUBCUTANEOUS
Qty: 0 | Refills: 0 | Status: COMPLETED | OUTPATIENT
Start: 2024-05-23

## 2024-06-21 ENCOUNTER — APPOINTMENT (OUTPATIENT)
Dept: PULMONOLOGY | Facility: CLINIC | Age: 74
End: 2024-06-21
Payer: MEDICAID

## 2024-06-21 VITALS
BODY MASS INDEX: 22.68 KG/M2 | WEIGHT: 141.09 LBS | RESPIRATION RATE: 18 BRPM | HEART RATE: 83 BPM | DIASTOLIC BLOOD PRESSURE: 68 MMHG | TEMPERATURE: 97.6 F | HEIGHT: 66 IN | OXYGEN SATURATION: 98 % | SYSTOLIC BLOOD PRESSURE: 132 MMHG

## 2024-06-21 PROCEDURE — 96372 THER/PROPH/DIAG INJ SC/IM: CPT

## 2024-06-21 RX ORDER — MEPOLIZUMAB 100 MG/ML
100 INJECTION, POWDER, FOR SOLUTION SUBCUTANEOUS
Qty: 0 | Refills: 0 | Status: COMPLETED | OUTPATIENT
Start: 2024-06-21

## 2024-07-08 ENCOUNTER — APPOINTMENT (OUTPATIENT)
Dept: CT IMAGING | Facility: CLINIC | Age: 74
End: 2024-07-08

## 2024-07-18 ENCOUNTER — APPOINTMENT (OUTPATIENT)
Dept: PULMONOLOGY | Facility: CLINIC | Age: 74
End: 2024-07-18

## 2024-07-18 VITALS
RESPIRATION RATE: 17 BRPM | HEIGHT: 66 IN | HEART RATE: 80 BPM | WEIGHT: 141 LBS | DIASTOLIC BLOOD PRESSURE: 73 MMHG | OXYGEN SATURATION: 98 % | SYSTOLIC BLOOD PRESSURE: 147 MMHG | TEMPERATURE: 97.3 F | BODY MASS INDEX: 22.66 KG/M2

## 2024-07-18 PROCEDURE — 96372 THER/PROPH/DIAG INJ SC/IM: CPT

## 2024-07-18 RX ORDER — MEPOLIZUMAB 100 MG/ML
100 INJECTION, POWDER, FOR SOLUTION SUBCUTANEOUS
Qty: 0 | Refills: 0 | Status: COMPLETED | OUTPATIENT
Start: 2024-07-18

## 2024-08-22 ENCOUNTER — APPOINTMENT (OUTPATIENT)
Dept: PULMONOLOGY | Facility: CLINIC | Age: 74
End: 2024-08-22
Payer: MEDICARE

## 2024-08-22 ENCOUNTER — APPOINTMENT (OUTPATIENT)
Dept: PULMONOLOGY | Facility: CLINIC | Age: 74
End: 2024-08-22

## 2024-08-22 VITALS
WEIGHT: 141 LBS | DIASTOLIC BLOOD PRESSURE: 70 MMHG | SYSTOLIC BLOOD PRESSURE: 122 MMHG | BODY MASS INDEX: 22.66 KG/M2 | RESPIRATION RATE: 17 BRPM | HEART RATE: 92 BPM | HEIGHT: 66 IN | TEMPERATURE: 96.9 F | OXYGEN SATURATION: 98 %

## 2024-08-22 DIAGNOSIS — J45.50 SEVERE PERSISTENT ASTHMA, UNCOMPLICATED: ICD-10-CM

## 2024-08-22 DIAGNOSIS — R93.89 ABNORMAL FINDINGS ON DIAGNOSTIC IMAGING OF OTHER SPECIFIED BODY STRUCTURES: ICD-10-CM

## 2024-08-22 DIAGNOSIS — K21.9 GASTRO-ESOPHAGEAL REFLUX DISEASE W/OUT ESOPHAGITIS: ICD-10-CM

## 2024-08-22 DIAGNOSIS — J82.83 EOSINOPHILIC ASTHMA: ICD-10-CM

## 2024-08-22 DIAGNOSIS — J45.909 UNSPECIFIED ASTHMA, UNCOMPLICATED: ICD-10-CM

## 2024-08-22 DIAGNOSIS — R06.02 SHORTNESS OF BREATH: ICD-10-CM

## 2024-08-22 PROCEDURE — 99204 OFFICE O/P NEW MOD 45 MIN: CPT | Mod: 25

## 2024-08-22 PROCEDURE — 95012 NITRIC OXIDE EXP GAS DETER: CPT

## 2024-08-22 PROCEDURE — ZZZZZ: CPT

## 2024-08-22 PROCEDURE — 96372 THER/PROPH/DIAG INJ SC/IM: CPT

## 2024-08-22 PROCEDURE — 94010 BREATHING CAPACITY TEST: CPT

## 2024-08-22 RX ORDER — MEPOLIZUMAB 100 MG/ML
100 INJECTION, POWDER, FOR SOLUTION SUBCUTANEOUS
Qty: 0 | Refills: 0 | Status: COMPLETED | OUTPATIENT
Start: 2024-08-22

## 2024-08-22 NOTE — PROCEDURE
[FreeTextEntry1] : PFTs revealed mild restrictive dysfunction; FEV1 was 1.70 L, which is 66.1% of predicted; normal flow volume loop. PFTs were performed to evaluate for SOB  FENO was 58; a normal value being less than 25 Fractional exhaled nitric oxide (FENO) is regarded as a simple, noninvasive method for assessing eosinophilic airway inflammation. Produced by a variety of cells within the lung, nitric oxide (NO) concentrations are generally low in healthy individuals. However, high concentrations of NO appear to be involved in nonspecific host defense mechanisms and chronic inflammatory diseases such as asthma. The American Thor[employer] Society (ATS) therefore has recommended using FENO to aid in the diagnosis and monitoring of eosinophilic airway inflammation and asthma, and for identifying steroid responsive individuals whose chronic respiratory symptoms may be caused by airway inflammation.

## 2024-08-22 NOTE — HISTORY OF PRESENT ILLNESS
[FreeTextEntry1] : Mr. Chapman is a 74 year old male with a history of severe persistent corticosteroid dependent Asthma, SOB, allergy presenting to the office today for a follow up visit. His chief complaint is   -he notes feeling generally well -he notes energy levels are good -he notes exercising (gardening) -he denies dysphonia -he notes taking iron supplements -he notes upcoming travel to [place] for 4 months -he notes weight is stable  -he notes appetite is stable   -he denies any headaches, nausea, emesis, fever, chills, sweats, chest pain, chest pressure, coughing, wheezing, palpitations, diarrhea, constipation, dysphagia, vertigo, arthralgias, myalgias, leg swelling, itchy eyes, itchy ears, heartburn, reflux, or sour taste in the mouth.

## 2024-08-22 NOTE — ADDENDUM
[FreeTextEntry1] : Documented by Forest Llanos acting as a scribe for Dr. Antonio Reyes on 08/22/2024 All medical record entries made by the Scribe were at my, Dr. Antonio Reyes's, direction and personally dictated by me on 08/22/2024 . I have reviewed the chart and agree that the record accurately reflects my personal performance of the history, physical exam, assessment and plan. I have also personally directed, reviewed, and agree with the discharge instructions.

## 2024-08-22 NOTE — ASSESSMENT
[FreeTextEntry1] : Mr. Chapman is a 74 year old male with a prior history of diabetes mellitus, asthma for 10 years/ eosinophilic asthma, following PNA and bronchitis, abnormal CT scan of the chest ([place] hiatus 1/23-4/4). - 4/2023- (off Nucala x 4 months in [place]) - controlled Nucala since 4/2023 - now Stable on Rx ([place]); iron deficiency (on supplements)  Problem 1: Severe Persistent Asthma (eosinophilic asthma) - controlled -continue Nucala- did not receive in [place] (in place since 10/2020) - Continue Duoneb via the nebulizer TID-QID -continue Breo Ellipta 200 at 1 inhalation QD -continue Spiriva respimat 2.5 qDay - Continue Singulair 10 mg QHS -Add Aerobika - mucus clearance device -s/p Prednisone 30 mg x 5 days, 20 mg x 5 days, 10 mg x 5 days (9/2022, 4/2023), 7/2023 - on 5 mg QD move to qOD Information sheet given to the patient to be reviewed, this medication is never to be used without consulting the prescribing physician. Proper dietary restraint is necessary specifically salt containing foods, if any reaction may occur should be reported. - Asthma is believed to be caused by inherited (genetic) and environmental factor, but its exact cause is unknown. Asthma may be triggered by allergens, lung infections, or irritants in the air. Asthma triggers are different for each person. -Inhaler technique reviewed as well as oral hygiene techniques reviewed with patient. Avoidance of cold air, extremes of temperature, rescue inhaler should be used before exercise. Order of medication reviewed with patient. Recommended use of a cool mist humidifier in the bedroom. -The safety and efficacy of Nucala was established in three double-blind, randomized, placebo-controlled trials in patients with severe asthma. Compared to a placebo, patients with severe asthma receiving Nucala had fewer exacerbation requiring hospitalization and/or emergency department visits, and a longer time to first exacerbation. In addition, patients with severe asthma receiving Nucala or Fasenra experienced greater reductions in their daily maintenance oral corticosteroid dose, while maintaining asthma control compared with patients receiving placebo. Treatment with Nucala did not result in a significant improvement in lung function, as measured by the volume of air exhaled by patients in one second. The most common side effects include: headache, injection site reactions, back pain, weakness, and fatigue; hypersensitivity reactions can occur within hours or days including swelling of the face, mouth, and tongue, fainting, dizziness, hives, breathing problems, and rash; herpes zoster infections have occurred. The drug is a monoclonal antibody that inhibits interleukin -5 which helps regular eosinophils, a type of white blood cell that contributes to asthma. The over-production of eosinophils can cause inflammation in the lungs, increasing the frequency of asthma attacks. Patients must also take other medications, including high dose inhaled corticosteroids and at least one additional asthma drug  Problem 1A: Corticosteroid- Dependent Asthma - s/p on prednisone 2.5 mg QOD (off since 3/2022) - Considering Dupixent- initiating paper work (not a candidate) - Information sheet given to the patient to be reviewed, this medication is never to be used without consulting the prescribing physician. Proper dietary restraint is necessary specifically salt containing foods, if any reaction may occur should be reported.  Problem 1B: Eosinophilic Asthma (5%, 400) -Candidate for Biologics (Fasenra, Nucala, Dupixent) -on Nucala (monthly)- restarted 4/2023 -The safety and efficacy of Nucala was established in three double-blind, randomized, placebo-controlled trials in patients with severe asthma. Compared to a placebo, patients with severe asthma receiving Nucala had fewer exacerbation requiring hospitalization and/or emergency department visits, and a longer time to first exacerbation. In addition, patients with severe asthma receiving Nucala or Fasenra experienced greater reductions in their daily maintenance oral corticosteroid dose, while maintaining asthma control compared with patients receiving placebo. Treatment with Nucala did not result in a significant improvement in lung function, as measured by the volume of air exhaled by patients in one second. The most common side effects include: headache, injection site reactions, back pain, weakness, and fatigue; hypersensitivity reactions can occur within hours or days including swelling of the face, mouth, and tongue, fainting, dizziness, hives, breathing problems, and rash; herpes zoster infections have occurred. The drug is a monoclonal antibody that inhibits interleukin-5 which helps regular eosinophils, a type of white blood cell that contributes to asthma. The over-production of eosinophils can cause inflammation in the lungs, increasing the frequency of asthma attacks. Patients must also take other medications, including high dose inhaled corticosteroids and at least one additional asthma drug.  Problem 2: no tracheomalacia present - s/p Dynamic CT scan (-)  Problem 3: Allergy/ PND - Continue Flonase 1 sniff/nostril BID Environmental measures for allergies were encouraged including mattress and pillow covers, air purifier and environmental controls.  Problem 4: GERD - Continue Omeprazole 40 mg qAM before breakfast -continue Pepcid 40 mg QHS - Things to avoid including overeating, spicy foods, tight clothing, eating within three hours of bed, this list is not all inclusive. - For treatment of reflux, possible options discussed including diet control, H2 blockers, PPIs, as well as coating motility agents discussed as treatment options. Timing of meals and proximity of last meal to sleep were discussed. If symptoms persist, a formal gastrointestinal evaluation is needed.  Problem 5: R/o OSAS ([place]) - Due to his EDS, snoring, elevated Mallampati class, he is being recommended for a home sleep study to r/o the diagnosis of OSAS. - Sleep apnea is associated with adverse clinical consequences which an affect most organ systems. Cardiovascular disease risk includes arrhythmias, atrial fibrillation, hypertension, coronary artery disease, and stroke. Metabolic disorders include diabetes type 2, non-alcoholic fatty liver disease. Mood disorder especially depression; and cognitive decline especially in the elderly. Associations with chronic reflux/Batista's esophagus some but not all inclusive. -Reasons include arousal consistent with hypopnea; respiratory events most prominent in REM sleep or supine position; therefore sleep staging and body position are important for accurate diagnosis and estimation of AHI.  Problem 6: Abnormal CT most c/w Inflammation -(resolved) - s/p chest CT scan (last 11/2020)- clear; no follow up needed -images reviewed and discussed with patient in detail  Problem 7: Health maintenance -recommend TDAP -recommended RSV vaccine in the Fall for anyone over the age of 60 - S/p Covid 19 vaccine (Moderna) x3, s/p COVID-19 8/2022 -Covid 19 vaccine discussed at length with patient and the patient was recommended to wait for the booster containing the delta variant -COVID-19 booster shot was discussed at length -s/p flu shot (2023) -recommended strep pneumonia vaccines: Prevnar-13 vaccine, followed by Pneumo vaccine 23 one year following -recommended early intervention for URIs -recommended regular osteoporosis evaluations -recommended early dermatological evaluations -recommended after the age of 50 to the age of 70, colonoscopy every 5 years   f/u in 3 months with SPI and NiOx pt is encouraged to call or fax the office with any questions or concerns. Explained to the pt in full detail with demonstrations how to use the inhalers and inhaler hygiene. -Education provided to the PT regarding their visit and conditions.

## 2024-10-16 ENCOUNTER — TRANSCRIPTION ENCOUNTER (OUTPATIENT)
Age: 74
End: 2024-10-16

## 2024-10-16 RX ORDER — PREDNISONE 10 MG/1
10 TABLET ORAL
Qty: 21 | Refills: 0 | Status: ACTIVE | COMMUNITY
Start: 2024-10-16 | End: 1900-01-01

## 2024-10-17 ENCOUNTER — APPOINTMENT (OUTPATIENT)
Dept: PULMONOLOGY | Facility: CLINIC | Age: 74
End: 2024-10-17
Payer: MEDICARE

## 2024-10-17 VITALS
HEART RATE: 89 BPM | HEIGHT: 66 IN | RESPIRATION RATE: 17 BRPM | OXYGEN SATURATION: 97 % | SYSTOLIC BLOOD PRESSURE: 140 MMHG | TEMPERATURE: 97.6 F | DIASTOLIC BLOOD PRESSURE: 80 MMHG

## 2024-10-17 DIAGNOSIS — J82.83 EOSINOPHILIC ASTHMA: ICD-10-CM

## 2024-10-17 DIAGNOSIS — R05.9 COUGH, UNSPECIFIED: ICD-10-CM

## 2024-10-17 DIAGNOSIS — R09.82 POSTNASAL DRIP: ICD-10-CM

## 2024-10-17 DIAGNOSIS — K21.9 GASTRO-ESOPHAGEAL REFLUX DISEASE W/OUT ESOPHAGITIS: ICD-10-CM

## 2024-10-17 DIAGNOSIS — J45.50 SEVERE PERSISTENT ASTHMA, UNCOMPLICATED: ICD-10-CM

## 2024-10-17 PROCEDURE — 99214 OFFICE O/P EST MOD 30 MIN: CPT | Mod: 25

## 2024-10-17 PROCEDURE — 96372 THER/PROPH/DIAG INJ SC/IM: CPT

## 2024-10-17 PROCEDURE — ZZZZZ: CPT

## 2024-10-17 PROCEDURE — 71046 X-RAY EXAM CHEST 2 VIEWS: CPT | Mod: 26

## 2024-10-17 RX ORDER — MEPOLIZUMAB 100 MG/ML
100 INJECTION, POWDER, FOR SOLUTION SUBCUTANEOUS
Qty: 0 | Refills: 0 | Status: COMPLETED | OUTPATIENT
Start: 2024-10-17

## 2024-10-17 RX ORDER — AZELASTINE HYDROCHLORIDE 137 UG/1
0.1 SPRAY, METERED NASAL TWICE DAILY
Qty: 1 | Refills: 3 | Status: ACTIVE | COMMUNITY
Start: 2024-10-17 | End: 1900-01-01

## 2024-11-14 ENCOUNTER — APPOINTMENT (OUTPATIENT)
Dept: PULMONOLOGY | Facility: CLINIC | Age: 74
End: 2024-11-14
Payer: MEDICARE

## 2024-11-14 ENCOUNTER — APPOINTMENT (OUTPATIENT)
Dept: OPHTHALMOLOGY | Facility: CLINIC | Age: 74
End: 2024-11-14
Payer: MEDICARE

## 2024-11-14 ENCOUNTER — NON-APPOINTMENT (OUTPATIENT)
Age: 74
End: 2024-11-14

## 2024-11-14 VITALS
WEIGHT: 141 LBS | SYSTOLIC BLOOD PRESSURE: 138 MMHG | BODY MASS INDEX: 22.66 KG/M2 | OXYGEN SATURATION: 98 % | RESPIRATION RATE: 18 BRPM | HEIGHT: 66 IN | HEART RATE: 93 BPM | TEMPERATURE: 97.8 F | DIASTOLIC BLOOD PRESSURE: 58 MMHG

## 2024-11-14 PROCEDURE — 96401 CHEMO ANTI-NEOPL SQ/IM: CPT

## 2024-11-14 PROCEDURE — G0008: CPT

## 2024-11-14 PROCEDURE — 90662 IIV NO PRSV INCREASED AG IM: CPT

## 2024-11-14 PROCEDURE — 92004 COMPRE OPH EXAM NEW PT 1/>: CPT

## 2024-11-14 RX ORDER — MEPOLIZUMAB 100 MG/ML
100 INJECTION, SOLUTION SUBCUTANEOUS
Qty: 0 | Refills: 0 | Status: COMPLETED | OUTPATIENT
Start: 2024-11-14

## 2024-11-14 RX ADMIN — MEPOLIZUMAB 0 MG/ML: 100 INJECTION, SOLUTION SUBCUTANEOUS at 00:00

## 2025-01-29 RX ORDER — MEPOLIZUMAB 100 MG/ML
100 INJECTION, POWDER, FOR SOLUTION SUBCUTANEOUS
Qty: 1 | Refills: 5 | Status: ACTIVE | COMMUNITY
Start: 2025-01-29 | End: 1900-01-01

## 2025-02-14 ENCOUNTER — APPOINTMENT (OUTPATIENT)
Dept: PULMONOLOGY | Facility: CLINIC | Age: 75
End: 2025-02-14
Payer: MEDICARE

## 2025-02-14 VITALS
SYSTOLIC BLOOD PRESSURE: 124 MMHG | WEIGHT: 141 LBS | DIASTOLIC BLOOD PRESSURE: 70 MMHG | HEART RATE: 78 BPM | OXYGEN SATURATION: 98 % | HEIGHT: 66 IN | RESPIRATION RATE: 16 BRPM | BODY MASS INDEX: 22.66 KG/M2 | TEMPERATURE: 98.4 F

## 2025-02-14 PROCEDURE — 96401 CHEMO ANTI-NEOPL SQ/IM: CPT

## 2025-02-14 RX ORDER — MEPOLIZUMAB 100 MG/ML
100 INJECTION, POWDER, FOR SOLUTION SUBCUTANEOUS
Qty: 0 | Refills: 0 | Status: COMPLETED | OUTPATIENT
Start: 2025-02-13

## 2025-03-10 ENCOUNTER — TRANSCRIPTION ENCOUNTER (OUTPATIENT)
Age: 75
End: 2025-03-10

## 2025-03-18 RX ORDER — MEPOLIZUMAB 100 MG/ML
100 INJECTION, SOLUTION SUBCUTANEOUS
Qty: 1 | Refills: 5 | Status: ACTIVE | COMMUNITY
Start: 2025-03-12 | End: 1900-01-01

## 2025-03-27 ENCOUNTER — APPOINTMENT (OUTPATIENT)
Dept: PULMONOLOGY | Facility: CLINIC | Age: 75
End: 2025-03-27
Payer: MEDICARE

## 2025-03-27 VITALS
BODY MASS INDEX: 22.32 KG/M2 | RESPIRATION RATE: 16 BRPM | WEIGHT: 138.89 LBS | DIASTOLIC BLOOD PRESSURE: 80 MMHG | OXYGEN SATURATION: 97 % | SYSTOLIC BLOOD PRESSURE: 140 MMHG | TEMPERATURE: 97.3 F | HEART RATE: 82 BPM | HEIGHT: 66 IN

## 2025-03-27 DIAGNOSIS — R06.02 SHORTNESS OF BREATH: ICD-10-CM

## 2025-03-27 DIAGNOSIS — J82.83 EOSINOPHILIC ASTHMA: ICD-10-CM

## 2025-03-27 DIAGNOSIS — R93.89 ABNORMAL FINDINGS ON DIAGNOSTIC IMAGING OF OTHER SPECIFIED BODY STRUCTURES: ICD-10-CM

## 2025-03-27 DIAGNOSIS — J45.50 SEVERE PERSISTENT ASTHMA, UNCOMPLICATED: ICD-10-CM

## 2025-03-27 DIAGNOSIS — R09.82 POSTNASAL DRIP: ICD-10-CM

## 2025-03-27 DIAGNOSIS — K21.9 GASTRO-ESOPHAGEAL REFLUX DISEASE W/OUT ESOPHAGITIS: ICD-10-CM

## 2025-03-27 PROCEDURE — 94729 DIFFUSING CAPACITY: CPT

## 2025-03-27 PROCEDURE — 94010 BREATHING CAPACITY TEST: CPT

## 2025-03-27 PROCEDURE — 94727 GAS DIL/WSHOT DETER LNG VOL: CPT

## 2025-03-27 PROCEDURE — ZZZZZ: CPT

## 2025-03-27 PROCEDURE — 99214 OFFICE O/P EST MOD 30 MIN: CPT | Mod: 25

## 2025-03-27 PROCEDURE — 96401 CHEMO ANTI-NEOPL SQ/IM: CPT

## 2025-03-27 RX ORDER — MEPOLIZUMAB 100 MG/ML
100 INJECTION, POWDER, FOR SOLUTION SUBCUTANEOUS
Qty: 0 | Refills: 0 | Status: COMPLETED | OUTPATIENT
Start: 2025-03-27

## 2025-03-31 RX ORDER — PREDNISONE 10 MG/1
10 TABLET ORAL
Qty: 100 | Refills: 1 | Status: ACTIVE | COMMUNITY
Start: 2025-03-27 | End: 1900-01-01

## 2025-04-30 ENCOUNTER — NON-APPOINTMENT (OUTPATIENT)
Age: 75
End: 2025-04-30

## 2025-05-01 ENCOUNTER — APPOINTMENT (OUTPATIENT)
Dept: PULMONOLOGY | Facility: CLINIC | Age: 75
End: 2025-05-01
Payer: MEDICARE

## 2025-05-01 VITALS
OXYGEN SATURATION: 97 % | DIASTOLIC BLOOD PRESSURE: 66 MMHG | TEMPERATURE: 97.7 F | BODY MASS INDEX: 23.14 KG/M2 | HEART RATE: 89 BPM | WEIGHT: 144 LBS | SYSTOLIC BLOOD PRESSURE: 138 MMHG | HEIGHT: 66 IN | RESPIRATION RATE: 18 BRPM

## 2025-05-01 PROCEDURE — 96401 CHEMO ANTI-NEOPL SQ/IM: CPT

## 2025-05-01 RX ORDER — MEPOLIZUMAB 100 MG/ML
100 INJECTION, SOLUTION SUBCUTANEOUS
Qty: 0 | Refills: 0 | Status: COMPLETED | OUTPATIENT
Start: 2025-05-01

## 2025-05-02 ENCOUNTER — NON-APPOINTMENT (OUTPATIENT)
Age: 75
End: 2025-05-02

## 2025-05-02 ENCOUNTER — APPOINTMENT (OUTPATIENT)
Dept: UROLOGY | Facility: CLINIC | Age: 75
End: 2025-05-02
Payer: MEDICARE

## 2025-05-02 DIAGNOSIS — Z78.9 OTHER SPECIFIED HEALTH STATUS: ICD-10-CM

## 2025-05-02 DIAGNOSIS — N39.0 URINARY TRACT INFECTION, SITE NOT SPECIFIED: ICD-10-CM

## 2025-05-02 DIAGNOSIS — R97.20 ELEVATED PROSTATE, SPECIFIC ANTIGEN [PSA]: ICD-10-CM

## 2025-05-02 PROCEDURE — 51736 URINE FLOW MEASUREMENT: CPT

## 2025-05-02 PROCEDURE — 99204 OFFICE O/P NEW MOD 45 MIN: CPT

## 2025-05-04 LAB
APPEARANCE: CLEAR
BACTERIA UR CULT: NORMAL
BACTERIA: NEGATIVE /HPF
BILIRUBIN URINE: NEGATIVE
BLOOD URINE: NEGATIVE
CAST: 0 /LPF
COLOR: YELLOW
EPITHELIAL CELLS: 0 /HPF
GLUCOSE QUALITATIVE U: >=1000 MG/DL
KETONES URINE: NEGATIVE MG/DL
LEUKOCYTE ESTERASE URINE: NEGATIVE
MICROSCOPIC-UA: NORMAL
NITRITE URINE: NEGATIVE
PH URINE: 5.5
PROTEIN URINE: NEGATIVE MG/DL
PSA SERPL-MCNC: 3.7 NG/ML
RED BLOOD CELLS URINE: 0 /HPF
SPECIFIC GRAVITY URINE: 1.02
UROBILINOGEN URINE: 0.2 MG/DL
WHITE BLOOD CELLS URINE: 1 /HPF

## 2025-05-30 ENCOUNTER — APPOINTMENT (OUTPATIENT)
Dept: PULMONOLOGY | Facility: CLINIC | Age: 75
End: 2025-05-30
Payer: MEDICARE

## 2025-05-30 VITALS
TEMPERATURE: 97.9 F | HEART RATE: 88 BPM | SYSTOLIC BLOOD PRESSURE: 124 MMHG | BODY MASS INDEX: 23.63 KG/M2 | WEIGHT: 147 LBS | HEIGHT: 66 IN | RESPIRATION RATE: 18 BRPM | DIASTOLIC BLOOD PRESSURE: 68 MMHG | OXYGEN SATURATION: 98 %

## 2025-05-30 PROCEDURE — 96401 CHEMO ANTI-NEOPL SQ/IM: CPT

## 2025-05-30 RX ORDER — MEPOLIZUMAB 100 MG/ML
100 INJECTION, POWDER, FOR SOLUTION SUBCUTANEOUS
Qty: 0 | Refills: 0 | Status: COMPLETED | OUTPATIENT
Start: 2025-05-30

## 2025-06-26 ENCOUNTER — APPOINTMENT (OUTPATIENT)
Dept: UROLOGY | Facility: CLINIC | Age: 75
End: 2025-06-26
Payer: MEDICARE

## 2025-06-26 PROCEDURE — G2211 COMPLEX E/M VISIT ADD ON: CPT

## 2025-06-26 PROCEDURE — 99213 OFFICE O/P EST LOW 20 MIN: CPT

## 2025-06-27 ENCOUNTER — APPOINTMENT (OUTPATIENT)
Dept: PULMONOLOGY | Facility: CLINIC | Age: 75
End: 2025-06-27
Payer: MEDICARE

## 2025-06-27 VITALS
SYSTOLIC BLOOD PRESSURE: 131 MMHG | TEMPERATURE: 97.5 F | OXYGEN SATURATION: 98 % | BODY MASS INDEX: 23.63 KG/M2 | HEART RATE: 81 BPM | WEIGHT: 147 LBS | DIASTOLIC BLOOD PRESSURE: 74 MMHG | RESPIRATION RATE: 17 BRPM | HEIGHT: 66 IN

## 2025-06-27 LAB — PSA SERPL-MCNC: 3.24 NG/ML

## 2025-06-27 PROCEDURE — 96401 CHEMO ANTI-NEOPL SQ/IM: CPT

## 2025-06-27 RX ORDER — MEPOLIZUMAB 100 MG/ML
100 INJECTION, SOLUTION SUBCUTANEOUS
Qty: 0 | Refills: 0 | Status: COMPLETED | OUTPATIENT
Start: 2025-06-27

## 2025-07-09 ENCOUNTER — APPOINTMENT (OUTPATIENT)
Dept: PULMONOLOGY | Facility: CLINIC | Age: 75
End: 2025-07-09
Payer: MEDICARE

## 2025-07-09 VITALS
HEIGHT: 66 IN | RESPIRATION RATE: 17 BRPM | OXYGEN SATURATION: 98 % | BODY MASS INDEX: 23.95 KG/M2 | DIASTOLIC BLOOD PRESSURE: 68 MMHG | HEART RATE: 88 BPM | SYSTOLIC BLOOD PRESSURE: 120 MMHG | WEIGHT: 149 LBS | TEMPERATURE: 97.3 F

## 2025-07-09 PROCEDURE — 94010 BREATHING CAPACITY TEST: CPT

## 2025-07-09 PROCEDURE — 95012 NITRIC OXIDE EXP GAS DETER: CPT

## 2025-07-09 PROCEDURE — 99214 OFFICE O/P EST MOD 30 MIN: CPT | Mod: 25
